# Patient Record
Sex: FEMALE | Race: WHITE
[De-identification: names, ages, dates, MRNs, and addresses within clinical notes are randomized per-mention and may not be internally consistent; named-entity substitution may affect disease eponyms.]

---

## 2020-01-13 ENCOUNTER — HOSPITAL ENCOUNTER (OUTPATIENT)
Dept: HOSPITAL 53 - M RAD | Age: 75
End: 2020-01-13
Attending: PHYSICIAN ASSISTANT
Payer: MEDICARE

## 2020-01-13 DIAGNOSIS — R05: Primary | ICD-10-CM

## 2020-01-13 NOTE — REP
CHEST X-RAY:  TWO VIEWS.

 

HISTORY:  Cough and shortness of breath.  Rule out pneumonia.  No comparison

study.

 

FINDINGS:  There is a large hiatal hernia behind the heart.  Heart is not felt to

be enlarged.  There are clips associated with the hiatal hernia.  There appear to

be some increased markings lateral to the hiatal hernia in the left base

suggestive of an infiltrate.  This is subtle.  The remaining lung fields are

clear.  There is no evidence of pleural effusion.  The aorta is tortuous.

Pulmonary vasculature is not felt to be increased.

 

IMPRESSION:

 

Subtle infiltrate suspected left base.  There is a large hiatal hernia behind the

heart with some associated surgical clips.

 

 

Electronically Signed by

Jeremy Christian MD 01/13/2020 02:51 P

## 2020-01-22 ENCOUNTER — HOSPITAL ENCOUNTER (OUTPATIENT)
Dept: HOSPITAL 53 - M ED | Age: 75
Setting detail: OBSERVATION
LOS: 1 days | Discharge: HOME | End: 2020-01-23
Attending: INTERNAL MEDICINE | Admitting: INTERNAL MEDICINE
Payer: MEDICARE

## 2020-01-22 VITALS — WEIGHT: 212.31 LBS | HEIGHT: 64 IN | BODY MASS INDEX: 36.25 KG/M2

## 2020-01-22 VITALS — SYSTOLIC BLOOD PRESSURE: 144 MMHG | DIASTOLIC BLOOD PRESSURE: 94 MMHG

## 2020-01-22 VITALS — SYSTOLIC BLOOD PRESSURE: 126 MMHG | DIASTOLIC BLOOD PRESSURE: 72 MMHG

## 2020-01-22 DIAGNOSIS — Z88.6: ICD-10-CM

## 2020-01-22 DIAGNOSIS — R09.02: ICD-10-CM

## 2020-01-22 DIAGNOSIS — K44.9: ICD-10-CM

## 2020-01-22 DIAGNOSIS — R07.9: Primary | ICD-10-CM

## 2020-01-22 DIAGNOSIS — R19.7: ICD-10-CM

## 2020-01-22 DIAGNOSIS — R60.0: ICD-10-CM

## 2020-01-22 LAB
ALBUMIN SERPL BCG-MCNC: 3.2 GM/DL (ref 3.2–5.2)
ALT SERPL W P-5'-P-CCNC: < 6 U/L (ref 12–78)
BASOPHILS # BLD AUTO: 0 10^3/UL (ref 0–0.2)
BASOPHILS NFR BLD AUTO: 0.3 % (ref 0–1)
BILIRUB CONJ SERPL-MCNC: 0.3 MG/DL (ref 0–0.2)
BILIRUB SERPL-MCNC: 0.9 MG/DL (ref 0.2–1)
BUN SERPL-MCNC: 17 MG/DL (ref 7–18)
CALCIUM SERPL-MCNC: 8.5 MG/DL (ref 8.8–10.2)
CHLORIDE SERPL-SCNC: 103 MEQ/L (ref 98–107)
CK MB CFR.DF SERPL CALC: 1.82
CK MB SERPL-MCNC: < 1 NG/ML (ref ?–3.6)
CK SERPL-CCNC: 55 U/L (ref 26–192)
CO2 SERPL-SCNC: 27 MEQ/L (ref 21–32)
CREAT SERPL-MCNC: 1.07 MG/DL (ref 0.55–1.3)
EOSINOPHIL # BLD AUTO: 0.1 10^3/UL (ref 0–0.5)
EOSINOPHIL NFR BLD AUTO: 0.8 % (ref 0–3)
GFR SERPL CREATININE-BSD FRML MDRD: 53.4 ML/MIN/{1.73_M2} (ref 39–?)
GLUCOSE SERPL-MCNC: 78 MG/DL (ref 70–100)
HCT VFR BLD AUTO: 42.7 % (ref 36–47)
HGB BLD-MCNC: 13.3 G/DL (ref 12–15.5)
INR PPP: 1.18
LIPASE SERPL-CCNC: 173 U/L (ref 73–393)
LYMPHOCYTES # BLD AUTO: 1.8 10^3/UL (ref 1.5–5)
LYMPHOCYTES NFR BLD AUTO: 15.4 % (ref 24–44)
MCH RBC QN AUTO: 30.8 PG (ref 27–33)
MCHC RBC AUTO-ENTMCNC: 31.1 G/DL (ref 32–36.5)
MCV RBC AUTO: 98.8 FL (ref 80–96)
MONOCYTES # BLD AUTO: 2 10^3/UL (ref 0–0.8)
MONOCYTES NFR BLD AUTO: 16.8 % (ref 0–5)
NEUTROPHILS # BLD AUTO: 7.7 10^3/UL (ref 1.5–8.5)
NEUTROPHILS NFR BLD AUTO: 65.6 % (ref 36–66)
NT-PRO BNP: 353 PG/ML (ref ?–125)
PLATELET # BLD AUTO: 407 10^3/UL (ref 150–450)
POTASSIUM SERPL-SCNC: 4.6 MEQ/L (ref 3.5–5.1)
PROT SERPL-MCNC: 7.2 GM/DL (ref 6.4–8.2)
PROTHROMBIN TIME: 14.7 SECONDS (ref 11.8–14)
RBC # BLD AUTO: 4.32 10^6/UL (ref 4–5.4)
SODIUM SERPL-SCNC: 137 MEQ/L (ref 136–145)
TROPONIN I SERPL-MCNC: < 0.02 NG/ML (ref ?–0.1)
TSH SERPL DL<=0.005 MIU/L-ACNC: 2.12 UIU/ML (ref 0.36–3.74)
WBC # BLD AUTO: 11.8 10^3/UL (ref 4–10)

## 2020-01-22 PROCEDURE — 80048 BASIC METABOLIC PNL TOTAL CA: CPT

## 2020-01-22 PROCEDURE — 85025 COMPLETE CBC W/AUTO DIFF WBC: CPT

## 2020-01-22 PROCEDURE — 96375 TX/PRO/DX INJ NEW DRUG ADDON: CPT

## 2020-01-22 PROCEDURE — 96374 THER/PROPH/DIAG INJ IV PUSH: CPT

## 2020-01-22 PROCEDURE — 36415 COLL VENOUS BLD VENIPUNCTURE: CPT

## 2020-01-22 PROCEDURE — 80076 HEPATIC FUNCTION PANEL: CPT

## 2020-01-22 PROCEDURE — 71260 CT THORAX DX C+: CPT

## 2020-01-22 PROCEDURE — 85610 PROTHROMBIN TIME: CPT

## 2020-01-22 PROCEDURE — 84484 ASSAY OF TROPONIN QUANT: CPT

## 2020-01-22 PROCEDURE — 84443 ASSAY THYROID STIM HORMONE: CPT

## 2020-01-22 PROCEDURE — 85027 COMPLETE CBC AUTOMATED: CPT

## 2020-01-22 PROCEDURE — 94760 N-INVAS EAR/PLS OXIMETRY 1: CPT

## 2020-01-22 PROCEDURE — 93041 RHYTHM ECG TRACING: CPT

## 2020-01-22 PROCEDURE — 99285 EMERGENCY DEPT VISIT HI MDM: CPT

## 2020-01-22 PROCEDURE — 82550 ASSAY OF CK (CPK): CPT

## 2020-01-22 PROCEDURE — 82553 CREATINE MB FRACTION: CPT

## 2020-01-22 PROCEDURE — 93005 ELECTROCARDIOGRAM TRACING: CPT

## 2020-01-22 PROCEDURE — 83690 ASSAY OF LIPASE: CPT

## 2020-01-22 PROCEDURE — 71045 X-RAY EXAM CHEST 1 VIEW: CPT

## 2020-01-22 PROCEDURE — 83880 ASSAY OF NATRIURETIC PEPTIDE: CPT

## 2020-01-22 PROCEDURE — 96372 THER/PROPH/DIAG INJ SC/IM: CPT

## 2020-01-22 RX ADMIN — SODIUM CHLORIDE SCH UNITS: 4.5 INJECTION, SOLUTION INTRAVENOUS at 21:26

## 2020-01-22 RX ADMIN — SODIUM CHLORIDE, PRESERVATIVE FREE SCH ML: 5 INJECTION INTRAVENOUS at 22:09

## 2020-01-22 NOTE — REP
Portable chest, 11:27 a.m., single AP view with the the patient sitting:

Comparison is 01/13/2020.

There is a large hiatal hernia, as previously.

There is focal increased density inferiorly in the left lung, similar to the

prior study, infiltrate versus compression atelectasis from the hiatal hernia.

Right lung is clear.

Cardiac size is normal.

The violeta, mediastinum, skeletal structures are unremarkable.

Impression:

No significant interval change.

Large hiatal hernia.

Increased density inferiorly in the left lung:  Infiltrate versus compression

atelectasis from the hiatal hernia.

 

 

Electronically Signed by

Hair Bunch MD 01/22/2020 11:43 A

## 2020-01-22 NOTE — HPEPDOC
General


Date of Admission


Jan 22, 2020 at 14:12


Date of Service:  Jan 22, 2020


Chief Complaint


The patient is a 75-year-old female admitted with a reason for visit of Chest 

Pain.


Source:  Patient, Family, EMS notes reviewed


Exam Limitations:  No limitations


Timing/Duration:  This morning


Severity:  Moderate


Associated Symptoms:  Chest Pain, Shortness of breath





History of Present Illness


Ms. Babcock is a 75 year old female who presented to the ED with CP and dyspnea. 

Pt reported that this morning she had gotten ready to go out at 10am; while 

sitting and waiting for her ride, she noted 10/10, gripping pain in the middle 

of her chest that radiated to the R side.  The pain lasted for 'several 

minutes', she noticed some shortness of breath and felt very nervous. Pt noted 

some nausea with the pain, but no vomiting.  It waned spontaneously, returned 

less intense several minutes later.  Pt denied previous episodes.  The entire 

episode lasted 30 mins to 1 hour.  Pt denied any pain currently.  


Pt has not seen a doctor regularly in years.  She recently completed Tx for 

pneumonia outpt.  She was sent to the hospital for CXR and Rx Prednisone and 

Azithromycin.  Dyspnea has improved since last week but she and her daughter 

stated that this morning she had gotten very short of breath. 


Pt also reported diarrhea that has been ongoing for 3-4 months.  The stool is 

brown and watery in consistency, sometimes 4-5 times per day, others not at all.





Home Medications


Scheduled PRN


Ibuprofen (Ibu-200) 200 Mg Tablet, 400 MG PO DAILY PRN for BACK PAIN, (Reported)





Allergies


Coded Allergies:  


     aspirin (Verified  Adverse Reaction, Unknown, ulcers, 1/22/20)





Past Medical History


Medical History


Hiatal hernia 


Remote history of Fx L leg (set and cast)


Surgical History


Hiatal hernia repair





Family History


Significant Family History:  Diabetes (Mother ), Lung disease (Father - 

Emphysema )





Social History


* Smoker:  Denies


Alcohol:  Denies


Drugs:  denies


Recent Travel/Sick Contacts:  Denies: Recent travel





A-FIB/CHADSVASC


A-FIB History


Current/History of A-Fib/PAF?:  No


Current PO Anticoag Therapy:  No





Review of Systems


Constitutional:  Denies: Chills, Fever, Night Sweats


Eyes:  Denies: Pain


ENT:  Denies: Head Aches


Skin:  Denies: Rash, Lesions, Breakdown


Pulmonary:  Reports: Dyspnea; 


   Denies: Cough


Cardiovascular:  Reports: Chest Pain; 


   Denies: Palpitations, Orthopnea, Paroxysmal Noc. Dyspnea, Lt Headedness


Gastrointestinal:  Reports: Nausea, Diarrhea; 


   Denies: Vomiting, Abdominal Pain


Genitourinary:  Reports: Incontinence; 


   Denies: Dysuria, Retention


Hematologic:  Denies: Bruising


Musculoskeletal:  Denies: Neck Pain, Back Pain, Joint Pain, Muscle Pain, Spasms


Neurological:  Denies: Confusion


Psych:  Reports: Mood Normal, Memory Issues





Physical Examination


General Exam:  Positive: Alert, Cooperative, No Acute Distress


Eye Exam:  Positive: PERRLA, Conjunctiva & lids normal, EOMI; 


   Negative: Sclera icteric


ENT Exam:  Positive: Atraumatic, Mucous membr. moist/pink, Pharynx Normal


Neck Exam:  Positive: Supple; 


   Negative: JVD, thyromegaly


Chest Exam:  Positive: Clear to auscultation, Normal air movement


Heart Exam:  Positive: Rate Normal, Regular Rhythm, Normal S1, Normal S2, 

Murmurs (2/6 SM S1S2 intact ); 


   Negative: Rubs


Telemetry:  Positive: PVCs (vs PACs)


Abdomen Exam:  Positive: BS Hypoactive, Soft; 


   Negative: Tenderness


Extremity Exam:  Positive: Edema (mild L>R), Normal pulses; 


   Negative: Clubbing, Cyanosis


Skin Exam:  Positive: Nl turgor and temperature; 


   Negative: Breakdown, Lesion


Neuro Exam:  Positive: Normal Speech, Cranial Nerves 3-12 NL


Psych Exam:  Positive: Mental status NL, Mood NL





Vital Signs





Vital Signs








  Date Time  Temp Pulse Resp B/P (MAP) Pulse Ox O2 Delivery O2 Flow Rate FiO2


 


1/22/20 14:15 97.6 76 18 121/60 (80) 95 Room Air  


 


1/22/20 13:07       2.0 











Laboratory Data


Labs 24H


Laboratory Tests 2


1/22/20 11:15: 


Immature Granulocyte % (Auto) 1.1, Neutrophils (%) (Auto) 65.6, Lymphocytes (%) 

(Auto) 15.4L, Monocytes (%) (Auto) 16.8H, Eosinophils (%) (Auto) 0.8, Basophils 

(%) (Auto) 0.3, Neutrophils # (Auto) 7.7, Lymphocytes # (Auto) 1.8, Monocytes # 

(Auto) 2.0H, Eosinophils # (Auto) 0.1, Basophils # (Auto) 0.0, Nucleated Red 

Blood Cells % (auto) 0.0, Prothrombin Time 14.7H, Prothromb Time International 

Ratio 1.18, Anion Gap 7L, Glomerular Filtration Rate 53.4, Calcium Level 8.5L, 

Total Bilirubin 0.9, Direct Bilirubin 0.3H, Aspartate Amino Transf (AST/SGOT) 

11, Alanine Aminotransferase (ALT/SGPT) < 6L, Alkaline Phosphatase 73, Total 

Creatine Kinase 55, Creatine Kinase MB < 1.0, Creatine Kinase MB Relative Index 

1.82, Troponin I < 0.02, NT-Pro-B-Type Natriuretic Peptide 353H, Total Protein 

7.2, Albumin 3.2, Albumin/Globulin Ratio 0.80L, Lipase 173, Thyroid Stimulating 

Hormone (TSH) 2.120


CBC/BMP


Laboratory Tests


1/22/20 11:15











 Assessment/Plan


Ms. Babcock is a 75 year old female who presented to the ED with CP and dyspnea. 

Pt reported that this morning she had gotten ready to go out at 10am; while sit

ting and waiting for her ride, she noted 10/10, gripping pain in the middle of 

her chest that radiated to the R side.  The pain lasted for 'several minutes', 

she noticed some shortness of breath and felt very nervous. Pt noted some nausea

with the pain, but no vomiting.  It waned spontaneously, returned less intense 

several minutes later.  Pt denied previous episodes.  The entire episode lasted 

30 mins to 1 hour.  Pt denied any pain currently.  


Pt has not seen a doctor regularly in years.  She recently completed Tx for 

pneumonia outpt.  She was sent to the hospital for CXR and Rx Prednisone and 

Azithromycin.  Dyspnea has improved since last week but she and her daughter 

stated that this morning she had gotten very short of breath. 


Pt also reported diarrhea that has been ongoing for 3-4 months.  The stool is 

brown and watery in consistency, sometimes 4-5 times per day, others not at all.

   


Ms Babcock's PMHx has been largely benign; until recently, she hasn't seen a 

doctor in 25 years and only takes Ibuprofen very rarely.  She last took one 

several weeks ago and only if she has an ache or pain.  





CP 


- likely GI etiology; rule out cardiac origin 


- Troponin normal; repeat stat for 3hr serial 


- admit to the floor on telemetry 


- review EKG; not available in ED at time of exam. 





CHF with Pleural Effusion 


- suspected in ED


- no evidence of CHF on chest CT


- mild pedal edema l>r


- monitor and rule out 





Hypoxemia 


- O2 via NC


- titrate and maintain sat > 93% 





Recent Hx of Pneumonia 


- mild leukocytosis 


- + dyspnea and hypoxemia noted in the ED 


- pt treated with macrolide to completion 


- CT scan - some b/l subpleural scarring 


- give prophy Ceftriaxone and monitor 


- incentive spirometry 





Gastritis/Hiatal hernia 


- Protonix IV today for some relief 


- start PO tomorrow if can tolerate 


- consider GI consult inpt vs. outpt follow-up based on pt response to therapy





Plan / VTE


VTE Prophylaxis Ordered?:  Yes (Heparin )











ABDULLAHI AYALA PA-C         Jan 22, 2020 16:10

## 2020-01-23 VITALS — SYSTOLIC BLOOD PRESSURE: 142 MMHG | DIASTOLIC BLOOD PRESSURE: 77 MMHG

## 2020-01-23 VITALS — DIASTOLIC BLOOD PRESSURE: 81 MMHG | SYSTOLIC BLOOD PRESSURE: 139 MMHG

## 2020-01-23 LAB
BUN SERPL-MCNC: 16 MG/DL (ref 7–18)
CALCIUM SERPL-MCNC: 8.7 MG/DL (ref 8.8–10.2)
CHLORIDE SERPL-SCNC: 104 MEQ/L (ref 98–107)
CO2 SERPL-SCNC: 29 MEQ/L (ref 21–32)
CREAT SERPL-MCNC: 1.09 MG/DL (ref 0.55–1.3)
GFR SERPL CREATININE-BSD FRML MDRD: 52.2 ML/MIN/{1.73_M2} (ref 39–?)
GLUCOSE SERPL-MCNC: 90 MG/DL (ref 70–100)
HCT VFR BLD AUTO: 41.5 % (ref 36–47)
HGB BLD-MCNC: 12.9 G/DL (ref 12–15.5)
MCH RBC QN AUTO: 30.9 PG (ref 27–33)
MCHC RBC AUTO-ENTMCNC: 31.1 G/DL (ref 32–36.5)
MCV RBC AUTO: 99.5 FL (ref 80–96)
PLATELET # BLD AUTO: 393 10^3/UL (ref 150–450)
POTASSIUM SERPL-SCNC: 4.4 MEQ/L (ref 3.5–5.1)
RBC # BLD AUTO: 4.17 10^6/UL (ref 4–5.4)
SODIUM SERPL-SCNC: 138 MEQ/L (ref 136–145)
WBC # BLD AUTO: 9.5 10^3/UL (ref 4–10)

## 2020-01-23 RX ADMIN — SODIUM CHLORIDE, PRESERVATIVE FREE SCH ML: 5 INJECTION INTRAVENOUS at 04:56

## 2020-01-23 RX ADMIN — SODIUM CHLORIDE SCH UNITS: 4.5 INJECTION, SOLUTION INTRAVENOUS at 09:05

## 2020-02-11 ENCOUNTER — HOSPITAL ENCOUNTER (OUTPATIENT)
Dept: HOSPITAL 53 - M LAB | Age: 75
End: 2020-02-11
Attending: NURSE PRACTITIONER
Payer: MEDICARE

## 2020-02-11 DIAGNOSIS — E78.00: ICD-10-CM

## 2020-02-11 DIAGNOSIS — R73.01: ICD-10-CM

## 2020-02-11 DIAGNOSIS — Z00.00: Primary | ICD-10-CM

## 2020-02-11 LAB
CHOLEST SERPL-MCNC: 143 MG/DL (ref ?–200)
CHOLEST/HDLC SERPL: 3.04 {RATIO} (ref ?–5)
EST. AVERAGE GLUCOSE BLD GHB EST-MCNC: 134 MG/DL (ref 60–110)
HDLC SERPL-MCNC: 47 MG/DL (ref 40–?)
LDLC SERPL CALC-MCNC: 73 MG/DL (ref ?–100)
NONHDLC SERPL-MCNC: 96 MG/DL
TRIGL SERPL-MCNC: 116 MG/DL (ref ?–150)

## 2020-08-19 ENCOUNTER — HOSPITAL ENCOUNTER (OUTPATIENT)
Dept: HOSPITAL 53 - M RAD | Age: 75
End: 2020-08-19
Attending: NURSE PRACTITIONER
Payer: MEDICARE

## 2020-08-19 DIAGNOSIS — M79.671: Primary | ICD-10-CM

## 2020-08-19 DIAGNOSIS — M77.31: ICD-10-CM

## 2020-08-19 DIAGNOSIS — M19.071: ICD-10-CM

## 2020-08-28 NOTE — REP
RIGHT FOOT SERIES: 4-VIEWS



HISTORY: Pain in the right foot. 



FINDINGS: 

Four views of the right foot show overall normal mineralization. No fracture or 
subluxation is seen. There is Achilles and plantar calcaneal spurring. Mild 
midfoot osteoarthritic spurring is seen. No erosive changes are noted.



IMPRESSION: 

No acute abnormality. Osteoarthritic and heal spurring noted. 

MTDD

## 2021-01-08 ENCOUNTER — HOSPITAL ENCOUNTER (INPATIENT)
Dept: HOSPITAL 53 - M ED | Age: 76
LOS: 3 days | Discharge: HOME | DRG: 177 | End: 2021-01-11
Attending: INTERNAL MEDICINE | Admitting: INTERNAL MEDICINE
Payer: MEDICARE

## 2021-01-08 VITALS — WEIGHT: 215.39 LBS | BODY MASS INDEX: 39.64 KG/M2 | HEIGHT: 62 IN

## 2021-01-08 DIAGNOSIS — Z66: ICD-10-CM

## 2021-01-08 DIAGNOSIS — J12.89: ICD-10-CM

## 2021-01-08 DIAGNOSIS — U07.1: Primary | ICD-10-CM

## 2021-01-08 DIAGNOSIS — Z88.6: ICD-10-CM

## 2021-01-08 DIAGNOSIS — Z79.899: ICD-10-CM

## 2021-01-08 DIAGNOSIS — N17.9: ICD-10-CM

## 2021-01-08 DIAGNOSIS — J96.01: ICD-10-CM

## 2021-01-08 DIAGNOSIS — I10: ICD-10-CM

## 2021-01-08 LAB
ALBUMIN SERPL BCG-MCNC: 3 GM/DL (ref 3.2–5.2)
ALT SERPL W P-5'-P-CCNC: 13 U/L (ref 12–78)
APTT BLD: 33.4 SECONDS (ref 24.2–38.5)
BASE EXCESS BLDV CALC-SCNC: -1.4 MMOL/L (ref -2–2)
BASOPHILS # BLD AUTO: 0 10^3/UL (ref 0–0.2)
BASOPHILS NFR BLD AUTO: 0.6 % (ref 0–1)
BILIRUB SERPL-MCNC: 0.5 MG/DL (ref 0.2–1)
BUN SERPL-MCNC: 18 MG/DL (ref 7–18)
CALCIUM SERPL-MCNC: 7.7 MG/DL (ref 8.8–10.2)
CHLORIDE SERPL-SCNC: 103 MEQ/L (ref 98–107)
CK MB CFR.DF SERPL CALC: 1.16
CK MB SERPL-MCNC: < 1 NG/ML (ref ?–3.6)
CK SERPL-CCNC: 86 U/L (ref 26–192)
CO2 BLDV CALC-SCNC: 26.1 MEQ/L (ref 24–28)
CO2 SERPL-SCNC: 27 MEQ/L (ref 21–32)
CREAT SERPL-MCNC: 1.56 MG/DL (ref 0.55–1.3)
CRP SERPL-MCNC: 2.51 MG/DL (ref 0–0.3)
D DIMER PPP DDU-MCNC: 1551.7 NG/ML (ref ?–500)
EOSINOPHIL # BLD AUTO: 0 10^3/UL (ref 0–0.5)
EOSINOPHIL NFR BLD AUTO: 0.4 % (ref 0–3)
FERRITIN SERPL-MCNC: 345 NG/ML (ref 8–252)
FIBRINOGEN PPP-MCNC: 565 MG/DL (ref 221–452)
GFR SERPL CREATININE-BSD FRML MDRD: 34.5 ML/MIN/{1.73_M2} (ref 39–?)
GLUCOSE SERPL-MCNC: 88 MG/DL (ref 70–100)
HCO3 BLDV-SCNC: 24.7 MEQ/L (ref 23–27)
HCO3 STD BLDV-SCNC: 23.3 MEQ/L
HCT VFR BLD AUTO: 41.3 % (ref 36–47)
HGB BLD-MCNC: 13.1 G/DL (ref 12–15.5)
INR PPP: 0.98
LDH SERPL L TO P-CCNC: 264 U/L (ref 84–246)
LYMPHOCYTES # BLD AUTO: 1.2 10^3/UL (ref 1.5–5)
LYMPHOCYTES NFR BLD AUTO: 24.4 % (ref 24–44)
MAGNESIUM SERPL-MCNC: 1.7 MG/DL (ref 1.8–2.4)
MCH RBC QN AUTO: 30.9 PG (ref 27–33)
MCHC RBC AUTO-ENTMCNC: 31.7 G/DL (ref 32–36.5)
MCV RBC AUTO: 97.4 FL (ref 80–96)
MONOCYTES # BLD AUTO: 0.7 10^3/UL (ref 0–0.8)
MONOCYTES NFR BLD AUTO: 14.5 % (ref 0–5)
NEUTROPHILS # BLD AUTO: 2.9 10^3/UL (ref 1.5–8.5)
NEUTROPHILS NFR BLD AUTO: 58.9 % (ref 36–66)
PCO2 BLDV: 46.5 MMHG (ref 38–50)
PH BLDV: 7.34 UNITS (ref 7.33–7.43)
PLATELET # BLD AUTO: 229 10^3/UL (ref 150–450)
PO2 BLDV: 86.5 MMHG (ref 30–50)
POTASSIUM SERPL-SCNC: 3.8 MEQ/L (ref 3.5–5.1)
PROT SERPL-MCNC: 6.7 GM/DL (ref 6.4–8.2)
PROTHROMBIN TIME: 13.2 SECONDS (ref 12.5–14.3)
RBC # BLD AUTO: 4.24 10^6/UL (ref 4–5.4)
SAO2 % BLDV: 96.7 % (ref 60–80)
SODIUM SERPL-SCNC: 136 MEQ/L (ref 136–145)
TROPONIN I SERPL-MCNC: < 0.02 NG/ML (ref ?–0.1)
WBC # BLD AUTO: 5 10^3/UL (ref 4–10)

## 2021-01-08 PROCEDURE — 3E0333Z INTRODUCTION OF ANTI-INFLAMMATORY INTO PERIPHERAL VEIN, PERCUTANEOUS APPROACH: ICD-10-PCS | Performed by: INTERNAL MEDICINE

## 2021-01-08 PROCEDURE — XW033E5 INTRODUCTION OF REMDESIVIR ANTI-INFECTIVE INTO PERIPHERAL VEIN, PERCUTANEOUS APPROACH, NEW TECHNOLOGY GROUP 5: ICD-10-PCS | Performed by: INTERNAL MEDICINE

## 2021-01-08 RX ADMIN — SODIUM CHLORIDE SCH MLS/HR: 9 INJECTION, SOLUTION INTRAVENOUS at 18:15

## 2021-01-08 RX ADMIN — DEXAMETHASONE SODIUM PHOSPHATE SCH MG: 4 INJECTION, SOLUTION INTRAMUSCULAR; INTRAVENOUS at 18:00

## 2021-01-08 NOTE — ECGEPIP
[facility] - ED

                                       

                                       Test Date:    2021

Pat Name:     PETAR SIMPSON             Department:   

Patient ID:   [id number]                 Room:         -

Gender:       Female                   Technician:   CHRISTINE

:          1945               Requested By: Kristen Luke 

Order Number: [id number]     Reading MD:   Chad Veloz

                                 Measurements

Intervals                              Axis          

Rate:         81                       P:            8

OH:           203                      QRS:          45

QRSD:         93                       T:            -3

QT:           402                                    

QTc:          469                                    

                           Interpretive Statements

SINUS RHYTHM WITH FREQUENT VENTRICULAR PREMATURE COMPLEXES

LOW QRS VOLTAGE IN PRECORDIAL LEADS

POOR R WAVE PROGRESSION

SIMILAR TO 20

Electronically Signed on 2021 21:45:06 EST by Chad Veloz

## 2021-01-08 NOTE — REP
INDICATION:

?PE



COMPARISON:

None.



TECHNIQUE:

Axial contrast enhanced images from the thoracic inlet to the upper abdomen using

pulmonary embolus technique with multiplanar re-formations.  75 ml Isovue 370

intravenous contrast material administered without complication.



This CT examination was performed using the following dose reduction techniques:

Automated exposure control, adjustment of mA and/or kv according to the patient's

size, and use of iterative reconstruction technique.





FINDINGS:

Marked respiratory motion significantly limits evaluation.  However, no obvious main

to 2nd order pulmonary artery emboli are identified.  Cardiomegaly with pulmonary

vascular congestion and diffuse chronic interstitial changes with areas of subpleural

fibrosis are again noted.  Subtle moderate areas of early bilateral airspace disease

are suggested but poorly evaluated due to motion.  No effusion.  No pneumothorax.

Large hiatal hernia identified.  Nonspecific lymph nodes in the mediastinum possibly

reactive without significant adenopathy.



IMPRESSION:

1.  Significantly limited examination due to marked respiratory motion artifact.  No

obvious pulmonary embolus is identified.

2.  Chronic bilateral parenchymal disease with suspected moderate areas of early

peripheral opacities suggested.  May be related to multifocal pneumonia or early

COVID-19 pulmonary disease.





<Electronically signed by Kristopher Cook > 01/08/21 [id number]

## 2021-01-08 NOTE — REP
INDICATION:

Coronavirus workup



COMPARISON:

01/22/2020



TECHNIQUE:

Portable AP view of the chest



FINDINGS:

Cardiomegaly and large hiatal hernia along with diffuse chronic interstitial changes

are similar to prior examination.  Subtle superimposed airspace disease cannot be

excluded.  No pneumothorax.  No definite effusion.  Skeletal structures intact.



IMPRESSION:

Chronic stable changes.  Cannot exclude subtle superimposed airspace disease.





<Electronically signed by Kristopher Cook > 01/08/21 [id number]

## 2021-01-08 NOTE — HPEPDOC
General


Date of Admission


1/8/21


Date of Service:  Jan 8, 2021


Chief Complaint


The patient is a 75-year-old female admitted with a reason for visit of Diff 

Breathing.


Source:  Patient


Exam Limitations:  No limitations


Timing/Duration:  Day(s)


Severity:  Moderate


Associated Symptoms:  Shortness of breath





History of Present Illness


Patient is 75 years old female with past history of hypertension presented to 

the hospital with increased shortness of breath and cough. According to patient 

her daughter pulses positive for Covid 19 a few days ago. Patient stated that 

she was started cough 2 days ago. In ER patient was found to have tachypnea, 

acute hypoxemic respiratory failure, positive Covid test . CTA was done and 

showed No obvious pulmonary embolus is identified. Chronic bilateral parenchymal

disease with suspected moderate areas of early peripheral opacities suggested.  

May be related to multifocal pneumonia or early COVID-19 pulmonary disease.





Home Medications


Scheduled


Metoprolol Succinate (Metoprolol Succinate) 25 Mg Tab.er.24h, 25 MG PO DAILY, 

(Reported)


Pantoprazole Sodium (Pantoprazole Sodium) 40 Mg Tablet.dr, 40 MG PO DAILY, 

(Reported)


Triamterene/Hydrochlorothiazid (Triamterene-Hctz 37.5-25 mg Cp) 1 Each Capsule, 

1 CAP PO DAILY, (Reported)





Allergies


Coded Allergies:  


     aspirin (Verified  Adverse Reaction, Unknown, ulcers, 1/22/20)





Past Medical History


Medical History


Hypertension


Surgical History


Hiatal hernia repair





Family History


Significant Family History:  Diabetes (Mother ), Lung disease (Father - 

Emphysema )





Social History


* Smoker:  Denies


Alcohol:  Denies


Drugs:  denies





A-FIB/CHADSVASC


A-FIB History


Current/History of A-Fib/PAF?:  No


Current PO Anticoag Therapy:  No





Review of Systems


Constitutional:  Reports: Weakness; 


   Denies: Chills, Fever


Eyes:  Denies: Pain


ENT:  Denies: Head Aches


Skin:  Denies: Rash, Lesions


Pulmonary:  Reports: Dyspnea, Cough


Cardiovascular:  Denies: Chest Pain, Palpitations


Gastrointestinal:  Denies: Nausea, Vomiting


Genitourinary:  Denies: Dysuria, Frequency


Hematologic:  Denies: Bruising


Endocrine:  Denies: Polydipsia


Musculoskeletal:  Denies: Neck Pain


Neurological:  Denies: Weakness


Psych:  Reports: Mood Normal





Physical Examination


General Exam:  Positive: Alert, Cooperative


Eye Exam:  Positive: PERRLA


ENT Exam:  Positive: Atraumatic


Neck Exam:  Positive: Supple; 


   Negative: JVD


Chest Exam:  Positive: Rhonchi


Heart Exam:  Positive: Rate Normal


Telemetry:  Positive: No significant arrhythmia


Abdomen Exam:  Positive: Normal bowel sounds


Extremity Exam:  Negative: Clubbing


Skin Exam:  Positive: Nl turgor and temperature


Neuro Exam:  Positive: Strength at 5/5 X4 ext


Psych Exam:  Positive: Mental status NL





Vital Signs





Vital Signs








  Date Time  Temp Pulse Resp B/P (MAP) Pulse Ox O2 Delivery O2 Flow Rate FiO2


 


1/8/21 14:30  76   96 Nasal Cannula 2.0 


 


1/8/21 14:25   22     


 


1/8/21 13:51        


 


1/8/21 12:37 98.2       











Laboratory Data


Labs 24H


Laboratory Tests 2


1/8/21 12:59: 


Prothrombin Time 13.2, Prothromb Time International Ratio 0.98, Activated 

Partial Thromboplast Time 33.4, Fibrinogen 565H, D-Dimer, Quantitative 1551.70H


1/8/21 13:00: 


Immature Granulocyte % (Auto) 1.2, Neutrophils (%) (Auto) 58.9, Lymphocytes (%) 

(Auto) 24.4, Monocytes (%) (Auto) 14.5H, Eosinophils (%) (Auto) 0.4, Basophils 

(%) (Auto) 0.6, Neutrophils # (Auto) 2.9, Lymphocytes # (Auto) 1.2L, Monocytes #

(Auto) 0.7, Eosinophils # (Auto) 0.0, Basophils # (Auto) 0.0, Nucleated Red 

Blood Cells % (auto) 0.0, Blood Gas Bicarbonate Standard 23.3, Venous Blood pH 

7.343, Venous Blood Partial Pressure CO2 46.5, Venous Blood Partial Pressure O2 

86.5H, Venous Blood Total Carbon Dioxide 26.1, Venous Blood HCO3 24.7, Venous 

Blood Oxygen Saturation 96.7H, Venous Blood Base Excess -1.4, Anion Gap 6L, 

Glomerular Filtration Rate 34.5L, Lactic Acid Level 1.0, Calcium Level 7.7L, 

Magnesium Level 1.7L, Ferritin 345H, Total Bilirubin 0.5, Aspartate Amino Transf

(AST/SGOT) 37, Alanine Aminotransferase (ALT/SGPT) 13, Alkaline Phosphatase 71, 

Lactate Dehydrogenase 264H, Total Creatine Kinase 86, Creatine Kinase MB < 1.0, 

Creatine Kinase MB Relative Index 1.16, Troponin I < 0.02, C-Reactive Protein, 

Quantitative 2.51H, Total Protein 6.7, Albumin 3.0L, Albumin/Globulin Ratio 

0.8L, Procalcitonin 0.05


CBC/BMP


Laboratory Tests


1/8/21 13:00








Microbiology





Microbiology


1/8/21 Respiratory Virus Panel (PCR) ([facility]) - Final, Complete


         SARS-CoV-2 (COVID 19)


1/8/21 Blood Culture, Received


         Pending


1/8/21 Blood Culture, Received


         Pending





 Assessment/Plan


Patient is 75 years old female with past history of hypertension presented to 

the hospital with increased shortness of breath and cough. According to patient 

her daughter pulses positive for Covid 19 a few days ago. Patient stated that 

she was started cough 2 days ago. In ER patient was found to have tachypnea, 

acute hypoxemic respiratory failure, positive Covid test . CTA was done and 

showed No obvious pulmonary embolus is identified. Chronic bilateral parenchymal

disease with suspected moderate areas of early peripheral opacities suggested.  

May be related to multifocal pneumonia or early COVID-19 pulmonary disease.





Problems





(1) Pneumonia


Status:  Acute


Problem Text:  


Community-acquired pneumonia secondary to COVID 19


Labs according to Covid protocol


Remdesevir  IV


Dexamethasone IV


Levofloxacin IV





(2) COVID-19


Status:  Acute


Problem Text:  See above





(3) Respiratory failure with hypoxia


Status:  Chronic


Problem Text:  During walking test oxygen saturation dropped to 84%


Inhalers





(4) HOWARD (acute kidney injury)


Status:  Acute


Problem Text:  Secondary to dehydration


IV fluid


Continue to monitor





(5) Hypertension


Status:  Chronic


Problem Text:  Blood pressures under control


Continue home cardioprotective medication








Plan / VTE


VTE Prophylaxis Ordered?:  Yes











PAULINE YEAGER DO             Jan 8, 2021 18:18

## 2021-01-09 VITALS — OXYGEN SATURATION: 94 %

## 2021-01-09 VITALS — DIASTOLIC BLOOD PRESSURE: 80 MMHG | SYSTOLIC BLOOD PRESSURE: 118 MMHG | OXYGEN SATURATION: 91 %

## 2021-01-09 VITALS — DIASTOLIC BLOOD PRESSURE: 77 MMHG | SYSTOLIC BLOOD PRESSURE: 135 MMHG

## 2021-01-09 VITALS — SYSTOLIC BLOOD PRESSURE: 114 MMHG | DIASTOLIC BLOOD PRESSURE: 66 MMHG

## 2021-01-09 VITALS — OXYGEN SATURATION: 90 %

## 2021-01-09 VITALS — DIASTOLIC BLOOD PRESSURE: 68 MMHG | SYSTOLIC BLOOD PRESSURE: 145 MMHG | OXYGEN SATURATION: 90 %

## 2021-01-09 LAB
ALBUMIN SERPL BCG-MCNC: 2.7 GM/DL (ref 3.2–5.2)
ALT SERPL W P-5'-P-CCNC: 12 U/L (ref 12–78)
APTT BLD: 36.4 SECONDS (ref 24.2–38.5)
BASOPHILS # BLD AUTO: 0 10^3/UL (ref 0–0.2)
BASOPHILS NFR BLD AUTO: 0.5 % (ref 0–1)
BILIRUB CONJ SERPL-MCNC: 0.1 MG/DL (ref 0–0.2)
BILIRUB SERPL-MCNC: 0.3 MG/DL (ref 0.2–1)
BUN SERPL-MCNC: 17 MG/DL (ref 7–18)
CALCIUM SERPL-MCNC: 7.8 MG/DL (ref 8.8–10.2)
CHLORIDE SERPL-SCNC: 107 MEQ/L (ref 98–107)
CO2 SERPL-SCNC: 26 MEQ/L (ref 21–32)
CREAT SERPL-MCNC: 1.37 MG/DL (ref 0.55–1.3)
CRP SERPL-MCNC: 3.33 MG/DL (ref 0–0.3)
EOSINOPHIL # BLD AUTO: 0 10^3/UL (ref 0–0.5)
EOSINOPHIL NFR BLD AUTO: 0 % (ref 0–3)
FERRITIN SERPL-MCNC: 365 NG/ML (ref 8–252)
FIBRINOGEN PPP-MCNC: 546 MG/DL (ref 221–452)
GFR SERPL CREATININE-BSD FRML MDRD: 40.1 ML/MIN/{1.73_M2} (ref 39–?)
GLUCOSE SERPL-MCNC: 157 MG/DL (ref 70–100)
HCT VFR BLD AUTO: 40.8 % (ref 36–47)
HGB BLD-MCNC: 12.9 G/DL (ref 12–15.5)
INR PPP: 1.03
LYMPHOCYTES # BLD AUTO: 0.6 10^3/UL (ref 1.5–5)
LYMPHOCYTES NFR BLD AUTO: 16.9 % (ref 24–44)
MAGNESIUM SERPL-MCNC: 1.6 MG/DL (ref 1.8–2.4)
MCH RBC QN AUTO: 31.5 PG (ref 27–33)
MCHC RBC AUTO-ENTMCNC: 31.6 G/DL (ref 32–36.5)
MCV RBC AUTO: 99.5 FL (ref 80–96)
MONOCYTES # BLD AUTO: 0.3 10^3/UL (ref 0–0.8)
MONOCYTES NFR BLD AUTO: 7.9 % (ref 0–5)
NEUTROPHILS # BLD AUTO: 2.7 10^3/UL (ref 1.5–8.5)
NEUTROPHILS NFR BLD AUTO: 73.1 % (ref 36–66)
NT-PRO BNP: 689 PG/ML (ref ?–125)
PLATELET # BLD AUTO: 233 10^3/UL (ref 150–450)
POTASSIUM SERPL-SCNC: 4.3 MEQ/L (ref 3.5–5.1)
PROT SERPL-MCNC: 6.4 GM/DL (ref 6.4–8.2)
PROTHROMBIN TIME: 13.7 SECONDS (ref 12.5–14.3)
RBC # BLD AUTO: 4.1 10^6/UL (ref 4–5.4)
SODIUM SERPL-SCNC: 139 MEQ/L (ref 136–145)
TROPONIN I SERPL-MCNC: < 0.02 NG/ML (ref ?–0.1)
TROPONIN I SERPL-MCNC: < 0.02 NG/ML (ref ?–0.1)
WBC # BLD AUTO: 3.7 10^3/UL (ref 4–10)

## 2021-01-09 RX ADMIN — METOPROLOL SUCCINATE SCH MG: 25 TABLET, EXTENDED RELEASE ORAL at 09:09

## 2021-01-09 RX ADMIN — TRIAMTERENE AND HYDROCHLOROTHIAZIDE SCH EA: 25; 37.5 CAPSULE ORAL at 09:08

## 2021-01-09 RX ADMIN — SODIUM CHLORIDE SCH MLS/HR: 9 INJECTION, SOLUTION INTRAVENOUS at 20:32

## 2021-01-09 RX ADMIN — SODIUM CHLORIDE SCH MLS/HR: 9 INJECTION, SOLUTION INTRAVENOUS at 09:10

## 2021-01-09 RX ADMIN — SODIUM CHLORIDE SCH ML: 9 INJECTION, SOLUTION INTRAMUSCULAR; INTRAVENOUS; SUBCUTANEOUS at 21:55

## 2021-01-09 RX ADMIN — ENOXAPARIN SODIUM SCH MG: 40 INJECTION SUBCUTANEOUS at 09:10

## 2021-01-09 RX ADMIN — PANTOPRAZOLE SODIUM SCH MG: 40 TABLET, DELAYED RELEASE ORAL at 09:09

## 2021-01-09 RX ADMIN — DEXAMETHASONE SODIUM PHOSPHATE SCH MG: 4 INJECTION, SOLUTION INTRAMUSCULAR; INTRAVENOUS at 09:09

## 2021-01-09 NOTE — IPNPDOC
Text Note


Date of Service


The patient was seen on 1/9/21.





NOTE


Subjective: No any acute events overnight. Patient continues to have high oxygen

requirements via nasal  cannula 4L





Objective:


GENERAL APPEARANCE: NAD


HEENT: no scleral icterus, no JVD, EOMI


CARDIOVASCULAR: S1S2


LUNGS: Diminished lung sounds bilaterally, mild rhonchi at the bases


ABDOMEN: soft & not tender w palpitation


MUSCULOSKELETAL: no cyanosis, no swelling


INTEGUMENT: no generalized pallor


NEUROLOGICAL: cranial nerve function from 2-12 intact intact, follows commands, 

speech not dysarthric








Assessment/Plan


Patient is 75 years old female with past history of hypertension presented to 

the hospital with increased shortness of breath and cough. According to patient 

her daughter pulses positive for Covid 19 a few days ago. Patient stated that 

she was started cough 2 days ago. In ER patient was found to have tachypnea, 

acute hypoxemic respiratory failure, positive Covid test . CTA was done and 

showed No obvious pulmonary embolus is identified. Chronic bilateral parenchymal

disease with suspected moderate areas of early peripheral opacities suggested.  

May be related to multifocal pneumonia or early COVID-19 pulmonary disease.





Problems





(1) Pneumonia 


Community-acquired pneumonia secondary to COVID 19


Labs according to Covid protocol


Inflammatory marker d-dimer mildly increased to 1685


Procalcitonin negative


Remdesevir  IV


Dexamethasone IV


Dc Levofloxacin IV





(2) COVID-19


  See above





(3) Respiratory failure with hypoxia


 During walking test oxygen saturation dropped to 84%


Inhalers on admission


Today BNP elevated to 680 most likely secondary to volume overload. I DC fluid. 

Lasix IV 20 mg 





(4) HOWARD (acute kidney injury)


Improved


Secondary to dehydration


Continue to monitor





(5) Hypertension


Blood pressures under control


Continue home cardioprotective medication





VS,Fishbone, I+O


VS, Fishbone, I+O


Laboratory Tests


1/9/21 10:05











Vital Signs








  Date Time  Temp Pulse Resp B/P (MAP) Pulse Ox O2 Delivery O2 Flow Rate FiO2


 


1/9/21 16:00     94 Nasal Cannula 4.0 


 


1/9/21 12:00 96.8 64 20 118/80 (93)    














I&O- Last 24 Hours up to 6 AM 


 


 1/9/21





 06:00


 


Intake Total 440 ml


 


Balance 440 ml

















PAULINE YEAGER DO             Jan 9, 2021 19:43

## 2021-01-10 VITALS — OXYGEN SATURATION: 89 % | SYSTOLIC BLOOD PRESSURE: 118 MMHG | DIASTOLIC BLOOD PRESSURE: 64 MMHG

## 2021-01-10 VITALS — SYSTOLIC BLOOD PRESSURE: 137 MMHG | DIASTOLIC BLOOD PRESSURE: 68 MMHG

## 2021-01-10 VITALS — OXYGEN SATURATION: 91 %

## 2021-01-10 VITALS — OXYGEN SATURATION: 88 %

## 2021-01-10 VITALS — OXYGEN SATURATION: 90 %

## 2021-01-10 VITALS — DIASTOLIC BLOOD PRESSURE: 80 MMHG | SYSTOLIC BLOOD PRESSURE: 116 MMHG

## 2021-01-10 VITALS — SYSTOLIC BLOOD PRESSURE: 122 MMHG | DIASTOLIC BLOOD PRESSURE: 56 MMHG

## 2021-01-10 LAB
ALBUMIN SERPL BCG-MCNC: 2.8 GM/DL (ref 3.2–5.2)
ALT SERPL W P-5'-P-CCNC: 13 U/L (ref 12–78)
APTT BLD: 34.9 SECONDS (ref 24.2–38.5)
BASOPHILS # BLD AUTO: 0 10^3/UL (ref 0–0.2)
BASOPHILS NFR BLD AUTO: 0.5 % (ref 0–1)
BILIRUB CONJ SERPL-MCNC: 0.1 MG/DL (ref 0–0.2)
BILIRUB SERPL-MCNC: 0.3 MG/DL (ref 0.2–1)
BUN SERPL-MCNC: 24 MG/DL (ref 7–18)
CALCIUM SERPL-MCNC: 8.8 MG/DL (ref 8.8–10.2)
CHLORIDE SERPL-SCNC: 104 MEQ/L (ref 98–107)
CO2 SERPL-SCNC: 29 MEQ/L (ref 21–32)
CREAT SERPL-MCNC: 1.49 MG/DL (ref 0.55–1.3)
EOSINOPHIL # BLD AUTO: 0 10^3/UL (ref 0–0.5)
EOSINOPHIL NFR BLD AUTO: 0 % (ref 0–3)
FERRITIN SERPL-MCNC: 468 NG/ML (ref 8–252)
FIBRINOGEN PPP-MCNC: 567 MG/DL (ref 221–452)
GFR SERPL CREATININE-BSD FRML MDRD: 36.4 ML/MIN/{1.73_M2} (ref 39–?)
GLUCOSE SERPL-MCNC: 105 MG/DL (ref 70–100)
HCT VFR BLD AUTO: 43 % (ref 36–47)
HGB BLD-MCNC: 13.5 G/DL (ref 12–15.5)
INR PPP: 1.13
LYMPHOCYTES # BLD AUTO: 0.8 10^3/UL (ref 1.5–5)
LYMPHOCYTES NFR BLD AUTO: 14.4 % (ref 24–44)
MAGNESIUM SERPL-MCNC: 2 MG/DL (ref 1.8–2.4)
MCH RBC QN AUTO: 31.3 PG (ref 27–33)
MCHC RBC AUTO-ENTMCNC: 31.4 G/DL (ref 32–36.5)
MCV RBC AUTO: 99.5 FL (ref 80–96)
MONOCYTES # BLD AUTO: 0.8 10^3/UL (ref 0–0.8)
MONOCYTES NFR BLD AUTO: 13.6 % (ref 0–5)
NEUTROPHILS # BLD AUTO: 4.1 10^3/UL (ref 1.5–8.5)
NEUTROPHILS NFR BLD AUTO: 69.6 % (ref 36–66)
NT-PRO BNP: 825 PG/ML (ref ?–125)
PLATELET # BLD AUTO: 284 10^3/UL (ref 150–450)
POTASSIUM SERPL-SCNC: 4.4 MEQ/L (ref 3.5–5.1)
PROT SERPL-MCNC: 7 GM/DL (ref 6.4–8.2)
PROTHROMBIN TIME: 14.8 SECONDS (ref 12.5–14.3)
RBC # BLD AUTO: 4.32 10^6/UL (ref 4–5.4)
SODIUM SERPL-SCNC: 139 MEQ/L (ref 136–145)
TROPONIN I SERPL-MCNC: < 0.02 NG/ML (ref ?–0.1)
WBC # BLD AUTO: 5.8 10^3/UL (ref 4–10)

## 2021-01-10 RX ADMIN — SODIUM CHLORIDE SCH MLS/HR: 9 INJECTION, SOLUTION INTRAVENOUS at 21:37

## 2021-01-10 RX ADMIN — SODIUM CHLORIDE SCH ML: 9 INJECTION, SOLUTION INTRAMUSCULAR; INTRAVENOUS; SUBCUTANEOUS at 21:37

## 2021-01-10 RX ADMIN — ENOXAPARIN SODIUM SCH MG: 40 INJECTION SUBCUTANEOUS at 10:00

## 2021-01-10 RX ADMIN — FUROSEMIDE SCH MG: 10 INJECTION, SOLUTION INTRAMUSCULAR; INTRAVENOUS at 10:04

## 2021-01-10 RX ADMIN — PANTOPRAZOLE SODIUM SCH MG: 40 TABLET, DELAYED RELEASE ORAL at 09:59

## 2021-01-10 RX ADMIN — DEXAMETHASONE SODIUM PHOSPHATE SCH MG: 4 INJECTION, SOLUTION INTRAMUSCULAR; INTRAVENOUS at 10:00

## 2021-01-10 RX ADMIN — METOPROLOL SUCCINATE SCH MG: 25 TABLET, EXTENDED RELEASE ORAL at 10:01

## 2021-01-10 RX ADMIN — TRIAMTERENE AND HYDROCHLOROTHIAZIDE SCH EA: 25; 37.5 CAPSULE ORAL at 09:59

## 2021-01-10 RX ADMIN — Medication SCH MG: at 10:01

## 2021-01-10 NOTE — IPNPDOC
Text Note


Date of Service


The patient was seen on 1/10/21.





NOTE


Subjective: No any acute events overnight. Patient stated that she feels little 

bit better today





Objective:


GENERAL APPEARANCE: NAD


HEENT: no scleral icterus, no JVD, EOMI


CARDIOVASCULAR: S1S2


LUNGS: Diminished lung sounds bilaterally, mild rhonchi at the bases


ABDOMEN: soft & not tender w palpitation


MUSCULOSKELETAL: no cyanosis, no swelling


INTEGUMENT: no generalized pallor


NEUROLOGICAL: cranial nerve function from 2-12 intact intact, follows commands, 

speech not dysarthric








Assessment/Plan


Patient is 75 years old female with past history of hypertension presented to 

the hospital with increased shortness of breath and cough. According to patient 

her daughter pulses positive for Covid 19 a few days ago. Patient stated that 

she was started cough 2 days ago. In ER patient was found to have tachypnea, 

acute hypoxemic respiratory failure, positive Covid test . CTA was done and 

showed No obvious pulmonary embolus is identified. Chronic bilateral parenchymal

disease with suspected moderate areas of early peripheral opacities suggested.  

May be related to multifocal pneumonia or early COVID-19 pulmonary disease.





Problems





(1) Pneumonia 


Community-acquired pneumonia secondary to COVID 19


Labs according to Covid protocol


Inflammatory marker d-dimer mildly increased to 1685


Procalcitonin negative


Remdesevir  IV


Dexamethasone IV


Dc Levofloxacin IV





(2) COVID-19


  See above





(3) Respiratory failure with hypoxia


Secondary to pneumonia


During walking test oxygen saturation dropped to 84% on admission


Inhalers 








(4) HOWARD (acute kidney injury)


Worsening today


Lasix on hold


Triamterene/Hydrochlorothiazide on hold





(5) Hypertension


Blood pressures under control


Blood pressures under control


Amlodipine 10 mg added





VS,Fishbone, I+O


VS, Fishbone, I+O


Laboratory Tests


1/10/21 05:48











Vital Signs








  Date Time  Temp Pulse Resp B/P (MAP) Pulse Ox O2 Delivery O2 Flow Rate FiO2


 


1/10/21 16:00       4.0 


 


1/10/21 12:00     91 Nasal Cannula  


 


1/10/21 10:01  65  118/64    


 


1/10/21 08:00 96.3  20     














I&O- Last 24 Hours up to 6 AM 


 


 1/10/21





 06:00


 


Intake Total 1920 ml


 


Output Total 2550 ml


 


Balance -630 ml

















PAULINE YEAEGR DO            Missael 10, 2021 17:22

## 2021-01-11 VITALS — SYSTOLIC BLOOD PRESSURE: 104 MMHG | DIASTOLIC BLOOD PRESSURE: 66 MMHG | OXYGEN SATURATION: 91 %

## 2021-01-11 VITALS — OXYGEN SATURATION: 93 %

## 2021-01-11 VITALS — SYSTOLIC BLOOD PRESSURE: 112 MMHG | DIASTOLIC BLOOD PRESSURE: 70 MMHG

## 2021-01-11 VITALS — OXYGEN SATURATION: 92 %

## 2021-01-11 VITALS — SYSTOLIC BLOOD PRESSURE: 133 MMHG | DIASTOLIC BLOOD PRESSURE: 77 MMHG

## 2021-01-11 LAB
ALBUMIN SERPL BCG-MCNC: 3.1 GM/DL (ref 3.2–5.2)
ALT SERPL W P-5'-P-CCNC: 15 U/L (ref 12–78)
APTT BLD: 31.4 SECONDS (ref 24.2–38.5)
BILIRUB CONJ SERPL-MCNC: 0.2 MG/DL (ref 0–0.2)
BILIRUB SERPL-MCNC: 0.4 MG/DL (ref 0.2–1)
BUN SERPL-MCNC: 36 MG/DL (ref 7–18)
CALCIUM SERPL-MCNC: 8.8 MG/DL (ref 8.8–10.2)
CHLORIDE SERPL-SCNC: 101 MEQ/L (ref 98–107)
CO2 SERPL-SCNC: 31 MEQ/L (ref 21–32)
CREAT SERPL-MCNC: 1.67 MG/DL (ref 0.55–1.3)
FERRITIN SERPL-MCNC: 493 NG/ML (ref 8–252)
FIBRINOGEN PPP-MCNC: 486 MG/DL (ref 221–452)
GFR SERPL CREATININE-BSD FRML MDRD: 31.9 ML/MIN/{1.73_M2} (ref 39–?)
GLUCOSE SERPL-MCNC: 96 MG/DL (ref 70–100)
HCT VFR BLD AUTO: 43.7 % (ref 36–47)
HGB BLD-MCNC: 13.9 G/DL (ref 12–15.5)
INR PPP: 1.09
LYMPHOCYTES NFR BLD MANUAL: 15 % (ref 16–44)
MAGNESIUM SERPL-MCNC: 2 MG/DL (ref 1.8–2.4)
MCH RBC QN AUTO: 31.5 PG (ref 27–33)
MCHC RBC AUTO-ENTMCNC: 31.8 G/DL (ref 32–36.5)
MCV RBC AUTO: 99.1 FL (ref 80–96)
MONOCYTES NFR BLD MANUAL: 6 % (ref 0–5)
NEUTROPHILS NFR BLD MANUAL: 69 % (ref 28–66)
NT-PRO BNP: 402 PG/ML (ref ?–125)
PLATELET # BLD AUTO: 346 10^3/UL (ref 150–450)
PLATELET BLD QL SMEAR: NORMAL
POTASSIUM SERPL-SCNC: 4.3 MEQ/L (ref 3.5–5.1)
PROT SERPL-MCNC: 6.9 GM/DL (ref 6.4–8.2)
PROTHROMBIN TIME: 14.3 SECONDS (ref 12.5–14.3)
RBC # BLD AUTO: 4.41 10^6/UL (ref 4–5.4)
RBC MORPH BLD: NORMAL
SODIUM SERPL-SCNC: 139 MEQ/L (ref 136–145)
VARIANT LYMPHS NFR BLD MANUAL: 10 % (ref 0–5)
WBC # BLD AUTO: 6.4 10^3/UL (ref 4–10)

## 2021-01-11 RX ADMIN — ENOXAPARIN SODIUM SCH MG: 40 INJECTION SUBCUTANEOUS at 09:53

## 2021-01-11 RX ADMIN — METOPROLOL SUCCINATE SCH MG: 25 TABLET, EXTENDED RELEASE ORAL at 09:53

## 2021-01-11 RX ADMIN — Medication SCH MG: at 09:51

## 2021-01-11 RX ADMIN — FUROSEMIDE SCH MG: 10 INJECTION, SOLUTION INTRAMUSCULAR; INTRAVENOUS at 09:50

## 2021-01-11 RX ADMIN — DEXAMETHASONE SODIUM PHOSPHATE SCH MG: 4 INJECTION, SOLUTION INTRAMUSCULAR; INTRAVENOUS at 09:51

## 2021-01-11 RX ADMIN — PANTOPRAZOLE SODIUM SCH MG: 40 TABLET, DELAYED RELEASE ORAL at 09:51

## 2021-01-11 NOTE — DS.PDOC
Discharge Summary


General


Date of Admission


Jan 8, 2021 at 17:51


Date of Discharge


1/11/21





Discharge Summary


Chief complaints :Shortness of breath





History of present illness and Hospital course


Patient is 75 years old female with past history of hypertension presented to 

the hospital with increased shortness of breath and cough. According to patient 

her daughter pulses positive for Covid 19 a few days ago. Patient stated that 

she was started cough 2 days ago. In ER patient was found to have tachypnea, 

acute hypoxemic respiratory failure, positive Covid test . CTA was done and 

showed No obvious pulmonary embolus is identified. Chronic bilateral parenchymal

disease with suspected moderate areas of early peripheral opacities suggested.  

May be related to multifocal pneumonia or early COVID-19 pulmonary disease. The 

patient continued to be on 4 L of nasal cannula and eventually has been down to 

3 L, saturating around 94. The patient was started on dexamethasone and 

Levaquin. She also got 4 days of The patient had a mild AK I which was likely 

secondary to her contrast-induced nephropathy which has been almost stable. The 

patient has been doing well, participated in physical therapy has been improving

her oxygenation and is clinically stable for discharge. Home O2 evaluation will 

be done and the oxygen will be set up. She has been advised to follow-up with 

PCP in 1 week and will be given dexamethasone 2 mg daily for the next 4 days 

along with a Ventolin inhaler.








PHYSICAL EXAMINATION:


General:  The patient is awake, alert, oriented x3, sitting up in the bed in no 

apparent distress.


Head and Neck Exam:  Extraocular muscles intact.  Pupils equally round and 

reactive to light.  Mucous membranes are moist.


Neck is supple.  There is no jugular venous distention (JVD).


Cardiovascular:  S1 and S2, regular rate.  Trace edema of the bilateral lower 

extremities.


Respiratory: Clear on auscultation bilaterally


Abdomen:  Soft.  Positive bowel sounds.  Nontender.  No organomegaly.


Genitourinary: Deferred


Musculoskeletal:  no cyanosis was noted.


Central Nervous System (CNS):  No focal deficit.  Power is 5/5 in all 

extremities.





Mediictations. As per discharge reconciliation medication list





Activity as tolerated





Diet. 2 g sodium diet





Follow-up appointments. PCP in 1 week.





Condition on discharge. Patient is medically optimized for discharge





Discharge disposition: Home





Total time spent on this discharge including coordination of care, review of 

chart documentation and actual patient contact is around 35 minutes





Vital Signs/I&Os





Vital Signs








  Date Time  Temp Pulse Resp B/P (MAP) Pulse Ox O2 Delivery O2 Flow Rate FiO2


 


1/11/21 12:00     91 Nasal Cannula 4.0 


 


1/11/21 12:00 96.4 70 20 104/66 (79)    














I&O- Last 24 Hours up to 6 AM 


 


 1/11/21





 06:00


 


Intake Total 840 ml


 


Output Total 2470 ml


 


Balance -1630 ml











Laboratory Data


Labs 24H


Laboratory Tests 2


1/10/21 18:28: 


D-Dimer, Quantitative 1782.86H, C-Reactive Protein, Quantitative 1.77H


1/11/21 07:34: 


Neutrophils (%) (Auto) , Nucleated Red Blood Cells % (auto) 0.0, Neutrophils 

69H, Lymphocytes (Manual) 15L, Monocytes (Manual) 6H, Atypical Lymphocytes 10H, 

Red Blood Cell Morphology NORMAL, Platelet Estimate NORMAL, Prothrombin Time 

14.3H, Prothromb Time International Ratio 1.09, Activated Partial Thromboplast 

Time 31.4, Fibrinogen 486H, Anion Gap 7L, Glomerular Filtration Rate 31.9L, 

Calcium Level 8.8, Magnesium Level 2.0, Ferritin 493H, Total Bilirubin 0.4, 

Direct Bilirubin 0.2, Aspartate Amino Transf (AST/SGOT) 47H, Alanine 

Aminotransferase (ALT/SGPT) 15, Alkaline Phosphatase 63, NT-Pro-B-Type 

Natriuretic Peptide 402H, Total Protein 6.9, Albumin 3.1L, Albumin/Globulin 

Ratio 0.8L, Procalcitonin 0.05


CBC/BMP


Laboratory Tests


1/11/21 07:34











Microbiology





Microbiology


1/8/21 Respiratory Virus Panel (PCR) ([facility]) - Final, Complete


         SARS-CoV-2 (COVID 19)


1/8/21 Blood Culture - Preliminary, Resulted


         No Growth after 48 hours. All Specime...


1/8/21 Blood Culture - Preliminary, Resulted


         No Growth after 48 hours. All Specime...





Discharge Medications


Scheduled


Dexamethasone (Dexamethasone) 2 Mg Tablet, 2 MG PO DAILY


Metoprolol Succinate (Metoprolol Succinate) 25 Mg Tab.er.24h, 25 MG PO DAILY, 

(Reported)


Pantoprazole Sodium (Pantoprazole Sodium) 40 Mg Tablet.dr, 40 MG PO DAILY, 

(Reported)


Triamterene/Hydrochlorothiazid (Triamterene-Hctz 37.5-25 mg Cp) 1 Each Capsule, 

1 CAP PO DAILY, (Reported)





Scheduled PRN


Albuterol Sulfate (Ventolin Hfa) 18 Gm Hfa.aer.ad, 2 PUFF INH Q4-6HP PRN for 

wheezing





Allergies


Coded Allergies:  


     aspirin (Verified  Adverse Reaction, Unknown, ulcers, 1/22/20)











LASHON THOMAS MD             Jan 11, 2021 14:51

## 2021-02-16 ENCOUNTER — HOSPITAL ENCOUNTER (INPATIENT)
Dept: HOSPITAL 53 - M ED | Age: 76
LOS: 3 days | Discharge: HOME HEALTH | DRG: 189 | End: 2021-02-19
Attending: INTERNAL MEDICINE | Admitting: INTERNAL MEDICINE
Payer: MEDICARE

## 2021-02-16 VITALS — OXYGEN SATURATION: 66 %

## 2021-02-16 VITALS — HEIGHT: 63 IN | WEIGHT: 209.22 LBS | BODY MASS INDEX: 37.07 KG/M2

## 2021-02-16 VITALS — OXYGEN SATURATION: 94 %

## 2021-02-16 VITALS — OXYGEN SATURATION: 86 %

## 2021-02-16 VITALS — DIASTOLIC BLOOD PRESSURE: 84 MMHG | SYSTOLIC BLOOD PRESSURE: 128 MMHG

## 2021-02-16 VITALS — OXYGEN SATURATION: 92 %

## 2021-02-16 DIAGNOSIS — J69.0: ICD-10-CM

## 2021-02-16 DIAGNOSIS — E66.9: ICD-10-CM

## 2021-02-16 DIAGNOSIS — J84.10: ICD-10-CM

## 2021-02-16 DIAGNOSIS — Z88.6: ICD-10-CM

## 2021-02-16 DIAGNOSIS — Z79.899: ICD-10-CM

## 2021-02-16 DIAGNOSIS — E87.3: ICD-10-CM

## 2021-02-16 DIAGNOSIS — Z87.891: ICD-10-CM

## 2021-02-16 DIAGNOSIS — Z66: ICD-10-CM

## 2021-02-16 DIAGNOSIS — I50.32: ICD-10-CM

## 2021-02-16 DIAGNOSIS — K44.9: ICD-10-CM

## 2021-02-16 DIAGNOSIS — Z86.16: ICD-10-CM

## 2021-02-16 DIAGNOSIS — J96.21: Primary | ICD-10-CM

## 2021-02-16 DIAGNOSIS — Z99.81: ICD-10-CM

## 2021-02-16 DIAGNOSIS — D47.3: ICD-10-CM

## 2021-02-16 DIAGNOSIS — E83.42: ICD-10-CM

## 2021-02-16 DIAGNOSIS — I11.0: ICD-10-CM

## 2021-02-16 LAB
ALBUMIN SERPL BCG-MCNC: 3 GM/DL (ref 3.2–5.2)
ALT SERPL W P-5'-P-CCNC: 7 U/L (ref 12–78)
APTT BLD: 40.6 SECONDS (ref 24.2–38.5)
BASOPHILS # BLD AUTO: 0.1 10^3/UL (ref 0–0.2)
BASOPHILS NFR BLD AUTO: 0.8 % (ref 0–1)
BILIRUB SERPL-MCNC: 0.5 MG/DL (ref 0.2–1)
BUN SERPL-MCNC: 16 MG/DL (ref 7–18)
CALCIUM SERPL-MCNC: 9.9 MG/DL (ref 8.8–10.2)
CHLORIDE SERPL-SCNC: 96 MEQ/L (ref 98–107)
CK MB CFR.DF SERPL CALC: 2.44
CK MB CFR.DF SERPL CALC: 3.75
CK MB SERPL-MCNC: 1 NG/ML (ref ?–3.6)
CK MB SERPL-MCNC: 2.1 NG/ML (ref ?–3.6)
CK SERPL-CCNC: 41 U/L (ref 26–192)
CK SERPL-CCNC: 56 U/L (ref 26–192)
CO2 SERPL-SCNC: 36 MEQ/L (ref 21–32)
CREAT SERPL-MCNC: 1.26 MG/DL (ref 0.55–1.3)
CRP SERPL-MCNC: 2.19 MG/DL (ref 0–0.3)
D DIMER PPP DDU-MCNC: 2064.27 NG/ML (ref ?–500)
DIGOXIN SERPL-MCNC: 0.1 NG/ML (ref 0.5–2)
EOSINOPHIL # BLD AUTO: 0.2 10^3/UL (ref 0–0.5)
EOSINOPHIL NFR BLD AUTO: 1.8 % (ref 0–3)
EST. AVERAGE GLUCOSE BLD GHB EST-MCNC: 123 MG/DL (ref 60–110)
FERRITIN SERPL-MCNC: 350 NG/ML (ref 8–252)
FIBRINOGEN PPP-MCNC: 641 MG/DL (ref 221–452)
GFR SERPL CREATININE-BSD FRML MDRD: 44.1 ML/MIN/{1.73_M2} (ref 39–?)
GLUCOSE SERPL-MCNC: 98 MG/DL (ref 70–100)
HCT VFR BLD AUTO: 37.8 % (ref 36–47)
HGB BLD-MCNC: 11.9 G/DL (ref 12–15.5)
INR PPP: 1.03
LDH SERPL L TO P-CCNC: 234 U/L (ref 84–246)
LYMPHOCYTES # BLD AUTO: 1.4 10^3/UL (ref 1.5–5)
LYMPHOCYTES NFR BLD AUTO: 14.6 % (ref 24–44)
MAGNESIUM SERPL-MCNC: 1.5 MG/DL (ref 1.8–2.4)
MCH RBC QN AUTO: 30.8 PG (ref 27–33)
MCHC RBC AUTO-ENTMCNC: 31.5 G/DL (ref 32–36.5)
MCV RBC AUTO: 97.9 FL (ref 80–96)
MONOCYTES # BLD AUTO: 1 10^3/UL (ref 0–0.8)
MONOCYTES NFR BLD AUTO: 10.5 % (ref 2–8)
NEUTROPHILS # BLD AUTO: 6.7 10^3/UL (ref 1.5–8.5)
NEUTROPHILS NFR BLD AUTO: 70.2 % (ref 36–66)
PLATELET # BLD AUTO: 496 10^3/UL (ref 150–450)
POTASSIUM SERPL-SCNC: 4.5 MEQ/L (ref 3.5–5.1)
PROT SERPL-MCNC: 8.1 GM/DL (ref 6.4–8.2)
PROTHROMBIN TIME: 13.7 SECONDS (ref 12.5–14.3)
RBC # BLD AUTO: 3.86 10^6/UL (ref 4–5.4)
SODIUM SERPL-SCNC: 135 MEQ/L (ref 136–145)
T4 FREE SERPL-MCNC: 1.16 NG/DL (ref 0.76–1.46)
TROPONIN I SERPL-MCNC: < 0.02 NG/ML (ref ?–0.1)
TROPONIN I SERPL-MCNC: < 0.02 NG/ML (ref ?–0.1)
TSH SERPL DL<=0.005 MIU/L-ACNC: 3.18 UIU/ML (ref 0.36–3.74)
WBC # BLD AUTO: 9.5 10^3/UL (ref 4–10)

## 2021-02-16 RX ADMIN — MAGNESIUM SULFATE IN DEXTROSE SCH MLS/HR: 10 INJECTION, SOLUTION INTRAVENOUS at 21:02

## 2021-02-16 RX ADMIN — MAGNESIUM SULFATE IN DEXTROSE SCH MLS/HR: 10 INJECTION, SOLUTION INTRAVENOUS at 21:14

## 2021-02-16 RX ADMIN — SODIUM CHLORIDE SCH UNITS: 4.5 INJECTION, SOLUTION INTRAVENOUS at 21:00

## 2021-02-16 NOTE — CCD
Continuity of Care Document (CCD)

                             Created on: 2021



[employer]

External Reference #: [id number]

: 1945

Sex: Female



Demographics





                          Address                   [address] [place]  [address]

 

                          Home Phone                [phone]

 

                          Preferred Language        Unknown

 

                          Marital Status            Unknown

 

                          Hinduism Affiliation     Unknown

 

                          Race                      White

 

                          Ethnic Group              Not  or 





Author





                          Author                    Makeda PINO [facility]

 

                          Organization              Unknown

 

                          Address                   [address] [address]

[place]  [address]



 

                          Phone                     [phone]







Care Team Providers





                    Care Team Member Name Role                Phone

 

                    [facility] - [facility]opaedic Surgery [facility]  

              [phone]







Problems





                                        Description

 

                                        No Information Available







Social History





                Type            Date            Description     Comments

 

                Birth Sex                       Unknown          

 

                Tobacco Use     Start: Unknown  Never Used Smokeless Tobacco  

 

                ETOH Use                        Denies alcohol use  

 

                Tobacco Use     Start: Unknown End: Unknown Patient is a former 

smoker  

 

                Recreational Drug Use                 Never Used Drugs  

 

                Smoking Status  Reviewed: 20 Patient is a former smoker  

 

                Exercise Type/Frequency                 Exercises rarely  

 

                Tattoo/Piercing                 None             

 

                Sun Exposure                    Moderate amount of sun exposure 

 

 

                Seat Belt/Car Seat                 Always uses seat belt  

 

                Bike Helmet                     Never            

 

                Smoke Alarms                    Yes              

 

                Smoke Alarms                    Carbon Monoxide Detector: Yes  







Allergies, Adverse Reactions, Alerts





                                        Description

 

                                        No Known Drug Allergies







Medications





           Active Medications SIG        Qnty       Indications Ordering Provide

r Date

 

                          Ventolin HFA                     108(90Base) mcg/Act A

erosol                   2

puffs every 4-6 hours as needed for wheezing                                 Unk

nown         2021

 

                          Pantoprazole Sodium                     40mg Tablets D

R                   take 

one tablet by mouth every day for heartburn/ acid reflux 90tabs                 

                 Khadra Pino FNP                              

 

                                        Triamterene/Hydrochlorothiazide         

            37.5-25mg Capsules          

                    take one capsule by mouth every morning for high blood press

ure 90caps               

                          Khadra Pino FNP      

 

                                        Metoprolol Succinate ER                 

    25mg Tablets ER 24HR                

             1 by mouth every day 90tabs                    Khadra Pino FNP

 

 

                                        History Medications

 

                          Dexamethasone                     2mg Tablets         

          one tablet by 

mouth once a day x 5 days                                 Unknown         2021 - 2021







Immunizations





             CPT Code     Status       Date         Vaccine      Lot #

 

                [id number]           Given           2020      Influenza Virus 

Vaccine, Quadrivalent,age 3 and 

up,multidose vial                       [id number]

 

             [id number]        Given        2020   Prevnar 13   [id number]







Vital Signs





                Date            Vital           Result          Comment

 

                2020  1:34pm BP Systolic     110 mmHg         

 

                    BP Diastolic        84 mmHg              

 

                    Heart Rate          93 /min              

 

                    Body Temperature    96.9 F             

 

                    Respiratory Rate    17 /min              

 

                    Height              62.0 inches         5'2"

 

                    Weight              204.25 lb            

 

                    O2 % BldC Oximetry  93 %                 

 

                    Peak Expiratory Flow Rate 305                 Estimated Peak

 Flow Rate

 

                    Ideal Body Weight   110 lb               

 

                    BMI (Body Mass Index) 37.4 kg/m2           

 

                2020  4:04pm BP Systolic     129 mmHg         

 

                    BP Diastolic        88 mmHg              

 

                    Heart Rate          90 /min              

 

                    Body Temperature    96.8 F             

 

                    Respiratory Rate    17 /min              

 

                    Height              62.0 inches         5'2"

 

                    Weight              204.50 lb            

 

                    O2 % BldC Oximetry  98 %                 

 

                    Peak Expiratory Flow Rate 305                 Estimated Peak

 Flow Rate

 

                    Ideal Body Weight   110 lb               

 

                    BMI (Body Mass Index) 37.4 kg/m2           







Results





                                        Description

 

                                        No Information Available







Procedures





                Date            Code            Description     Status

 

                2007      52129418        Colonoscopy     Completed







Medical Devices





                                        Description

 

                                        No Information Available







Encounters





           Type       Date       Location   Provider   Dx         Diagnosis

 

           Office Visit 2021 12:00p Main Office Khadra Pino, FNP R06.0

2     Shortness

of breath

 

           Office Visit 2020  1:30p Main Office Pleskach Khadra, FNP I10  

      Essential 

(primary) hypertension

 

                          K21.9                     Gastro-esophageal reflux dis

ease without esophagitis

 

                          M79.671                   Pain in right foot

 

                          M25.561                   Pain in right knee

 

           Office Visit 2020  4:00p Main Office Leidy Pinoy, FNP M79.6

71    Pain in 

right foot







Assessments





                Date            Code            Description     Provider

 

                2021      R06.02          Shortness of breath Pleskach Mo

lly, FNP

 

                2020      I10             Essential (primary) hypertension

 Pleyayo Khadra, FNP

 

                2020      K21.9           Gastro-esophageal reflux disease

 without esophagitis Terrell 

Khadra, FNP

 

                2020      M79.671         Pain in right foot Pleskach, Mol

ly, FNP

 

                2020      M25.561         Pain in right knee Pleskach, Mol

ly, FNP

 

                2020      M79.671         Pain in right foot Pleskach, Mol

ly, FNP







Plan of Treatment

Future Appointment(s):* 2021  1:00 pm - Khadra Pino FNP at Main 
  Office





Functional Status





                Functional Condition Comment         Date            Status

 

                Glasses                                         Active

 

                Independent with all ADL's                                 Activ

e

 

                Independent with all IADL's                                 Acti

ve







Mental Status





                Mental Condition Comment         Date            Status

 

                None                                            Active







Referrals





                Refer to Dr     Reason for Referral Status          Appt Date

 

                [facility] right foot pain  Closed         

 

 

                                        [address] [place] [address]

 

                                        [phone]

## 2021-02-16 NOTE — REP
INDICATION:

elevated ddimer.



COMPARISON:

Comparison CT studies are from 8 January 2021 and 22 January 2020..



TECHNIQUE:

Contrast dose:  75 ML of Isovue 370 are administered intravenously.

CT technique:  Helical scanning is acquired and overlapping 1.5 mm and contiguous 3 mm

axial images are reformatted.  In addition, maximum intensity projection and

multiplanar re-formation images are generated in sagittal and coronal imaging

projections.



FINDINGS:

There is good opacification in the pulmonary arterial tree.  There is no evidence of

vessel cut off or filling defect to suggest pulmonary embolus.  Homogeneous opacity is

seen in the thoracic aorta.  There is no evidence of aneurysm or dissection.  There is

dilation of the main pulmonary artery and and the main right and left pulmonary

arteries.  The main pulmonary artery measures 3.7 cm in greatest transverse dimension.

The ascending aorta is somewhat ectatic as well measuring 3.9 cm.  No filling defect

is seen.  There is no evidence of aortic dissection.  There is however a focal

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

aorta.  This measures 18 mm in craniocaudal span by 11 mm in anteroposterior span from

the expected location of the posterior aortic wall.  This is unchanged from the 22 January 2020 prior CT study.  No other evidence of thoracic aneurysm is seen.  There

is a very large hiatal hernia containing the gastric fundus as well as the body and

tail of the pancreas.  This is unchanged as well.  No hilar or mediastinal mass or

adenopathy is observed.

Lung window settings demonstrate progressive interstitial opacification in the right

upper lobe more prominent than on the 8 January 2021 study.  There is some progressive

predominantly peripheral interstitial opacity in the right lower lobe as well.  There

are peripheral patchy interstitial infiltrates in the left upper lobe which are

slightly more prominent than on the prior study.  No pleural effusion is seen.

In the upper abdomen, there is some fatty infiltration of the liver.  Normal adrenal

glands.  There is an intrarenal calcification in the upper pole of the left kidney.



IMPRESSION:

No CT evidence of pulmonary embolus. Huge hiatal hernia containing the gastric fundus

and the body and tail of the pancreas.  Progressive interstitial infiltrates

particularly in the right upper lobe consistent with pneumonia.  Cardiomegaly.

Centrally dilated pulmonary arteries consistent with pulmonary arterial hypertension.

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

unchanged from comparison CT study 22 January 2020.





<Electronically signed by Seb Christian > 02/16/21 [id number]

## 2021-02-16 NOTE — CR
CONSULTATION

DATE: 02/16/2021



REASON FOR CONSULTATION:  Hypoxia, abnormal chest CT. 



REQUESTING PROVIDER: Khubaib N. Gondal, M.D. 



HISTORY OF PRESENT ILLNESS:  Ms. Babcock is a very pleasant 76-year-old female

who presents to the hospital with increased shortness of breath. She was

hospitalized in early January with COVID. She states she did fairly well and

was sent home on oxygen. It was not until that past Saturday, she noticed a new

onset of shortness of breath. Due to that symptomatology, CT angiogram was

performed in the emergency room showing no evidence of pulmonary embolism.  CT 
scan did demonstrate however

a dilated esophagus, very large hiatal hernia, and basilar fibrosis more

prominent on the right with worsening right infiltrate and inflammation. The

patient was more hypoxic than she was at home now on 5 liters and I was

consulted. Dr. Gondal correctly identified the patient's procalcitonin is low

and white count remained within normal limits, although above her usual white

count of 6. He has not prescribed antibiotics as he is rightfully concerned

that this may not be infectious. 



The patient herself says she has an intermittent cough. She does not feel as

sick as she did when she had COVID. She does not feel that she has a

respiratory infection. She has had no fevers or chills. She denies any night

sweats. Her cough is intermittent, nonproductive. She states she had one

episode where she had a little bit of bloody mucous, but this was right after

she had a nosebleed. She has had no chest discomfort, no chest pain, and no

pleurisy. She has no orthopnea, no PND, no lower extremity edema, and no calf

pain. She states she saw a cardiologist recently and was told that her heart

was "good." She has no prior history of lupus or inflammatory diseases. When I

asked about arthritis, she states she carries a diagnosis of osteoarthritis.

She has had migratory myalgias and arthralgias. She states just over a month

ago just prior to COVID, she was having inflamed joints; first her right ankle

and then that improved and then, her left ankle. She states it was almost like

her whole foot swelled at the time. She has had no elbow effusions. No change

in vision. No symptoms of uveitis. No rashes. She has no family history of

connective tissue disease. She also denies any prior history of lung disease.

She does not recall any episodes of aspiration. No vomiting. She denies any

change in her usual bowel habits. 



PAST MEDICAL HISTORY:

1.  Hypertension. 

2.  Pneumonia for which she was hospitalized in January of last year; however,

    it was felt according to that H&P, on my review, that it was more consistent

    with congestive heart failure, and she had a pleural effusion at that point

    in time. It does not appear that the pleural effusion was tapped. 

3.  Obesity. 

4.  Large hiatal hernia status post attempted repair. 



FAMILY HISTORY: She states her father had lung disease and her mother had

diabetes. She states her father was a smoker. She denies any history of

pulmonary fibrosis in her family or connective tissue disorders. 



SOCIAL HISTORY: The patient is a lifetime nonsmoker. She denies any illicit

drug use. She has approximately two to three glasses of wine a month. She lives

with her son and daughter-in-law as she is . She lives in [place] and

has had no recent travel. 



CURRENT MEDICATIONS:

1.  Proventil. 

2.  Flonase. 

3.  Toprol XL 25 mg p.o. daily.

4.  Dyazide. 

5.  Triamterene/hydrochlorothiazide one tab p.o. daily.

6.  Heparin 5000 units subcutaneous q. 12 hours. 



ALLERGIES: She states she is allergic to ASPIRIN.



REVIEW OF SYSTEMS: Constitutional: No fever, chills, night sweats. No weight

loss. HEENT: No change in vision. One episode of epistaxis. No recurrent

sinusitis. No dental abscess. Patient does have occasional difficulty

swallowing items such as meat. No difficulty swallowing liquids. No change in

her voice. She has had no neck pain. Cardiac: No anginal symptoms. No

orthopnea, PND, or lower extremity edema. No symptoms of claudication.

Pulmonary: As per HPI. No history of tuberculosis or tuberculosis contacts. GI:

As mentioned in HPI, no blood in stool and no change in bowel habits. No

nausea, vomiting, or diarrhea. : No burning or pain with urination. Does have

some urinary incontinence. Neuro: No history of stroke, seizure, or tremor. No

difficulty with ambulation. Integumentary: No recent rashes or pruritus. Psych:

No depression, anxiety or suicidal. Sleep: No history of diagnosed obstructive

sleep apnea. The patient has been told that she snores. Denies witnessed

apneas, morning headaches, or excessive daytime somnolence. Endocrine: Denies

history of diabetes or hypothyroidism. Denies hot or cold intolerance. 



PHYSICAL EXAMINATION:

VITAL SIGNS: Temperature is 97.6, pulse is 83, respiratory rate 24, blood

pressure 124/90 with a MAP of 101. Oxygen saturation is 94% on 5 liters. 

GENERAL: Awake, alert, and oriented. Affect and mood are appropriate. Nutrition

and hygiene are good.

HEENT:  Sclerae clear and anicteric. Pupils equal and reactive to light. Mucous

membranes are moist without lesions. Tongue is midline. Oropharynx is crowded;

Mallampati 4. 

NECK: Supple. No tracheal deviation. Large circumference. 

LYMPH: No cervical, supraclavicular, or axillary adenopathy. 

CARDIAC: Regular S1, S2 without audible murmur, rub, or gallop. No elevated

JVP. PMI is difficult to palpate. 

PULMONARY: Diffuse inspiratory rales bilaterally. There is increased thoracic

kyphosis. Poor chest expansion. No prolongation of expiratory phase. No

expiratory wheeze.

ABDOMEN: Obese, soft, nontender, and nondistended. No discernable

hepatosplenomegaly or masses. 

EXTREMITIES: No cyanosis, clubbing, or edema. 

SKIN: No rash, jaundice, or bruising. Skin is otherwise pale. 

MUSCULOSKELETAL: NO evidence of joint effusion, lesion, or recent trauma. 



LABORATORY DATA: Evaluation shows a white count of 9.5 up from her last white

count of 6.4, hemoglobin 11.9, platelet count of 496,000, neutrophils of 70.

Sodium 135, potassium 4.5, chloride 96, bicarb 36, BUN 16, creatinine 1.26.

Lactate is 1.2. Magnesium is 1.5. Ferritin is 350 with a CRP of 2.19 and

albumin of 3.0.



IMAGING DATA: Chest CT, as mentioned above, shows a dilated esophagus, very

large hiatal hernia, some cardiomegaly with basilar fibrosis, and right-sided

infiltrate.



IMPRESSION/PLAN:

1.  Hypoxia with wide differential. I agree with Dr. Gondal that this does not

    seem as if she has an active infection despite new infiltrate. This can be

    inflammatory in nature or be a residual from COVID.  This could also be

    secondary to an acute exacerbation of rheumatologic disease. I have ordered

    a rheumatologic screen. She does have some symptoms of rheumatoid arthritis

    and has some fibrotic change at the bases, which may be representative of

    rheumatoid disease. It is also interesting she had a pleural effusion last

    year and this was not tapped. She had significant pleuritic symptoms at that

    time. This may or may not be related. In the meantime, I have added

    steroids to her regimen. I would doubt she would be able to tolerate a lung

    biopsy given the restriction from her body habitus. This could also be

    residual inflammatory disease from COVID. Would continue to monitor for

    signs and symptoms of recurrent infection. I suspect she does have recurrent

    aspiration, based on the appearance of her dilated proximal esophagus and

    very large hiatal hernia. At this point in time, she has no fevers or other

    signs or symptoms of infection, but we will continue to monitor and have a

    low threshold for initiating antibiotics. 

2.  Metabolic alkalosis based on blood work. Will obtain arterial blood gas to

    see if this is compensatory or a primary metabolic alkalosis. 



Thank you for this interesting consult. Overall appears to be inflammatory

rather than infectious, but we will continue to monitor. 

KELLYD

## 2021-02-16 NOTE — REP
INDICATION:

Coronavirus workup.



COMPARISON:

Comparison chest x-ray 8 January 2021.



TECHNIQUE:

Portable upright AP chest radiograph.



FINDINGS:

There is a very large hiatal hernia overlying the left heart unchanged.  Cardiomegaly

is observed.  This is also unchanged.  There is fairly dense interstitial infiltrate

in the right upper lobe new 22 January 2020 and 8 January 2021 prior studies.  There

is also interstitial infiltrate in the right base.  There are increased interstitial

markings in the left base although these may be chronic.  Pleural angles are sharp.

The aorta is tortuous..



IMPRESSION:

Right upper lobe and right base infiltrate consistent with pneumonia.  Cardiomegaly.

Large hiatal hernia.  Interstitial fibrosis pattern..





<Electronically signed by Seb Christian > 02/16/21 [id number]

## 2021-02-16 NOTE — CCD
Continuity of Care Document (CCD)

                             Created on: 2021



[employer]

External Reference #: [id number]

: 1945

Sex: Female



Demographics





                          Address                   [address] [place]  [address]

 

                          Home Phone                [phone]

 

                          Preferred Language        Unknown

 

                          Marital Status            Unknown

 

                          Restorationist Affiliation     Unknown

 

                          Race                      White

 

                          Ethnic Group              Not  or 





Author





                          Author                    Makeda PINO [facility]

 

                          Organization              Unknown

 

                          Address                   [address]  [address]

[place]  [address]



 

                          Phone                     [phone]







Care Team Providers





                    Care Team Member Name Role                Phone

 

                    [facility] - Orthopaedic Surgery AUTM  

              [phone]







Problems





                                        Description

 

                                        No Information Available







Social History





                Type            Date            Description     Comments

 

                Birth Sex                       Unknown          

 

                Tobacco Use     Start: Unknown  Never Used Smokeless Tobacco  

 

                ETOH Use                        Denies alcohol use  

 

                Tobacco Use     Start: Unknown End: Unknown Patient is a former 

smoker  

 

                Recreational Drug Use                 Never Used Drugs  

 

                Smoking Status  Reviewed: 20 Patient is a former smoker  

 

                Exercise Type/Frequency                 Exercises rarely  

 

                Tattoo/Piercing                 None             

 

                Sun Exposure                    Moderate amount of sun exposure 

 

 

                Seat Belt/Car Seat                 Always uses seat belt  

 

                Bike Helmet                     Never            

 

                Smoke Alarms                    Yes              

 

                Smoke Alarms                    Carbon Monoxide Detector: Yes  







Allergies, Adverse Reactions, Alerts





                                        Description

 

                                        No Known Drug Allergies







Medications





           Active Medications SIG        Qnty       Indications Ordering Provide

r Date

 

                                        Albuterol Sulfate                     (2

.5mg/3ML) 0.083% Nebulizer              

                                        1 unit dose ampule in nebulizer and inha

led up to 4 times a day for coughing

and wheezing    180ml                           Khadra Pino FNP 2021

 

                          Ventolin HFA                     108(90Base) mcg/Act A

erosol                   2

puffs every 4-6 hours as needed for wheezing                                 Unk

nown         2021

 

                          Pantoprazole Sodium                     40mg Tablets D

R                   take 

one tablet by mouth every day for heartburn/ acid reflux 90tabs                 

                 Khadra Pino FNP                              

 

                                        Triamterene/Hydrochlorothiazide         

            37.5-25mg Capsules          

                    take one capsule by mouth every morning for high blood press

ure 90caps               

                          Khadra Pino FNP      

 

                                        Metoprolol Succinate ER                 

    25mg Tablets ER 24HR                

             1 by mouth every day 90tabs                    Khadra Pino FNP

 

 

                                        History Medications

 

                          Dexamethasone                     2mg Tablets         

          one tablet by 

mouth once a day x 5 days                                 Unknown         2021 - 2021







Immunizations





             CPT Code     Status       Date         Vaccine      Lot #

 

                [id number]           Given           2020      Influenza Virus 

Vaccine, Quadrivalent,age 3 and 

up,multidose vial                       [id number]

 

             [id number]        Given        2020   Prevnar 13   [id number]







Vital Signs





                Date            Vital           Result          Comment

 

                2020  1:34pm BP Systolic     110 mmHg         

 

                    BP Diastolic        84 mmHg              

 

                    Heart Rate          93 /min              

 

                    Body Temperature    96.9 F             

 

                    Respiratory Rate    17 /min              

 

                    Height              62.0 inches         5'2"

 

                    Weight              204.25 lb            

 

                    O2 % BldC Oximetry  93 %                 

 

                    Peak Expiratory Flow Rate 305                 Estimated Peak

 Flow Rate

 

                    Ideal Body Weight   110 lb               

 

                    BMI (Body Mass Index) 37.4 kg/m2           

 

                2020  4:04pm BP Systolic     129 mmHg         

 

                    BP Diastolic        88 mmHg              

 

                    Heart Rate          90 /min              

 

                    Body Temperature    96.8 F             

 

                    Respiratory Rate    17 /min              

 

                    Height              62.0 inches         5'2"

 

                    Weight              204.50 lb            

 

                    O2 % BldC Oximetry  98 %                 

 

                    Peak Expiratory Flow Rate 305                 Estimated Peak

 Flow Rate

 

                    Ideal Body Weight   110 lb               

 

                    BMI (Body Mass Index) 37.4 kg/m2           







Results





                                        Description

 

                                        No Information Available







Procedures





                Date            Code            Description     Status

 

                2007      [id number]        Colonoscopy     Completed







Medical Devices





                                        Description

 

                                        No Information Available







Encounters





           Type       Date       Location   Provider   Dx         Diagnosis

 

           Office Visit 2021  3:45p Main Office Khadra Pino FNP J12.8

2     Pneumonia

due to coronavirus disease 2019

 

           Office Visit 2021 12:00p Main Office Khadra Pino FNP R06.0

2     Shortness

of breath

 

           Office Visit 2020  1:30p Main Office Khadra Pino FNP I10  

      Essential 

(primary) hypertension

 

                          K21.9                     Gastro-esophageal reflux dis

ease without esophagitis

 

                          M79.671                   Pain in right foot

 

                          M25.561                   Pain in right knee

 

           Office Visit 2020  4:00p Main Office Khadra Pino FNP M79.6

71    Pain in 

right foot







Assessments





                Date            Code            Description     Provider

 

                2021      J12.82          Pneumonia due to coronavirus dis

ease 2019 Khadra Pino FNP

 

                2021      R06.02          Shortness of breath Francis Pino, FNP

 

                2020      I10             Essential (primary) hypertension

 Khadra Pino, LISAP

 

                2020      K21.9           Gastro-esophageal reflux disease

 without esophagitis Khadra Pino, FNP

 

                2020      M79.671         Pain in right foot Pleskach, Mol

ly, FNP

 

                2020      M25.561         Pain in right knee Pleskach, Mol

ly, FNP

 

                2020      M79.671         Pain in right foot Pleskach, Mol

ly, FNP







Plan of Treatment

Future Appointment(s):* 2021  1:00 pm - Khadra Pino FNP at Main 
  Office

2021 - Khadra Pino FNP* J12.82 Pneumonia due to coronavirus disease 
  * Comments:* continue O2, may titrate if oxygen levels are above 92%.  
  Patient advised to return to ER for any increase in difficulty breathing or 
  SOB.









Functional Status





                Functional Condition Comment         Date            Status

 

                Glasses                                         Active

 

                Independent with all ADL's                                 Activ

e

 

                Independent with all IADL's                                 Acti

ve







Mental Status





                Mental Condition Comment         Date            Status

 

                None                                            Active







Referrals





                Refer to Dr     Reason for Referral Status          Appt Date

 

                [facility] right foot pain  Closed         

 

 

                                        [address] [place] [address]

 

                                        [phone]

## 2021-02-16 NOTE — HPEPDOC
[facility] Medical History & Physical


Date of Admission


Feb 16, 2021


Date of Service:  Feb 16, 2021


Attending Physician:  GONDAL,KHUBAIB N. MD





History and Physical


CHIEF COMPLAINT: Shortness of breath





HISTORY OF PRESENT ILLNESS: 76-year-old female with past medical history of 

hypertension and recent diagnosis of presents from home with worsening dyspnea. 

Patient was sent home on home oxygen after being hospitalized for Covid last 

month. She was using 3 L of oxygen via nasal cannula, reports improvement in her

breast for symptoms over the past few weeks after leaving the hospital. Her 

symptoms started to worsen over the past 48 hours with worsening dyspnea. She 

denies any associated symptoms, reports mild cough over the past few weeks which

has remained unchanged over the last 48 hours. She denies any sputum production,

fever, chest pain, nausea, vomiting or diarrhea. She has a large hiatal hernia 

which is chronic, denies symptoms associated with GERD at this time. She denies 

any history of aspiration or dysphagia.





10 point review of system is negative except for above





PAST MEDICAL HISTORY:


1. Hypertension.


2. Hiatal hernia.





PAST SURGICAL HISTORY:


1. Nissen fundoplication.





SOCIAL HISTORY:


Previous smoker.


Social alcohol use.


Denies drug use





FAMILY HISTORY:


Father had emphysema.


Mother had diabetes mellitus





ALLERGIES: Please see below.





HOME MEDICATIONS: Please see below. 





PHYSICAL EXAMINATION:


VITAL SIGNS: Please see below.


GENERAL: No distress


HEENT: Normocephalic, atraumatic, moist mucous membranes


NECK: Supple


CARDIOVASCULAR EXAMINATION: S1, S2, no murmurs


RESPIRATORY EXAMINATION: Bilateral diffuse rhonchi, right greater than left. No 

wheezing appreciated


ABDOMINAL EXAMINATION: Soft, nontender, nondistended, positive bowel sounds


EXTREMITIES: Range of motion intact


SKIN: No rash


NEUROLOGICAL EXAMINATION: Alert and oriented 3, no focal deficits 


PSYCHIATRIC EXAMINATION: Calm and cooperative





LABORATORY DATA: See below.





IMAGING: CTA negative for PE, consistent with bilateral opacities, right greater

than left





MICROBIOLOGY: Please see below. 





ASSESSMENT: 76-year-old female with past medical history of hypertension and 

recent Covid is being admitted for hypoxemic respiratory failure.





PLAN:


1. Hypoxemic respiratory failure.


 Has been requiring supplemental oxygen since Covid diagnosis 1 month ago, now 

requiring increased oxygen via nasal cannula. Currently on 4 L.


 Etiology unclear, no clinical signs of pneumonia at this time.


 CTA negative for PE, bilateral opacities, right greater than left.


 Questionable chronic findings associated with Covid pneumonia.


 Pro-calcitonin negative. Respiratory viral panel negative. Blood cultures 

pending. 


 Received 1 dose of antibiotics in the emergency department, will hold off on 

further antibiotics at this time.


 Pulmonary consulted





2. Hypertension.


 Continue metoprolol, triamterene and hydrochlorothiazide





3. Hypomagnesemia.


 Supplemented IV





DVT prophylaxis: Heparin subcutaneous


GI prophylaxis: Not needed





Vital Signs





Vital Signs








  Date Time  Temp Pulse Resp B/P (MAP) Pulse Ox O2 Delivery O2 Flow Rate FiO2


 


2/16/21 15:54  84   98   


 


2/16/21 12:25      Nasal Cannula 5.0 


 


2/16/21 12:25        


 


2/16/21 10:56 97.6  24     











Laboratory Data


Labs 24H


Laboratory Tests 2


2/16/21 11:45: 


Prothrombin Time 13.7, Prothromb Time International Ratio 1.03, Activated 

Partial Thromboplast Time 40.6H, Fibrinogen 641H, D-Dimer, Quantitative 

2064.27H, Anion Gap 3L, Glomerular Filtration Rate 44.1, Estimated Mean Plasma 

Glucose 123H, Hemoglobin A1c 5.9, Lactic Acid Level 1.2, Calcium Level 9.9, Ma

gnesium Level 1.5L, Ferritin 350H, Total Bilirubin 0.5, Aspartate Amino Transf 

(AST/SGOT) 20, Alanine Aminotransferase (ALT/SGPT) 7L, Alkaline Phosphatase 79, 

Lactate Dehydrogenase 234, Total Creatine Kinase 41, Creatine Kinase MB 1.0, 

Creatine Kinase MB Relative Index 2.44, Troponin I < 0.02, C-Reactive Protein, 

Quantitative 2.19H, Total Protein 8.1, Albumin 3.0L, Albumin/Globulin Ratio 

0.6L, Thyroid Stimulating Hormone (TSH) 3.180, Free Thyroxine 1.16, Digoxin 

Level 0.1L


2/16/21 11:46: 


Immature Granulocyte % (Auto) 2.1, Neutrophils (%) (Auto) 70.2H, Lymphocytes (%)

(Auto) 14.6L, Monocytes (%) (Auto) 10.5H, Eosinophils (%) (Auto) 1.8, Basophils 

(%) (Auto) 0.8, Neutrophils # (Auto) 6.7, Lymphocytes # (Auto) 1.4L, Monocytes #

(Auto) 1.0H, Eosinophils # (Auto) 0.2, Basophils # (Auto) 0.1, Nucleated Red 

Blood Cells % (auto) 0.0, Procalcitonin <0.05


CBC/BMP


Laboratory Tests


2/16/21 11:45








2/16/21 11:46








Microbiology





Microbiology


2/16/21 Respiratory Virus Panel (PCR) ([facility]) - Final, Complete


          


2/16/21 Blood Culture, Received


          Pending


2/16/21 Blood Culture, Received


          Pending





Home Medications


Scheduled


Cetirizine HCl (Cetirizine HCl) 10 Mg Tablet, 10 MG PO QHS


Fluticasone Propionate (Flonase Allergy Relief) 9.9 Ml Dante.susp, 2 SPRAY NARES

DAILY


Metoprolol Succinate (Metoprolol Succinate) 25 Mg Tab.er.24h, 25 MG PO DAILY


Triamterene/Hydrochlorothiazid (Triamterene-Hctz 37.5-25 mg Cp) 1 Each Capsule, 

1 CAP PO DAILY





Scheduled PRN


Albuterol Sulf (Albuterol Sulfate) 2.5 Mg/3 Ml Vial.neb, 1 VIAL NEB Q4HP PRN for

wheezing


Albuterol Sulfate (Ventolin Hfa) 18 Gm Hfa.aer.ad, 2 PUFF INH Q4-6HP PRN for 

wheezing





Allergies


Coded Allergies:  


     aspirin (Verified  Adverse Reaction, Unknown, ulcers, 1/22/20)





A-FIB/CHADSVASC


A-FIB History


Current/History of A-Fib/PAF?:  No











GONDAL,KHUBAIB N. MD           Feb 16, 2021 17:10

## 2021-02-16 NOTE — CCD
Continuity of Care Document (CCD)

                             Created on: 2021



YokoMakeda

External Reference #: [id number]

: 1945

Sex: Female



Demographics





                          Address                   [address] [place]  [address]

 

                          Home Phone                [phone]

 

                          Preferred Language        Unknown

 

                          Marital Status            Unknown

 

                          Restoration Affiliation     Unknown

 

                          Race                      White

 

                          Ethnic Group              Not  or 





Author





                          Author                    Makeda PINO [facility]

 

                          Organization              Unknown

 

                          Address                   [address]

[place]  [address]



 

                          Phone                     [phone]







Care Team Providers





                    Care Team Member Name Role                Phone

 

                    [facility] - Orthopaedic Surgery [facility]  

              [phone]







Problems





                                        Description

 

                                        No Information Available







Social History





                Type            Date            Description     Comments

 

                Birth Sex                       Unknown          

 

                Tobacco Use     Start: Unknown  Never Used Smokeless Tobacco  

 

                ETOH Use                        Denies alcohol use  

 

                Tobacco Use     Start: Unknown End: Unknown Patient is a former 

smoker  

 

                Recreational Drug Use                 Never Used Drugs  

 

                Smoking Status  Reviewed: 20 Patient is a former smoker  

 

                Exercise Type/Frequency                 Exercises rarely  

 

                Tattoo/Piercing                 None             

 

                Sun Exposure                    Moderate amount of sun exposure 

 

 

                Seat Belt/Car Seat                 Always uses seat belt  

 

                Bike Helmet                     Never            

 

                Smoke Alarms                    Yes              

 

                Smoke Alarms                    Carbon Monoxide Detector: Yes  







Allergies, Adverse Reactions, Alerts





                                        Description

 

                                        No Known Drug Allergies







Medications





           Active Medications SIG        Qnty       Indications Ordering Provide

r Date

 

                                        Fluticasone Propionate                  

   50mcg/Act Suspension                 

             two sprays each side of nose daily 48gm         R09.81       Khadra Villalpando ch, FNP 2021

 

                          Cetirizine HCL                     10mg Tablets       

            1 by mouth QHS

                90tabs          R09.81          Khadra Pino FNP 2021

 

                                        Albuterol Sulfate                     (2

.5mg/3ML) 0.083% Nebulizer              

                                        1 unit dose ampule in nebulizer and inha

led up to 4 times a day for coughing

and wheezing    180ml                           Khadra Pino FNP 2021

 

                          Ventolin HFA                     108(90Base) mcg/Act A

erosol                   2

puffs every 4-6 hours as needed for wheezing                                 Unk

nown         2021

 

                          Pantoprazole Sodium                     40mg Tablets D

R                   take 

one tablet by mouth every day for heartburn/ acid reflux 90tabs                 

                 Khadra Pino FNP                              

 

                                        Triamterene/Hydrochlorothiazide         

            37.5-25mg Capsules          

                    take one capsule by mouth every morning for high blood press

ure 90caps               

                          Khadra Pino FNP      

 

                                        Metoprolol Succinate ER                 

    25mg Tablets ER 24HR                

             1 by mouth every day 90tabs                    Khadra Pino FNP

 

 

                                        History Medications

 

                          Dexamethasone                     2mg Tablets         

          one tablet by 

mouth once a day x 5 days                                 Unknown         2021 - 2021







Immunizations





             CPT Code     Status       Date         Vaccine      Lot #

 

                [id number]           Given           2020      Influenza Virus 

Vaccine, Quadrivalent,age 3 and 

up,multidose vial                       [id number]

 

             [id number]        Given        2020   Prevnar 13   [id number]







Vital Signs





                Date            Vital           Result          Comment

 

                2020  1:34pm BP Systolic     110 mmHg         

 

                    BP Diastolic        84 mmHg              

 

                    Heart Rate          93 /min              

 

                    Body Temperature    96.9 F             

 

                    Respiratory Rate    17 /min              

 

                    Height              62.0 inches         5'2"

 

                    Weight              204.25 lb            

 

                    O2 % BldC Oximetry  93 %                 

 

                    Peak Expiratory Flow Rate 305                 Estimated Peak

 Flow Rate

 

                    Ideal Body Weight   110 lb               

 

                    BMI (Body Mass Index) 37.4 kg/m2           

 

                2020  4:04pm BP Systolic     129 mmHg         

 

                    BP Diastolic        88 mmHg              

 

                    Heart Rate          90 /min              

 

                    Body Temperature    96.8 F             

 

                    Respiratory Rate    17 /min              

 

                    Height              62.0 inches         5'2"

 

                    Weight              204.50 lb            

 

                    O2 % BldC Oximetry  98 %                 

 

                    Peak Expiratory Flow Rate 305                 Estimated Peak

 Flow Rate

 

                    Ideal Body Weight   110 lb               

 

                    BMI (Body Mass Index) 37.4 kg/m2           







Results





                                        Description

 

                                        No Information Available







Procedures





                Date            Code            Description     Status

 

                2007      43064129        Colonoscopy     Completed







Medical Devices





                                        Description

 

                                        No Information Available







Encounters





           Type       Date       Location   Provider   Dx         Diagnosis

 

           Office Visit 2021  3:45p Main Office Khadra Pino FNP J12.8

2     Pneumonia

due to coronavirus disease 2019

 

           Office Visit 2021 12:00p Main Office Khadra Pino FNP R06.0

2     Shortness

of breath

 

           Office Visit 2020  1:30p Main Office Khadra Pino FNP I10  

      Essential 

(primary) hypertension

 

                          K21.9                     Gastro-esophageal reflux dis

ease without esophagitis

 

                          M79.671                   Pain in right foot

 

                          M25.561                   Pain in right knee

 

           Office Visit 2020  4:00p Main Office Khadra Pino FNP M79.6

71    Pain in 

right foot







Assessments





                Date            Code            Description     Provider

 

                2021      R09.81          Nasal congestion Khadra Pino FNP

 

                2021      J12.82          Pneumonia due to coronavirus dis

ease 2019 Khadra Pino FNP

 

                2021      R06.02          Shortness of breath Francis Pino, LISAP

 

                2020      I10             Essential (primary) hypertension

 Khadra Pino FNP

 

                2020      K21.9           Gastro-esophageal reflux disease

 without esophagitis Khadra Pino FNP

 

                2020      M79.671         Pain in right foot Pleskach, Mol

ly, FNP

 

                2020      M25.561         Pain in right knee Pleskach, Mol

ly, FNP

 

                2020      M79.671         Pain in right foot Pleskach, Mol

ly, FNP







Plan of Treatment

Future Appointment(s):* 2021  1:00 pm - Khadra Pino FNP at Main 
  Office

2021 - Khadra Pino FNP* R09.81 Nasal congestion* New Medication:* 
  Fluticasone Propionate 50 mcg/Act - two sprays each side of nose daily

* Cetirizine HCL 10 mg - 1 by mouth QHS









Functional Status





                Functional Condition Comment         Date            Status

 

                Glasses                                         Active

 

                Independent with all ADL's                                 Activ

e

 

                Independent with all IADL's                                 Acti

ve







Mental Status





                Mental Condition Comment         Date            Status

 

                None                                            Active







Referrals





                Refer to      Reason for Referral Status          Appt Date

 

                [facility] right foot pain  Closed         

 

 

                                        [address] [place] [address]

 

                                        [phone]

## 2021-02-16 NOTE — CCD
Continuity of Care Document (CCD)

                             Created on: 2021



ZbigniewgenaroMakeda

External Reference #: [id number]

: 1945

Sex: Female



Demographics





                          Address                   [address] [place]  [address]

 

                          Home Phone                [phone]

 

                          Preferred Language        Unknown

 

                          Marital Status            Unknown

 

                          Alevism Affiliation     Unknown

 

                          Race                      White

 

                          Ethnic Group              Not  or 





Author





                          Author                    Makeda PINO [facility]

 

                          Organization              Unknown

 

                          Address                   [address]

[place]  [address]



 

                          Phone                     [phone]







Care Team Providers





                    Care Team Member Name Role                Phone

 

                    [facility] - [facility]  

              [phone]







Problems





                    Active Problems     Provider            Date

 

                    Essential hypertension Khadra Pino FNP Onset: 

 

                    Gastroesophageal reflux disease Khadra Pino FNP Onset: 

2021







Social History





                Type            Date            Description     Comments

 

                Birth Sex                       Unknown          

 

                Tobacco Use     Start: Unknown  Never Used Smokeless Tobacco  

 

                ETOH Use                        Denies alcohol use  

 

                Tobacco Use     Start: Unknown End: Unknown Patient is a former 

smoker  

 

                Recreational Drug Use                 Never Used Drugs  

 

                Smoking Status  Reviewed: 20 Patient is a former smoker  

 

                Exercise Type/Frequency                 Exercises rarely  

 

                Tattoo/Piercing                 None             

 

                Sun Exposure                    Moderate amount of sun exposure 

 

 

                Seat Belt/Car Seat                 Always uses seat belt  

 

                Bike Helmet                     Never            

 

                Smoke Alarms                    Yes              

 

                Smoke Alarms                    Carbon Monoxide Detector: Yes  







Allergies, Adverse Reactions, Alerts





                                        Description

 

                                        No Known Drug Allergies







Medications





           Active Medications SIG        Qnty       Indications Ordering Provide

r Date

 

                                        Fluticasone Propionate                  

   50mcg/Act Suspension                 

             two sprays each side of nose daily 48gm         R09.81       Khadra Villalpando ch, FNP 2021

 

                          Cetirizine HCL                     10mg Tablets       

            1 by mouth QHS

                90tabs          R09.81          Khadra Pino FNP 2021

 

                                        Albuterol Sulfate                     (2

.5mg/3ML) 0.083% Nebulizer              

                                        1 unit dose ampule in nebulizer and inha

led up to 4 times a day for coughing

and wheezing    180ml                           Khadra Pino FNP 2021

 

                          Ventolin HFA                     108(90Base) mcg/Act A

erosol                   2

puffs every 4-6 hours as needed for wheezing                                 Unk

nown         2021

 

                          Pantoprazole Sodium                     40mg Tablets D

R                   take 

one tablet by mouth every day for heartburn/ acid reflux 90tabs                 

                 Khadra Pino FNP                              

 

                                        Triamterene/Hydrochlorothiazide         

            37.5-25mg Capsules          

                    take one capsule by mouth every morning for high blood press

ure 90caps               

                          Khadra Pino FNP      

 

                                        Metoprolol Succinate ER                 

    25mg Tablets ER 24HR                

             1 by mouth every day 90tabs                    Khadra Pino FNP

 

 

                                        History Medications

 

                          Dexamethasone                     2mg Tablets         

          one tablet by 

mouth once a day x 5 days                                 Unknown         2021 - 2021







Immunizations





             CPT Code     Status       Date         Vaccine      Lot #

 

                [id number]           Given           2020      Influenza Virus 

Vaccine, Quadrivalent,age 3 and 

up,multidose vial                       [id number]

 

             [id number]        Given        2020   Prevnar 13   [id number]







Vital Signs





                Date            Vital           Result          Comment

 

                2020  1:34pm BP Systolic     110 mmHg         

 

                    BP Diastolic        84 mmHg              

 

                    Heart Rate          93 /min              

 

                    Body Temperature    96.9 F             

 

                    Respiratory Rate    17 /min              

 

                    Height              62.0 inches         5'2"

 

                    Weight              204.25 lb            

 

                    O2 % BldC Oximetry  93 %                 

 

                    Peak Expiratory Flow Rate 305                 Estimated Peak

 Flow Rate

 

                    Ideal Body Weight   110 lb               

 

                    BMI (Body Mass Index) 37.4 kg/m2           

 

                2020  4:04pm BP Systolic     129 mmHg         

 

                    BP Diastolic        88 mmHg              

 

                    Heart Rate          90 /min              

 

                    Body Temperature    96.8 F             

 

                    Respiratory Rate    17 /min              

 

                    Height              62.0 inches         5'2"

 

                    Weight              204.50 lb            

 

                    O2 % BldC Oximetry  98 %                 

 

                    Peak Expiratory Flow Rate 305                 Estimated Peak

 Flow Rate

 

                    Ideal Body Weight   110 lb               

 

                    BMI (Body Mass Index) 37.4 kg/m2           







Results





                                        Description

 

                                        No Information Available







Procedures





                Date            Code            Description     Status

 

                2007      45926005        Colonoscopy     Completed







Medical Devices





                                        Description

 

                                        No Information Available







Encounters





           Type       Date       Location   Provider   Dx         Diagnosis

 

           Office Visit 2021 11:45a Main Office Khadra Pino FNP R09.8

1     Nasal 

congestion

 

           Office Visit 2021  3:45p Main Office Khadra Pino FNP J12.8

2     Pneumonia

due to coronavirus disease 2019

 

           Office Visit 2021 12:00p Main Office Khadra Pino FNP R06.0

2     Shortness

of breath

 

           Office Visit 2020  1:30p Main Office Khadra Pino FNP I10  

      Essential 

(primary) hypertension

 

                          K21.9                     Gastro-esophageal reflux dis

ease without esophagitis

 

                          M79.671                   Pain in right foot

 

                          M25.561                   Pain in right knee

 

           Office Visit 2020  4:00p Main Office Khadra Pino FNP M79.6

71    Pain in 

right foot







Assessments





                Date            Code            Description     Provider

 

                2021      R09.81          Nasal congestion Khadra Pino FNP

 

                2021      J12.82          Pneumonia due to coronavirus dis

ease 2019 Khadra Pino FNP

 

                2021      R06.02          Shortness of breath Francis Pino, FNP

 

                2020      I10             Essential (primary) hypertension

 Khadra Pino FNP

 

                2020      K21.9           Gastro-esophageal reflux disease

 without esophagitis Khadra Pino, BERT

 

                2020      M79.671         Pain in right foot Pleskach, Mol

ly, FNP

 

                2020      M25.561         Pain in right knee Pleskach, Mol

ly, FNP

 

                2020      M79.671         Pain in right foot Pleskach, Mol

ly, FNP







Plan of Treatment

Future Appointment(s):* 2021  1:00 pm - Khadra Pino FNP at Main 
  Office

2021 - Khadra Pino FNP* R09.81 Nasal congestion* New Medication:* 
  Fluticasone Propionate 50 mcg/Act - two sprays each side of nose daily

* Cetirizine HCL 10 mg - 1 by mouth QHS



* Comments:* advised to use steroid nasal spray and antihistamine.









Functional Status





                Functional Condition Comment         Date            Status

 

                Glasses                                         Active

 

                Independent with all ADL's                                 Activ

e

 

                Independent with all IADL's                                 Acti

ve







Mental Status





                Mental Condition Comment         Date            Status

 

                None                                            Active







Referrals





                Refer to      Reason for Referral Status          Appt Date

 

                [facility] right foot pain  Closed         

 

 

                                        [address] [place] [address]

 

                                        [phone]

## 2021-02-16 NOTE — CCD
Summarization Of Episode

                             Created on: 2021



PETAR SIMPSON

External Reference #: [id number]

: 1945

Sex: Female



Demographics





                          Address                   [address] [place]  [address]

 

                          Home Phone                [phone]

 

                          Preferred Language        English

 

                          Marital Status            

 

                          Sikh Affiliation     NONE

 

                          Race                      White

 

                          Ethnic Group              Not  or 





Author





                          Author                    [facility]

 

                          Organization              [facility]

 

                          Address                   Unknown

 

                          Phone                     Unavailable







Support





                Name            Relationship    Address         Phone

 

                RE              Next Of Kin     Unknown         Unavailable

 

                UE              Next Of Kin     Unknown         Unavailable

 

                    KYLEE DELACRUZ   Next Of Kin         [address] [place], [place]  [id number] [phone]







Care Team Providers





                    Care Team Member Name Role                Phone

 

                    Yudy Grimaldo MD Unavailable         Unavailable

 

                    Yudy Grimaldo MD Unavailable         Unavailable

 

                    Yudy Grimaldo MD Unavailable         Unavailable

 

                    Yudy Grimaldo MD Unavailable         Unavailable

 

                    Yudy Grimaldo MD Unavailable         Unavailable

 

                    Yudy Grimaldo MD Unavailable         Unavailable

 

                    Yudy Grimaldo MD Unavailable         Unavailable

 

                    Yudy Grimaldo MD Unavailable         Unavailable

 

                    Yudy Grimaldo MD Unavailable         Unavailable

 

                    Yudy Grimaldo MD Unavailable         Unavailable

 

                    Yudy Grimaldo MD Unavailable         Unavailable

 

                    Yudy Grimaldo MD Unavailable         Unavailable

 

                    Yudy Grimaldo MD Unavailable         Unavailable

 

                    Yudy Grimaldo MD Unavailable         Unavailable

 

                    Yudy Grimaldo MD Unavailable         Unavailable

 

                    Yudy Grimaldo MD Unavailable         Unavailable

 

                    Yudy Grimaldo MD Unavailable         Unavailable

 

                    Yudy Grimaldo MD Unavailable         Unavailable

 

                    Yudy Grimaldo MD Unavailable         Unavailable

 

                    Yudy Grimaldo MD Unavailable         Unavailable

 

                    Yudy Grimaldo MD Unavailable         Unavailable

 

                    Yudy Grimaldo MD Unavailable         Unavailable

 

                    Yudy Grimaldo MD Unavailable         Unavailable

 

                    Yudy Grimaldo MD Unavailable         Unavailable

 

                    Yudy Grimaldo MD Unavailable         Unavailable

 

                    Yudy Grimaldo MD Unavailable         Unavailable

 

                    Yudy Grimaldo MD Unavailable         Unavailable

 

                    Yudy Grimaldo MD Unavailable         Unavailable

 

                    Yudy Grimaldo MD Unavailable         Unavailable

 

                    Yudy Grimaldo MD Unavailable         Unavailable

 

                    Yudy Grimaldo MD Unavailable         Unavailable

 

                    Yudy Grimaldo MD Unavailable         Unavailable

 

                    Yudy Grimaldo MD Unavailable         Unavailable

 

                    Yudy Grimaldo MD Unavailable         Unavailable

 

                    Yudy Griamldo MD Unavailable         Unavailable

 

                    Yudy Grimaldo MD Unavailable         Unavailable

 

                    Yudy Grimaldo MD Unavailable         Unavailable

 

                    Nixon Grimaldotech MD Unavailable         Unavailable

 

                    Yudy Grimaldo MD Unavailable         Unavailable

 

                    Nixon Grimaldotech MD Unavailable         Unavailable

 

                    Yudy Grimaldo MD Unavailable         Unavailable

 

                    Nixon Grimald[employer] MD Unavailable         Unavailable

 

                    Nixon Grimald[employer] MD Unavailable         Unavailable

 

                    Meliton Grimaldojtech MD Unavailable         Unavailable

 

                    Nixon Grimaldotech MD Unavailable         Unavailable

 

                    Meliton Grimaldojtech MD Unavailable         Unavailable

 

                    Meliton Grimaldojtech MD Unavailable         Unavailable

 

                    Meliton Grimaldojtech MD Unavailable         Unavailable

 

                    Meliton Grimaldojtech MD Unavailable         Unavailable

 

                    Meliton Grimaldojtech MD Unavailable         Unavailable

 

                    Nixon Grimald[employer] MD Unavailable         Unavailable

 

                    Nixon Grimald[employer] MD Unavailable         Unavailable

 

                    Meliton Grimaldojtech MD Unavailable         Unavailable

 

                    Meliton Grimaldojtech MD Unavailable         Unavailable

 

                    Nixon Grimald[employer] MD Unavailable         Unavailable

 

                    Meliton Grimaldojtech MD Unavailable         Unavailable

 

                    Nixon Grimald[employer] MD Unavailable         Unavailable

 

                    Nixon Grimald[employer] MD Unavailable         Unavailable

 

                    Pleskach,  Khadra FNP Unavailable         Unavailable

 

                    Pleskach,  Khadra FNP Unavailable         Unavailable

 

                    Pleskach,  Khadra FNP Unavailable         Unavailable

 

                    Pleskach,  Khadra FNP Unavailable         Unavailable

 

                    Pleskach,  Khadra FNP Unavailable         Unavailable

 

                    Pleskach,  Khadra FNP Unavailable         Unavailable

 

                    Pleskach,  Khadra FNP Unavailable         Unavailable

 

                    Pleskach,  Khadra FNP Unavailable         Unavailable

 

                    Pleskach,  Khadra FNP Unavailable         Unavailable

 

                    Pleskach,  Khadra FNP Unavailable         Unavailable

 

                    Pleskach,  Khadra FNP Unavailable         Unavailable

 

                    Pleskach,  Khadra FNP Unavailable         Unavailable

 

                    Pleskach,  Khadra FNP Unavailable         Unavailable

 

                    Pleskach,  Khadra FNP Unavailable         Unavailable

 

                    Pleskach,  Khadra FNP Unavailable         Unavailable

 

                    Pleskach,  Khadra FNP Unavailable         Unavailable

 

                    Pleskach,  Khadra FNP Unavailable         Unavailable

 

                    Pleskach,  Khadra FNP Unavailable         Unavailable

 

                    Pleskach,  Khadra FNP Unavailable         Unavailable

 

                    Pleskach,  Khadra FNP Unavailable         Unavailable

 

                    Pleskach,  Khadra FNP Unavailable         Unavailable

 

                    Pleskach,  Khadra FNP Unavailable         Unavailable

 

                    Pleskach,  Khadra FNP Unavailable         Unavailable

 

                    Pleskach,  Khadra FNP Unavailable         Unavailable

 

                    Pleskach,  Khadra FNP Unavailable         Unavailable

 

                    Pleskach,  Khadra FNP Unavailable         Unavailable

 

                    Pleskach,  Khadra FNP Unavailable         Unavailable

 

                    Pleskach,  Khadra FNP Unavailable         Unavailable

 

                    Pleskach,  Khadra FNP Unavailable         Unavailable

 

                    Pleskach,  Khadra FNP Unavailable         Unavailable

 

                    VANEGAS,  GENTRY MD Unavailable         Unavailable

 

                    VANEGAS,  GENTRY MD Unavailable         Unavailable

 

                    VANEGAS,  GENTRY MD Unavailable         Unavailable

 

                    VANEGAS,  GENTRY MD Unavailable         Unavailable

 

                    VANEGAS,  GENTRY MD Unavailable         Unavailable

 

                    VANEGAS,  GENTRY MD Unavailable         Unavailable

 

                    VANEGAS,  GENTRY MD Unavailable         Unavailable

 

                    VANEGAS,  GENTRY MD Unavailable         Unavailable

 

                    VANEGAS,  GENTRY MD Unavailable         Unavailable

 

                    VANEGAS,  GENTRY MD Unavailable         Unavailable

 

                    VANEGAS,  GENTRY MD Unavailable         Unavailable

 

                    VANEGAS,  GENTRY MD Unavailable         Unavailable

 

                    VANEGAS,  GENTRY MD Unavailable         Unavailable

 

                    VANEGAS,  GENTRY MD Unavailable         Unavailable

 

                    VANEGAS,  GENTRY MD Unavailable         Unavailable

 

                    VANEGAS,  GENTRY MD Unavailable         Unavailable

 

                    VANEGAS,  GENTRY MD Unavailable         Unavailable

 

                    VANEGAS,  GENTRY MD Unavailable         Unavailable

 

                    VANEGAS,  GENTRY MD Unavailable         Unavailable

 

                    VANEGAS,  GENTRY MD Unavailable         Unavailable

 

                    VANEGAS,  GENTRY MD Unavailable         Unavailable

 

                    VANEGAS,  GENTRY MD Unavailable         Unavailable

 

                    VANEGAS,  GENTRY MD Unavailable         Unavailable

 

                    VANEGAS,  GENTRY MD Unavailable         Unavailable

 

                    VANEGAS,  GENTRY MD Unavailable         Unavailable

 

                    VANEGAS,  GENTRY MD Unavailable         Unavailable

 

                    VANEGAS,  GENTRY MD Unavailable         Unavailable

 

                    VANEGAS,  GENTRY MD Unavailable         Unavailable

 

                    VANEGAS,  GENTRY MD Unavailable         Unavailable

 

                    VANEGAS,  GENTRY MD Unavailable         Unavailable



                                  



Re-disclosure Warning

          The records that you are about to access may contain information from 
federally-assisted alcohol or drug abuse programs. If such information is 
present, then the following federally mandated warning applies: This information
has been disclosed to you from records protected by federal confidentiality 
rules (42 CFR part 2). The federal rules prohibit you from making any further 
disclosure of this information unless further disclosure is expressly permitted 
by the written consent of the person to whom it pertains or as otherwise 
permitted by 42 CFR part 2. A general authorization for the release of medical 
or other information is NOT sufficient for this purpose. The Federal rules 
restrict any use of the information to criminally investigate or prosecute any 
alcohol or drug abuse patient.The records that you are about to access may 
contain highly sensitive health information, the redisclosure of which is 
protected by Article 27-F of the [facility] Public Health law. If you 
continue you may have access to information: Regarding HIV / AIDS; Provided by 
facilities licensed or operated by the [facility] Office of Mental Health; 
or Provided by the [place] Office for People With Developmental 
Disabilities. If such information is present, then the following [place] 
mandated warning applies: This information has been disclosed to you from 
confidential records which are protected by state law. State law prohibits you 
from making any further disclosure of this information without the specific 
written consent of the person to whom it pertains, or as otherwise permitted by 
law. Any unauthorized further disclosure in violation of state law may result in
a fine or retirement sentence or both. A general authorization for the release of 
medical or other information is NOT sufficient authorization for further disc
losure.                                                                         
    



Encounters

          



           Encounter  Providers  Location   Date       Indications Data Source(s

)

 

             Outpatient   Attender: Khadra Tejada [facility]  2021 1

0:45:00 AM EST  

                                        [facility] ([facility]

 

             Outpatient   Attender: Khadra Tejada [facility]  2021 0

2:45:00 PM EST  

                                        [facility] ([facility]

 

             Outpatient   Attender: Khadra Tejada [facility]  2021 1

1:00:00 AM EST  

                                        [facility] ([facility]

 

                OFFICE OUTPATIENT NEW 30 MINUTES Attender: GENTRY VANEGAS MD Ph

ysical Therapy 

10/20/2020 02:45:00 PM EDT                           [facility] ([facility]

[facility]

 

             Outpatient   Attender: Khadra Tejada [facility]  2020 0

1:30:00 PM EDT  

                                        [facility] ([facility]

 

             Outpatient   Attender: Khadra Tejada [facility]  2020 0

4:00:00 PM EDT  

                                        [facility] ([facility]

 

                Outpatient      Referrer: Yudy PATTERSON.STEFANO 2020 12:00:00 AM 

EDT - 2020 11:05:10 AM EDT                           [facility]

 

                Outpatient      Attender: Yudy HORNESTEFANO  12:00:00 AM 

EDT - 07/15/2020 09:58:49 AM EDT                           [facility]

 

             Outpatient   Attender: Khadra Tejada [facility]  2020 1

0:30:00 AM EDT  

                                        [facility] ([facility]

 

                Outpatient      Referrer: Yudy LIMASTEFANO-SJP.STEFANO 2020 12:00:00 AM 

EDT - 2020 10:23:56 AM EDT                           [facility]

 

                Outpatient      Attender: Yudy BURGERSJMIRTA.STEFANO  12:00:00 AM 

EDT - 2020 09:33:07 AM EDT                           [facility]

 

             Outpatient   Attender: Khadra Tejada [facility]  2020 1

2:30:00 PM EST  

                                        [facility] ([facility]

 

           Outpatient                       2020 01:36:00 PM EST          

  [facility]



                                                                                
                                                                                
                                              



Immunizations

          



             Vaccine      Date         Status       Description  Data Source(s)

 

             Pneumococcal conjugate PCV 13 2020 01:02:00 PM EST completed 

                [facility] 

([facility]

 

             New in .  IIV4 2020 01:02:00 PM EST completed            

     [facility] ([facility]



                                                                                
                 



Medications

          



          Medication Brand Name Start Date Product Form Dose      Route     Admi

nistrative 

Instructions Pharmacy Instructions Status     Indications Reaction   Description

 Data 

Source(s)

 

                    cetirizine hydrochloride 10 MG Oral Tablet Cetirizine HCL   

   2021 12:00:00 AM

EST                  ORAL                 active                      [facility] ([facility]

 

          50 mcg/actuation           2021 12:00:00 AM EST spray,suspension

 48                  USE 2 SPRAYS

IN EACH NOSTRIL ONCE DAILY USE 2 SPRAYS IN EACH NOSTRIL ONCE DAILY SOLD: 

2021                                                      [facility]

 

          10 mg               2021 12:00:00 AM EST tablet    90           

       TAKE ONE TABLET BY MOUTH AT 

BEDTIME    TAKE ONE TABLET BY MOUTH AT BEDTIME SOLD: 2021                 

                 [facility]

 

          Fluticasone Propionate Fluticasone Propionate 2021 12:00:00 AM E

ST                                

                      active                                      [facility]

 

                    Albuterol 0.83 MG/ML Inhalant Solution Albuterol Sulfate   0

2021 12:00:00 AM 

EST                                       active                      [facility] ([facility]

 

             2.5 mg /3 mL (0.083 %)              2021 12:00:00 AM EST solu

tion for nebulization 

225                                                 INHALE THE CONTENTS OF 1 VIA

L VIA NEBULIZER UP TO 4 TIMES A DAY FOR 

COUGHING AND WHEEZING                   INHALE THE CONTENTS OF 1 VIAL VIA NEBULI

ZER UP TO 4 TIMES 

A DAY FOR COUGHING AND WHEEZING SOLD: 2021                                

        [facility]

 

           Dexamethasone 2 MG Oral Tablet Dexamethasone 2021 12:00:00 AM E

ST                       ORAL

                              completed                               [facility]

 

        2 mg            2021 12:00:00 AM EST tablet  5               TAKE 

ONE TABLET BY MOUTH EVERY DAY 

TAKE ONE TABLET BY MOUTH EVERY DAY SOLD: 2021                             

           [facility]

 

          90 mcg/actuation           2021 12:00:00 AM EST HFA aerosol inha

ler 18                  INHALE 2 

PUFFS BY MOUTH EVERY 4-6 HOURS AS NEEDED FOR WHEEZING INHALE 2 PUFFS BY MOUTH 

EVERY 4-6 HOURS AS NEEDED FOR WHEEZING SOLD: 2021                         

               [facility]

 

                          200 ACTUAT Albuterol 0.09 MG/ACTUAT Metered Dose Inhal

er [Ventolin] Ventolin HFA

      2021 12:00:00 AM EST             RESPIRATORY             active     

              [facility]

 

          37.5-25 mg           2021 12:00:00 AM EST capsule   90          

        TAKE ONE CAPSULE BY MOUTH 

EVERY MORNING FOR FOR HIGH BLOOD PRESSURE TAKE ONE CAPSULE BY MOUTH EVERY 

MORNING FOR FOR HIGH BLOOD PRESSURE SOLD: 2021                            

            [facility]

 

          25 mg               2021 12:00:00 AM EST tablet extended release

 24 hr 90                  TAKE ONE 

TABLET BY MOUTH EVERY DAY TAKE ONE TABLET BY MOUTH EVERY DAY SOLD: 2021   

              

                                                    [facility]

 

          37.5-25 mg           2020 12:00:00 AM EDT capsule   90          

        TAKE ONE CAPSULE BY MOUTH 

EVERY MORNING FOR HIGH BLOOD PRESSURE   TAKE ONE CAPSULE BY MOUTH EVERY MORNING 

FOR HIGH BLOOD PRESSURE SOLD: 2020                                        

[facility]

 

          40 mg               2020 12:00:00 AM EDT tablet,delayed release 

(DR/EC) 90                  TAKE ONE 

TABLET BY MOUTH EVERY DAY TAKE ONE TABLET BY MOUTH EVERY DAY SOLD: 2020   

              

                                                    [facility]

 

          25 mg               2020 12:00:00 AM EDT tablet extended release

 24 hr 90                  TAKE ONE 

TABLET BY MOUTH DAILY TAKE ONE TABLET BY MOUTH DAILY SOLD: 2020           

                       

[facility]

 

          25 mg               2020 12:00:00 AM EDT tablet extended release

 24 hr 90                  TAKE 1 

TABLET BY MOUTH ONCE DAILY TAKE 1 TABLET BY MOUTH ONCE DAILY SOLD: 2020   

              

                                                    [facility]

 

          37.5-25 mg           2020 12:00:00 AM EDT capsule   90          

        TAKE 1 CAPSULE BY MOUTH 

ONCE DAILY IN THE MORNING FOR HIGH BLOOD PRESSURE TAKE 1 CAPSULE BY MOUTH ONCE 

DAILY IN THE MORNING FOR HIGH BLOOD PRESSURE SOLD: 2020                   

                     [facility]

 

          37.5-25 mg           2020 12:00:00 AM EDT capsule   30          

        TAKE ONE CAPSULE BY MOUTH 

EVERY MORNING TAKE ONE CAPSULE BY MOUTH EVERY MORNING SOLD: 2020          

                        

[facility]

 

          25 mg               2020 12:00:00 AM EDT tablet extended release

 24 hr 30                  TAKE ONE 

TABLET BY MOUTH EVERY DAY TAKE ONE TABLET BY MOUTH EVERY DAY SOLD: 2020   

              

                                                    [facility]

 

          37.5-25 mg           2020 12:00:00 AM EDT capsule   30          

        TAKE ONE CAPSULE BY MOUTH 

EVERY MORNING TAKE ONE CAPSULE BY MOUTH EVERY MORNING SOLD: 2020          

                        

[facility]

 

          25 mg               2020 12:00:00 AM EDT tablet extended release

 24 hr 30                  TAKE ONE 

TABLET BY MOUTH EVERY DAY TAKE ONE TABLET BY MOUTH EVERY DAY SOLD: 2020   

              

                                                    [facility]

 

          37.5-25 mg           2020 12:00:00 AM EDT capsule   30          

        TAKE ONE CAPSULE BY MOUTH 

EVERY MORNING TAKE ONE CAPSULE BY MOUTH EVERY MORNING SOLD: 2020          

                        

[facility]

 

          25 mg               2020 12:00:00 AM EDT tablet extended release

 24 hr 30                  TAKE ONE 

TABLET BY MOUTH EVERY DAY TAKE ONE TABLET BY MOUTH EVERY DAY SOLD: 2020   

              

                                                    [facility]

 

                    pantoprazole 40 MG Delayed Release Oral Tablet PANTOPRAZOLE 

SODIUM 2020 

12:00:00 AM EST tablet,delayed release (DR/EC) 90                              T

KIRILL ONE TABLET BY MOUTH 

EVERY DAY FOR HEARTBURN/ ACID REFLUX    TAKE ONE TABLET BY MOUTH EVERY DAY FOR 

HEARTBURN/ ACID REFLUX SOLD: 2020                                        [facility]

 

          40 mg               2020 12:00:00 AM EST tablet,delayed release 

(DR/EC) 90                  TAKE ONE 

TABLET BY MOUTH EVERY DAY FOR HEARTBURN/ ACID REFLUX TAKE ONE TABLET BY MOUTH 

EVERY DAY FOR HEARTBURN/ ACID REFLUX SOLD: 2020                           

             [facility]

 

          40 mg               2020 12:00:00 AM EST tablet,delayed release 

(DR/EC) 30                  TAKE ONE 

TABLET BY MOUTH EVERY DAY AT 0600 TAKE ONE TABLET BY MOUTH EVERY DAY AT 0600 

SOLD: 2020                                                 [facility]

 

             2.5 mg /3 mL (0.083 %)              2020 12:00:00 AM EST solu

tion for nebulization 75

                                        1 VIAL VIA NEBULIZER THREE TIMES A DAY 1

 VIAL VIA NEBULIZER THREE TIMES A DAY

             SOLD: 2020                                        [facility]

s

 

          20 mg               2020 12:00:00 AM EST tablet    5            

       TAKE 1 TABLET BY MOUTH ONCE A DAY 

FOR 5 DAYS TAKE 1 TABLET BY MOUTH ONCE A DAY FOR 5 DAYS SOLD: 2020        

                          

[facility]

 

          250 mg              2020 12:00:00 AM EST tablet    6            

       TAKE TWO TABLETS BY MOUTH AT ONCE

 ON THE FIRST DAY THEN TAKE ONE DAILY THEREAFTER TAKE TWO TABLETS BY MOUTH AT 

ONCE ON THE FIRST DAY THEN TAKE ONE DAILY THEREAFTER SOLD: 2020           

                             

[facility]

 

        NEBULIZER         2020 12:00:00 AM EST misc    1               USE

 AS DIRECTED USE AS DIRECTED 

SOLD: 2020                                                 [facility]



                                                                                
                                                                                
                                                                                
                                                                                
                                      



Insurance Providers

          



             Payer name   Policy type / Coverage type Policy ID    Covered party

 ID Covered 

party's relationship to shi Policy Shi             Plan Information

 

          MEDICARE            [id number]           SP                  [id number]

X63

 

          MEDICARE            [id number]           Kate                 [id number]

X63

 

          MEDICARE  C         [id number]           S                   [id number]

X63

 

          MEDICARE            [id number]           SP                  [id number]



                                                                                
                                      



Problems, Conditions, and Diagnoses

          



           Code       Display Name Description Problem Type Effective Dates Data

 Source(s)

 

                063465560       Gastroesophageal reflux disease Gastroesophageal

 reflux disease 

Problem                   2021 12:00:00 AM EST [facility] ([facility]

 

             [id number]     Essential hypertension Essential hypertension Problem 

     2021 

12:00:00 AM EST                         [facility] ([facility]

 

             R06.09       Other forms of dyspnea Other forms of dyspnea Diagnosi

s    2020 

07:21:05 AM EDT                         [facility]

 

           R07.89     Other chest pain Other chest pain Diagnosis  2020 07

:21:05 AM EDT 

[facility]



                                                                                
                                                



Surgeries/Procedures

          



             Procedure    Description  Date         Indications  Data Source(s)

 

             RADEX FOOT COMPLETE MINIMUM 3 VIEWS              10/20/2020 12:00:0

0 AM EDT              [facility] ([facility])



                                                                                
        



Results

          



                    ID                  Date                Data Source

 

                    [id number]             2021 12:59:00 PM EST [facility]









          Name      Value     Range     Interpretation Code Description Data Bing

rce(s) Supporting 

Document(s)

 

                                        Respiratory pathogens identified [Type] 

in Nasopharynx by Probe and target 

amplification method SARS-CoV-2 (COVID 19)                                  [facility]      

 

                                        This lab was ordered by [facility] a

nd reported by [facility].

 









                    ID                  Date                Data Source

 

                    [id number]            2020 08:17:00 AM EST [facility] ([facility]









          Name      Value     Range     Interpretation Code Description Data Bing

rce(s) Supporting 

Document(s)

 

           Estimated Average Glucose 134 mg/dL                            

 [facility]                                    

 

          Hemoglobin A1c 6.3 %                                   [facility] ([facility]  

 

                                        REFERENCE RANGES:



                                        4.5-5.6%  NORMAL

                                        5.7-6.4%  SUGGESTS IMPAIRED GLUCOSE META

BOLISM

>= 6.5%  ABNORMAL

 









                    ID                  Date                Data Source

 

                    [id number]            2020 08:17:00 AM EST [facility] ([facility]









          Name      Value     Range     Interpretation Code Description Data Bing

rce(s) Supporting 

Document(s)

 

           Calcitriol [Mass/volume] in Serum or Plasma 37.9 pg/mL 19.9-79.3     

                   [facility] 

([facility]          

 

                                        Performed at:  [address]  [id number]

61

: Berny Paulino MD, Phone:  [phone]

 









                    ID                  Date                Data Source

 

                    [id number]            2020 08:17:00 AM EST [facility] ([facility]









          Name      Value     Range     Interpretation Code Description Data Bing

rce(s) Supporting 

Document(s)

 

          Triglycerides Level 116 mg/dL                               [facility] ([facility]  

 

          Cholesterol Level 143 mg/dL                               [facility] ([facility]  

 

          LDL Cholesterol 73 mg/dL                                [facility] ([facility]  

 

          HDL Cholesterol 47 mg/dL                                [facility]  

 

          Cholesterol Risk Ratio 3.042                                   [facility] 

([facility]  

 

          Non-HDL-C 96 mg/dL                                [facility]  







                                        Procedure

 

                                          



                                                                                
                                                



Social History

          



           Code       Duration   Value      Status     Description Data Source(s

)

 

             Smoking      2020 12:00:00 AM EDT Patient is a former smoker 

completed    Patient 

is a former smoker                      [facility] ([facility]



                                                                                
                  



Vital Signs

          



                    ID                  Date                Data Source

 

                    UNK                                      









           Name       Value      Range      Interpretation Code Description Data

 Source(s)

 

           Body mass index (BMI) [Ratio] 37.2 kg/m2                       37.2 k

g/m2 [facility] ([facility]

 

           Body weight 200.38 [lb_av]                       200.38 [lb_av] [facility]

T ([facility] 

)

 

           Body height 61.5 [in_i]                       61.5 [in_i] [facility] ([facility]

 

                                        5'1.50" 

 

           Body temperature 96.4 [degF]                       96.4 [degF] [facility]

 ([facility] 

)

 

           Body mass index (BMI) [Ratio] 37.4 kg/m2                       37.4 k

g/m2 [facility] ([facility]

 

           Ideal body weight 110 [lb_av]                       110 [lb_av] [facility]

T ([facility]

 

           Oxygen saturation in Arterial blood by Pulse oximetry 93 %           

                  93 %       [facility] 

([facility]

 

           Body weight 204.25 [lb_av]                       204.25 [lb_av] [facility]

T ([facility]

 

           Body height 62.0 [in_i]                       62.0 [in_i] [facility] ([facility]

 

                                        5'2" 

 

           Respiratory rate 17 /min                          17 /min    [facility] (

[facility]

 

           Body temperature 96.9 [degF]                       96.9 [degF] [facility]

 ([facility]

 

           Heart rate 93 /min                          93 /min    [facility] ([facility]

 

           Diastolic blood pressure 84 mm[Hg]                        84 mm[Hg]  

[facility] ([facility]

 

           Systolic blood pressure 110 mm[Hg]                       110 mm[Hg] M

[facility] ([facility]

 

           Body mass index (BMI) [Ratio] 37.4 kg/m2                       37.4 k

g/m2 [facility] ([facility]

 

           Ideal body weight 110 [lb_av]                       110 [lb_av] [facility]

T ([facility]

 

           Oxygen saturation in Arterial blood by Pulse oximetry 98 %           

                  98 %       [facility] 

([facility]

 

           Body weight 204.50 [lb_av]                       204.50 [lb_av] [facility]

T ([facility]

 

           Body height 62.0 [in_i]                       62.0 [in_i] [facility] ([facility]

 

                                        5'2" 

 

           Respiratory rate 17 /min                          17 /min    [facility] (

[facility]

 

           Body temperature 96.8 [degF]                       96.8 [degF] [facility]

 ([facility]

 

           Heart rate 90 /min                          90 /min    [facility]

 

           Diastolic blood pressure 88 mm[Hg]                        88 mm[Hg]  

[facility] ([facility]

 

           Systolic blood pressure 129 mm[Hg]                       129 mm[Hg] M

[facility] ([facility]

 

           Body mass index (BMI) [Ratio] 38.3 kg/m2                       38.3 k

g/m2 [facility] ([facility]

 

           Oxygen saturation in Arterial blood by Pulse oximetry 93 %           

                  93 %       [facility] 

([facility]

 

           Body weight 209.38 [lb_av]                       209.38 [lb_av] [facility]

T ([facility]

 

           Body height 62.0 [in_i]                       62.0 [in_i] [facility] ([facility]

 

                                        5'2" 

 

           Respiratory rate 20 /min                          20 /min    [facility] (

[facility]

 

           Body temperature 98.1 [degF]                       98.1 [degF] [facility]

 ([facility]

 

           Heart rate 80 /min                          80 /min    [facility] ([facility]

 

           Diastolic blood pressure 93 mm[Hg]                        93 mm[Hg]  

[facility] ([facility]

 

           Systolic blood pressure 130 mm[Hg]                       130 mm[Hg] M

[facility] ([facility]

 

           Diastolic blood pressure 92 mm[Hg]                        92 mm[Hg]  

[facility] ([facility]

 

           Systolic blood pressure 145 mm[Hg]                       145 mm[Hg] M

[facility] ([facility]

 

           Body mass index (BMI) [Ratio] 38.5 kg/m2                       38.5 k

g/m2 [facility] ([facility]

 

           Oxygen saturation in Arterial blood by Pulse oximetry 92 %           

                  92 %       [facility] 

([facility]

 

           Body weight 210.38 [lb_av]                       210.38 [lb_av] [facility]

T ([facility]

 

           Body height 62.0 [in_i]                       62.0 [in_i] [facility] ([facility]

 

                                        5'2" 

 

           Respiratory rate 18 /min                          18 /min    [facility] (

[facility]

 

           Body temperature 99.4 [degF]                       99.4 [degF] [facility]

 ([facility]

 

           Heart rate 88 /min                          88 /min    [facility]

 

           Diastolic blood pressure 72 mm[Hg]                        72 mm[Hg]  

[facility] ([facility]

 

           Systolic blood pressure 96 mm[Hg]                        96 mm[Hg]  M

[facility]

## 2021-02-16 NOTE — CCD
Continuity of Care Document (CCD)

                             Created on: 2021



[employer]

External Reference #: [id number]

: 1945

Sex: Female



Demographics





                          Address                   [address] [place]  [address]

 

                          Home Phone                [phone]

 

                          Preferred Language        Unknown

 

                          Marital Status            Unknown

 

                          Druze Affiliation     Unknown

 

                          Race                      White

 

                          Ethnic Group              Not  or 





Author





                          Author                    Makeda PINO [facility]

 

                          Organization              Unknown

 

                          Address                   [address]

[place]  [address]



 

                          Phone                     [phone]







Care Team Providers





                    Care Team Member Name Role                Phone

 

                    [facility] - Orthopaedic Surgery AUT[facility]  

              [phone]







Problems





                                        Description

 

                                        No Information Available







Social History





                Type            Date            Description     Comments

 

                Birth Sex                       Unknown          

 

                Tobacco Use     Start: Unknown  Never Used Smokeless Tobacco  

 

                ETOH Use                        Denies alcohol use  

 

                Tobacco Use     Start: Unknown End: Unknown Patient is a former 

smoker  

 

                Recreational Drug Use                 Never Used Drugs  

 

                Smoking Status  Reviewed: 20 Patient is a former smoker  

 

                Exercise Type/Frequency                 Exercises rarely  

 

                Tattoo/Piercing                 None             

 

                Sun Exposure                    Moderate amount of sun exposure 

 

 

                Seat Belt/Car Seat                 Always uses seat belt  

 

                Bike Helmet                     Never            

 

                Smoke Alarms                    Yes              

 

                Smoke Alarms                    Carbon Monoxide Detector: Yes  







Allergies, Adverse Reactions, Alerts





                                        Description

 

                                        No Known Drug Allergies







Medications





           Active Medications SIG        Qnty       Indications Ordering Provide

r Date

 

                          Pantoprazole Sodium                     40mg Tablets D

R                   take 

one tablet by mouth every day for heartburn/ acid reflux 90tabs                 

                 Khadra Pino FNP                              

 

                                        Triamterene/Hydrochlorothiazide         

            37.5-25mg Capsules          

                    take one capsule by mouth every morning for high blood press

ure 90caps               

                          Khadra Pino FNP      

 

                                        Metoprolol Succinate ER                 

    25mg Tablets ER 24HR                

             1 by mouth every day 90tabs                    Khadra Pino FNP

 







Immunizations





             CPT Code     Status       Date         Vaccine      Lot #

 

                [id number]           Given           2020      Influenza Virus 

Vaccine, Quadrivalent,age 3 and 

up,multidose vial                       [id number]

 

             [id number]        Given        2020   Prevnar 13   [id number]







Vital Signs





                Date            Vital           Result          Comment

 

                2020  1:34pm BP Systolic     110 mmHg         

 

                    BP Diastolic        84 mmHg              

 

                    Heart Rate          93 /min              

 

                    Body Temperature    96.9 F             

 

                    Respiratory Rate    17 /min              

 

                    Height              62.0 inches         5'2"

 

                    Weight              204.25 lb            

 

                    O2 % BldC Oximetry  93 %                 

 

                    Peak Expiratory Flow Rate 305                 Estimated Peak

 Flow Rate

 

                    Ideal Body Weight   110 lb               

 

                    BMI (Body Mass Index) 37.4 kg/m2           

 

                2020  4:04pm BP Systolic     129 mmHg         

 

                    BP Diastolic        88 mmHg              

 

                    Heart Rate          90 /min              

 

                    Body Temperature    96.8 F             

 

                    Respiratory Rate    17 /min              

 

                    Height              62.0 inches         5'2"

 

                    Weight              204.50 lb            

 

                    O2 % BldC Oximetry  98 %                 

 

                    Peak Expiratory Flow Rate 305                 Estimated Peak

 Flow Rate

 

                    Ideal Body Weight   110 lb               

 

                    BMI (Body Mass Index) 37.4 kg/m2           







Results





                                        Description

 

                                        No Information Available







Procedures





                Date            Code            Description     Status

 

                2007      [id number]        Colonoscopy     Completed







[employer]





                                        Description

 

                                        No Information Available







Encounters





           Type       Date       Location   Provider   Dx         Diagnosis

 

           Office Visit 2021 12:00p Main Office Khadra Pino FNP R06.0

2     Shortness

of breath

 

           Office Visit 2020  1:30p Main Office Khadra Pino FNP I10  

      Essential 

(primary) hypertension

 

                          K21.9                     Gastro-esophageal reflux dis

ease without esophagitis

 

                          M79.671                   Pain in right foot

 

                          M25.561                   Pain in right knee

 

           Office Visit 2020  4:00p Main Office Khadra Pino FNP M79.6

71    Pain in 

right foot







Assessments





                Date            Code            Description     Provider

 

                2021      R06.02          Shortness of breath Francis Pino, FNP

 

                2020      I10             Essential (primary) hypertension

 Khadra Pino, FNP

 

                2020      K21.9           Gastro-esophageal reflux disease

 without esophagitis Khadra Pino, FNP

 

                2020      M79.671         Pain in right foot Pleskach, Mol

ly, FNP

 

                2020      M25.561         Pain in right knee Pleskach, Mol

ly, FNP

 

                2020      M79.671         Pain in right foot Pleskach, Mol

ly, FNP







Plan of Treatment

Future Appointment(s):* 2021  1:00 pm - Khadra Pino FNP at Main 
  Office

2021 - Khadra Pino FNP* R06.02 Shortness of breath* Comments:* 
  patient has significant SOB with positive COVID exposure also in the presence 
  of untreated HTN.  Patient instructed to go to the ER immediately, I 
  recommended calling EMS however she and her son refuse, her son states he will
  take her.  I expressed the urgency of going now and not waiting.  Patient 
  verbalizes understanding.









Functional Status





                Functional Condition Comment         Date            Status

 

                Glasses                                         Active

 

                Independent with all ADL's                                 Activ

e

 

                Independent with all IADL's                                 Acti

ve







Mental Status





                Mental Condition Comment         Date            Status

 

                None                                            Active







Referrals





                Refer to      Reason for Referral Status          Appt Date

 

                [facility] right foot pain  Closed         

 

 

                                        [address] [place] [address]

 

                                        [phone]

## 2021-02-16 NOTE — CCD
Continuity of Care Document (CCD)

                             Created on: 2021



[employer]

External Reference #: [id number]

: 1945

Sex: Female



Demographics





                          Address                   [address] [place]  [address]

 

                          Home Phone                [phone]

 

                          Preferred Language        Unknown

 

                          Marital Status            Unknown

 

                          Nondenominational Affiliation     Unknown

 

                          Race                      White

 

                          Ethnic Group              Not  or 





Author





                          Author                    Makeda PINO [facility]

 

                          Organization              Unknown

 

                          Address                   [address]  [address]

[place]  [address]



 

                          Phone                     [phone]







Care Team Providers





                    Care Team Member Name Role                Phone

 

                    [facility] - Orthopaedic Surgery [facility]  

              [phone]







Problems





                                        Description

 

                                        No Information Available







Social History





                Type            Date            Description     Comments

 

                Birth Sex                       Unknown          

 

                Tobacco Use     Start: Unknown  Never Used Smokeless Tobacco  

 

                ETOH Use                        Denies alcohol use  

 

                Tobacco Use     Start: Unknown End: Unknown Patient is a former 

smoker  

 

                Recreational Drug Use                 Never Used Drugs  

 

                Smoking Status  Reviewed: 20 Patient is a former smoker  

 

                Exercise Type/Frequency                 Exercises rarely  

 

                Tattoo/Piercing                 None             

 

                Sun Exposure                    Moderate amount of sun exposure 

 

 

                Seat Belt/Car Seat                 Always uses seat belt  

 

                Bike Helmet                     Never            

 

                Smoke Alarms                    Yes              

 

                Smoke Alarms                    Carbon Monoxide Detector: Yes  







Allergies, Adverse Reactions, Alerts





                                        Description

 

                                        No Known Drug Allergies







Medications





           Active Medications SIG        Qnty       Indications Ordering Provide

r Date

 

                          Pantoprazole Sodium                     40mg Tablets D

R                   take 

one tablet by mouth every day for heartburn/ acid reflux 90tabs                 

                 Khadra Pino FNP                              

 

                                        Triamterene/Hydrochlorothiazide         

            37.5-25mg Capsules          

                    take one capsule by mouth every morning for high blood press

ure 90caps               

                          Khadra Pino FNP      

 

                                        Metoprolol Succinate ER                 

    25mg Tablets ER 24HR                

             1 by mouth every day 90tabs                    Khadra Pino FNP

 







Immunizations





             CPT Code     Status       Date         Vaccine      Lot #

 

                [id number]           Given           2020      Influenza Virus 

Vaccine, Quadrivalent,age 3 and 

up,multidose vial                       [id number]

 

             [id number]        Given        2020   Prevnar 13   [id number]







Vital Signs





                Date            Vital           Result          Comment

 

                2020  1:34pm BP Systolic     110 mmHg         

 

                    BP Diastolic        84 mmHg              

 

                    Heart Rate          93 /min              

 

                    Body Temperature    96.9 F             

 

                    Respiratory Rate    17 /min              

 

                    Height              62.0 inches         5'2"

 

                    Weight              204.25 lb            

 

                    O2 % BldC Oximetry  93 %                 

 

                    Peak Expiratory Flow Rate 305                 Estimated Peak

 Flow Rate

 

                    Ideal Body Weight   110 lb               

 

                    BMI (Body Mass Index) 37.4 kg/m2           

 

                2020  4:04pm BP Systolic     129 mmHg         

 

                    BP Diastolic        88 mmHg              

 

                    Heart Rate          90 /min              

 

                    Body Temperature    96.8 F             

 

                    Respiratory Rate    17 /min              

 

                    Height              62.0 inches         5'2"

 

                    Weight              204.50 lb            

 

                    O2 % BldC Oximetry  98 %                 

 

                    Peak Expiratory Flow Rate 305                 Estimated Peak

 Flow Rate

 

                    Ideal Body Weight   110 lb               

 

                    BMI (Body Mass Index) 37.4 kg/m2           







Results





                                        Description

 

                                        No Information Available







Procedures





                Date            Code            Description     Status

 

                2007      [id number]        Colonoscopy     Completed







Medical Devices





                                        Description

 

                                        No Information Available







Encounters





           Type       Date       Location   Provider   Dx         Diagnosis

 

           Office Visit 2020  1:30p Main Office PleLeidy zeey, FNP I10  

      Essential 

(primary) hypertension

 

                          K21.9                     Gastro-esophageal reflux dis

ease without esophagitis

 

                          M79.671                   Pain in right foot

 

                          M25.561                   Pain in right knee

 

           Office Visit 2020  4:00p Main Office Pleskach, Khadra, FNP M79.6

72    Pain in 

right foot







Assessments





                Date            Code            Description     Provider

 

                2020      I10             Essential (primary) hypertension

 Pleskach, Khadra, FNP

 

                2020      K21.9           Gastro-esophageal reflux disease

 without esophagitis Pleskach, 

Khadra, FNP

 

                2020      M79.671         Pain in right foot Pleskach, Mol

ly, FNP

 

                2020      M25.561         Pain in right knee Pleskach, Mol

ly, FNP

 

                2020      M79.671         Pain in right foot Pleskach, Mol

ly, FNP







Plan of Treatment

Future Appointment(s):* 2021  1:00 pm - Khadra Pino FNP at Main 
  Office





Functional Status





                Functional Condition Comment         Date            Status

 

                Glasses                                         Active

 

                Independent with all ADL's                                 Activ

e

 

                Independent with all IADL's                                 Acti

ve







Mental Status





                Mental Condition Comment         Date            Status

 

                None                                            Active







Referrals





                Refer to      Reason for Referral Status          Appt Date

 

                [facility] right foot pain  Closed         

 

 

                                        [address] [id number] [phone]

## 2021-02-16 NOTE — CCD
Summarization Of Episode

                             Created on: 2021



PETAR SIMPSON

External Reference #: [id number]

: 1945

Sex: Female



Demographics





                          Address                   [address] [place]  [address]

 

                          Home Phone                [phone]

 

                          Preferred Language        English

 

                          Marital Status            

 

                          Jehovah's witness Affiliation     NONE

 

                          Race                      White

 

                          Ethnic Group              Not  or 





Author





                          Author                    [facility]

 

                          Organization              [facility]

 

                          Address                   Unknown

 

                          Phone                     Unavailable







Support





                Name            Relationship    Address         Phone

 

                RE              Next Of Kin     Unknown         Unavailable

 

                UE              Next Of Kin     Unknown         Unavailable

 

                    KYLEE DELACRUZ   Next Of Kin         [address] [place], [place]  [id number] [phone]







Care Team Providers





                    Care Team Member Name Role                Phone

 

                    Yudy Grimaldo MD Unavailable         Unavailable

 

                    Yudy Grimaldo MD Unavailable         Unavailable

 

                    Yudy Grimaldo MD Unavailable         Unavailable

 

                    Yudy Grimaldo MD Unavailable         Unavailable

 

                    Yudy Grimaldo MD Unavailable         Unavailable

 

                    Yudy Grimaldo MD Unavailable         Unavailable

 

                    Yudy Grimaldo MD Unavailable         Unavailable

 

                    Yudy Grimaldo MD Unavailable         Unavailable

 

                    Yudy Grimaldo MD Unavailable         Unavailable

 

                    Yudy Grimaldo MD Unavailable         Unavailable

 

                    Yudy Grimaldo MD Unavailable         Unavailable

 

                    Yudy Grimaldo MD Unavailable         Unavailable

 

                    Yudy Grimaldo MD Unavailable         Unavailable

 

                    Yudy Grimaldo MD Unavailable         Unavailable

 

                    Yudy Grimaldo MD Unavailable         Unavailable

 

                    Yudy Grimaldo MD Unavailable         Unavailable

 

                    Yudy Grimaldo MD Unavailable         Unavailable

 

                    Yudy Grimaldo MD Unavailable         Unavailable

 

                    Yudy Grimaldo MD Unavailable         Unavailable

 

                    Yudy Grimaldo MD Unavailable         Unavailable

 

                    Yudy Grimaldo MD Unavailable         Unavailable

 

                    Yudy Grimaldo MD Unavailable         Unavailable

 

                    Yudy Grimaldo MD Unavailable         Unavailable

 

                    Yudy Grimaldo MD Unavailable         Unavailable

 

                    Yudy Grimaldo MD Unavailable         Unavailable

 

                    Yudy Grimaldo MD Unavailable         Unavailable

 

                    Yudy Grimaldo MD Unavailable         Unavailable

 

                    Yudy Grimaldo MD Unavailable         Unavailable

 

                    Yudy Grimaldo MD Unavailable         Unavailable

 

                    Yudy Grimaldo MD Unavailable         Unavailable

 

                    Yudy Grimaldo MD Unavailable         Unavailable

 

                    Yudy Grimaldo MD Unavailable         Unavailable

 

                    Yudy Grimaldo MD Unavailable         Unavailable

 

                    Yudy Grimaldo MD Unavailable         Unavailable

 

                    Yudy Grimaldo MD Unavailable         Unavailable

 

                    Yudy Grimaldo MD Unavailable         Unavailable

 

                    Yudy Grimaldo MD Unavailable         Unavailable

 

                    Nixon Grimaldotech MD Unavailable         Unavailable

 

                    Yudy Grimaldo MD Unavailable         Unavailable

 

                    Nixon Grimald[employer] MD Unavailable         Unavailable

 

                    Yudy Grimaldo MD Unavailable         Unavailable

 

                    Nixon Grimald[employer] MD Unavailable         Unavailable

 

                    Nixon Grimald[employer] MD Unavailable         Unavailable

 

                    Meliton Grimaldojtech MD Unavailable         Unavailable

 

                    Nixon Grimald[employer] MD Unavailable         Unavailable

 

                    Meliton Grimaldojtech MD Unavailable         Unavailable

 

                    Meliton Grimaldojtech MD Unavailable         Unavailable

 

                    Meliton Grimaldojtech MD Unavailable         Unavailable

 

                    Meliton Grimaldojtech MD Unavailable         Unavailable

 

                    Meliton Grimaldojtech MD Unavailable         Unavailable

 

                    Nixon Grimald[employer] MD Unavailable         Unavailable

 

                    Nixon Grimald[employer] MD Unavailable         Unavailable

 

                    Meliton Grimaldojtech MD Unavailable         Unavailable

 

                    Meliton Grimaldojtech MD Unavailable         Unavailable

 

                    Nixon Gr[employer] MD Unavailable         Unavailable

 

                    Meliton Grimaldojtech MD Unavailable         Unavailable

 

                    Nixon Gr[employer] MD Unavailable         Unavailable

 

                    Nixon Grimald[employer] MD Unavailable         Unavailable

 

                    Pleskach,  Khadra FNP Unavailable         Unavailable

 

                    Pleskach,  Khadra FNP Unavailable         Unavailable

 

                    Pleskach,  Khadra FNP Unavailable         Unavailable

 

                    Pleskach,  Khadra FNP Unavailable         Unavailable

 

                    Pleskach,  Khadra FNP Unavailable         Unavailable

 

                    Pleskach,  Khadra FNP Unavailable         Unavailable

 

                    Pleskach,  Khadra FNP Unavailable         Unavailable

 

                    Pleskach,  Khadra FNP Unavailable         Unavailable

 

                    Pleskach,  Khadra FNP Unavailable         Unavailable

 

                    Pleskach,  Khadra FNP Unavailable         Unavailable

 

                    Pleskach,  Khadra FNP Unavailable         Unavailable

 

                    Pleskach,  Khadra FNP Unavailable         Unavailable

 

                    Pleskach,  Khadra FNP Unavailable         Unavailable

 

                    Pleskach,  Khadra FNP Unavailable         Unavailable

 

                    Pleskach,  Khadra FNP Unavailable         Unavailable

 

                    Pleskach,  Khadra FNP Unavailable         Unavailable

 

                    Pleskach,  Khadra FNP Unavailable         Unavailable

 

                    Pleskach,  Khadra FNP Unavailable         Unavailable

 

                    Pleskach,  Khadra FNP Unavailable         Unavailable

 

                    Pleskach,  Khadra FNP Unavailable         Unavailable

 

                    Pleskach,  Khadra FNP Unavailable         Unavailable

 

                    Pleskach,  Khadra FNP Unavailable         Unavailable

 

                    Pleskach,  Khadra FNP Unavailable         Unavailable

 

                    Pleskach,  Khadra FNP Unavailable         Unavailable

 

                    Pleskach,  Khadra FNP Unavailable         Unavailable

 

                    Pleskach,  Khadra FNP Unavailable         Unavailable

 

                    Pleskach,  Khadra FNP Unavailable         Unavailable

 

                    Pleskach,  Khadra FNP Unavailable         Unavailable

 

                    Pleskach,  Khadra FNP Unavailable         Unavailable

 

                    Pleskach,  Khadra FNP Unavailable         Unavailable

 

                    VANEGAS,  GENTRY MD Unavailable         Unavailable

 

                    VANEGAS,  GENTRY MD Unavailable         Unavailable

 

                    VANEGAS,  GENTRY MD Unavailable         Unavailable

 

                    VANEGAS,  GENTRY MD Unavailable         Unavailable

 

                    VANEGAS,  GENTRY MD Unavailable         Unavailable

 

                    VANEGAS,  GENTRY MD Unavailable         Unavailable

 

                    VANEGAS,  GENTRY MD Unavailable         Unavailable

 

                    VANEGAS,  GENTRY MD Unavailable         Unavailable

 

                    VANEGAS,  GENTRY MD Unavailable         Unavailable

 

                    VANEGAS,  GENTRY MD Unavailable         Unavailable

 

                    VANEGAS,  GENTRY MD Unavailable         Unavailable

 

                    VANEGAS,  GENTRY MD Unavailable         Unavailable

 

                    VANEGAS,  GENTRY MD Unavailable         Unavailable

 

                    VANEGAS,  GENTRY MD Unavailable         Unavailable

 

                    VANEGAS,  GENTRY MD Unavailable         Unavailable

 

                    VANEGAS,  GENTRY MD Unavailable         Unavailable

 

                    VANEGAS,  GENTRY MD Unavailable         Unavailable

 

                    VANEGAS,  GENTRY MD Unavailable         Unavailable

 

                    VANEGAS,  GENTRY MD Unavailable         Unavailable

 

                    VANEGAS,  GENTRY MD Unavailable         Unavailable

 

                    VANEGAS,  GENTRY MD Unavailable         Unavailable

 

                    VANEGAS,  GENTRY MD Unavailable         Unavailable

 

                    VANEGAS,  GENTRY MD Unavailable         Unavailable

 

                    VANEGAS,  GENTRY MD Unavailable         Unavailable

 

                    VANEGAS,  GENTRY MD Unavailable         Unavailable

 

                    VANEGAS,  GENTRY MD Unavailable         Unavailable

 

                    VANEGAS,  GENTRY MD Unavailable         Unavailable

 

                    VANEGAS,  GENTRY MD Unavailable         Unavailable

 

                    VANEGAS,  GENTRY MD Unavailable         Unavailable

 

                    VANEGAS,  GENTRY MD Unavailable         Unavailable



                                  



Re-disclosure Warning

          The records that you are about to access may contain information from 
federally-assisted alcohol or drug abuse programs. If such information is 
present, then the following federally mandated warning applies: This information
has been disclosed to you from records protected by federal confidentiality 
rules (42 CFR part 2). The federal rules prohibit you from making any further 
disclosure of this information unless further disclosure is expressly permitted 
by the written consent of the person to whom it pertains or as otherwise 
permitted by 42 CFR part 2. A general authorization for the release of medical 
or other information is NOT sufficient for this purpose. The Federal rules 
restrict any use of the information to criminally investigate or prosecute any 
alcohol or drug abuse patient.The records that you are about to access may 
contain highly sensitive health information, the redisclosure of which is 
protected by Article 27-F of the [facility] Public Health law. If you 
continue you may have access to information: Regarding HIV / AIDS; Provided by 
facilities licensed or operated by the [facility] Office of Mental Health; 
or Provided by the [place] Office for People With Developmental 
Disabilities. If such information is present, then the following [place] 
mandated warning applies: This information has been disclosed to you from 
confidential records which are protected by state law. State law prohibits you 
from making any further disclosure of this information without the specific 
written consent of the person to whom it pertains, or as otherwise permitted by 
law. Any unauthorized further disclosure in violation of state law may result in
a fine or MCC sentence or both. A general authorization for the release of 
medical or other information is NOT sufficient authorization for further disc
losure.                                                                         
    



Encounters

          



           Encounter  Providers  Location   Date       Indications Data Source(s

)

 

             Outpatient   Attender: Khadra Tejada [facility] Main Office  2021 1

0:45:00 AM EST  

                                        [facility] ([facility]

 

             Outpatient   Attender: Khadra Tejada [facility] Main Office  2021 0

2:45:00 PM EST  

                                        [facility] ([facility]

 

             Outpatient   Attender: Khadra Tejada [facility] Main Office  2021 1

1:00:00 AM EST  

                                        [facility] ([facility]

 

                OFFICE OUTPATIENT NEW 30 MINUTES Attender: GENTRY VANEGAS MD Ph

ysical Therapy 

10/20/2020 02:45:00 PM EDT                           [facility] ([facility]

[facility]

 

             Outpatient   Attender: Khadra Tejaad [facility] Main Office  2020 0

1:30:00 PM EDT  

                                        [facility] ([facility]

 

             Outpatient   Attender: Khadra Tejada [facility] Main Office  2020 0

4:00:00 PM EDT  

                                        [facility] ([facility]

 

                Outpatient      Referrer: Yudy PATTERSON.STEFANO 2020 12:00:00 AM 

EDT - 2020 11:05:10 AM EDT                           [facility]

 

                Outpatient      Attender: Yudy HORNESTEFANO  12:00:00 AM 

EDT - 07/15/2020 09:58:49 AM EDT                           [facility]

 

             Outpatient   Attender: Khadra Tejada [facility] Main Office  2020 1

0:30:00 AM EDT  

                                        [facility] ([facility]

 

                Outpatient      Referrer: Yudy LIMASTEFANO-SJP.STEFANO 2020 12:00:00 AM 

EDT - 2020 10:23:56 AM EDT                           [facility]

 

                Outpatient      Attender: Yudy BURGERSJMIRTA.STEFANO  12:00:00 AM 

EDT - 2020 09:33:07 AM EDT                           [facility]

 

             Outpatient   Attender: Khadra Tejada [facility] Main Office  2020 1

2:30:00 PM EST  

                                        [facility] ([facility]

 

           Outpatient                       2020 01:36:00 PM EST          

  [facility]



                                                                                
                                                                                
                                              



Immunizations

          



             Vaccine      Date         Status       Description  Data Source(s)

 

             Pneumococcal conjugate PCV 13 2020 01:02:00 PM EST completed 

                [facility] 

([facility]

 

             New in .  IIV4 2020 01:02:00 PM EST completed            

     [facility] ([facility]



                                                                                
                 



Medications

          



          Medication Brand Name Start Date Product Form Dose      Route     Admi

nistrative 

Instructions Pharmacy Instructions Status     Indications Reaction   Description

 Data 

Source(s)

 

                    cetirizine hydrochloride 10 MG Oral Tablet Cetirizine HCL   

   2021 12:00:00 AM

EST                  ORAL                 active                      [facility] ([facility]

 

          50 mcg/actuation           2021 12:00:00 AM EST spray,suspension

 48                  USE 2 SPRAYS

IN EACH NOSTRIL ONCE DAILY USE 2 SPRAYS IN EACH NOSTRIL ONCE DAILY SOLD: 

2021                                                      [facility]

 

          10 mg               2021 12:00:00 AM EST tablet    90           

       TAKE ONE TABLET BY MOUTH AT 

BEDTIME    TAKE ONE TABLET BY MOUTH AT BEDTIME SOLD: 2021                 

                 [facility]

 

          Fluticasone Propionate Fluticasone Propionate 2021 12:00:00 AM E

ST                                

                      active                                      [facility]

 

                    Albuterol 0.83 MG/ML Inhalant Solution Albuterol Sulfate   0

2021 12:00:00 AM 

EST                                       active                      [facility] ([facility]

 

             2.5 mg /3 mL (0.083 %)              2021 12:00:00 AM EST solu

tion for nebulization 

225                                                 INHALE THE CONTENTS OF 1 VIA

L VIA NEBULIZER UP TO 4 TIMES A DAY FOR 

COUGHING AND WHEEZING                   INHALE THE CONTENTS OF 1 VIAL VIA NEBULI

ZER UP TO 4 TIMES 

A DAY FOR COUGHING AND WHEEZING SOLD: 2021                                

        [facility]

 

           Dexamethasone 2 MG Oral Tablet Dexamethasone 2021 12:00:00 AM E

ST                       ORAL

                              completed                               [facility]

 

        2 mg            2021 12:00:00 AM EST tablet  5               TAKE 

ONE TABLET BY MOUTH EVERY DAY 

TAKE ONE TABLET BY MOUTH EVERY DAY SOLD: 2021                             

           [facility]

 

          90 mcg/actuation           2021 12:00:00 AM EST HFA aerosol inha

ler 18                  INHALE 2 

PUFFS BY MOUTH EVERY 4-6 HOURS AS NEEDED FOR WHEEZING INHALE 2 PUFFS BY MOUTH 

EVERY 4-6 HOURS AS NEEDED FOR WHEEZING SOLD: 2021                         

               [facility]

 

                          200 ACTUAT Albuterol 0.09 MG/ACTUAT Metered Dose Inhal

er [Ventolin] Ventolin HFA

      2021 12:00:00 AM EST             RESPIRATORY             active     

              [facility]

 

          37.5-25 mg           2021 12:00:00 AM EST capsule   90          

        TAKE ONE CAPSULE BY MOUTH 

EVERY MORNING FOR FOR HIGH BLOOD PRESSURE TAKE ONE CAPSULE BY MOUTH EVERY 

MORNING FOR FOR HIGH BLOOD PRESSURE SOLD: 2021                            

            [facility]

 

          25 mg               2021 12:00:00 AM EST tablet extended release

 24 hr 90                  TAKE ONE 

TABLET BY MOUTH EVERY DAY TAKE ONE TABLET BY MOUTH EVERY DAY SOLD: 2021   

              

                                                    [facility]

 

          37.5-25 mg           2020 12:00:00 AM EDT capsule   90          

        TAKE ONE CAPSULE BY MOUTH 

EVERY MORNING FOR HIGH BLOOD PRESSURE   TAKE ONE CAPSULE BY MOUTH EVERY MORNING 

FOR HIGH BLOOD PRESSURE SOLD: 2020                                        

[facility]

 

          40 mg               2020 12:00:00 AM EDT tablet,delayed release 

(DR/EC) 90                  TAKE ONE 

TABLET BY MOUTH EVERY DAY TAKE ONE TABLET BY MOUTH EVERY DAY SOLD: 2020   

              

                                                    [facility]

 

          25 mg               2020 12:00:00 AM EDT tablet extended release

 24 hr 90                  TAKE ONE 

TABLET BY MOUTH DAILY TAKE ONE TABLET BY MOUTH DAILY SOLD: 2020           

                       

[facility]

 

          25 mg               2020 12:00:00 AM EDT tablet extended release

 24 hr 90                  TAKE 1 

TABLET BY MOUTH ONCE DAILY TAKE 1 TABLET BY MOUTH ONCE DAILY SOLD: 2020   

              

                                                    [facility]

 

          37.5-25 mg           2020 12:00:00 AM EDT capsule   90          

        TAKE 1 CAPSULE BY MOUTH 

ONCE DAILY IN THE MORNING FOR HIGH BLOOD PRESSURE TAKE 1 CAPSULE BY MOUTH ONCE 

DAILY IN THE MORNING FOR HIGH BLOOD PRESSURE SOLD: 2020                   

                     [facility]

 

          37.5-25 mg           2020 12:00:00 AM EDT capsule   30          

        TAKE ONE CAPSULE BY MOUTH 

EVERY MORNING TAKE ONE CAPSULE BY MOUTH EVERY MORNING SOLD: 2020          

                        

[facility]

 

          25 mg               2020 12:00:00 AM EDT tablet extended release

 24 hr 30                  TAKE ONE 

TABLET BY MOUTH EVERY DAY TAKE ONE TABLET BY MOUTH EVERY DAY SOLD: 2020   

              

                                                    [facility]

 

          37.5-25 mg           2020 12:00:00 AM EDT capsule   30          

        TAKE ONE CAPSULE BY MOUTH 

EVERY MORNING TAKE ONE CAPSULE BY MOUTH EVERY MORNING SOLD: 2020          

                        

[facility]

 

          25 mg               2020 12:00:00 AM EDT tablet extended release

 24 hr 30                  TAKE ONE 

TABLET BY MOUTH EVERY DAY TAKE ONE TABLET BY MOUTH EVERY DAY SOLD: 2020   

              

                                                    [facility]

 

          37.5-25 mg           2020 12:00:00 AM EDT capsule   30          

        TAKE ONE CAPSULE BY MOUTH 

EVERY MORNING TAKE ONE CAPSULE BY MOUTH EVERY MORNING SOLD: 2020          

                        

[facility]

 

          25 mg               2020 12:00:00 AM EDT tablet extended release

 24 hr 30                  TAKE ONE 

TABLET BY MOUTH EVERY DAY TAKE ONE TABLET BY MOUTH EVERY DAY SOLD: 2020   

              

                                                    [facility]

 

                    pantoprazole 40 MG Delayed Release Oral Tablet PANTOPRAZOLE 

SODIUM 2020 

12:00:00 AM EST tablet,delayed release (DR/EC) 90                              T

KIRILL ONE TABLET BY MOUTH 

EVERY DAY FOR HEARTBURN/ ACID REFLUX    TAKE ONE TABLET BY MOUTH EVERY DAY FOR 

HEARTBURN/ ACID REFLUX SOLD: 2020                                        [facility]

 

          40 mg               2020 12:00:00 AM EST tablet,delayed release 

(DR/EC) 90                  TAKE ONE 

TABLET BY MOUTH EVERY DAY FOR HEARTBURN/ ACID REFLUX TAKE ONE TABLET BY MOUTH 

EVERY DAY FOR HEARTBURN/ ACID REFLUX SOLD: 2020                           

             [facility]

 

          40 mg               2020 12:00:00 AM EST tablet,delayed release 

(DR/EC) 30                  TAKE ONE 

TABLET BY MOUTH EVERY DAY AT 0600 TAKE ONE TABLET BY MOUTH EVERY DAY AT 0600 

SOLD: 2020                                                 [facility]

 

             2.5 mg /3 mL (0.083 %)              2020 12:00:00 AM EST solu

tion for nebulization 75

                                        1 VIAL VIA NEBULIZER THREE TIMES A DAY 1

 VIAL VIA NEBULIZER THREE TIMES A DAY

             SOLD: 2020                                        [facility]

s

 

          20 mg               2020 12:00:00 AM EST tablet    5            

       TAKE 1 TABLET BY MOUTH ONCE A DAY 

FOR 5 DAYS TAKE 1 TABLET BY MOUTH ONCE A DAY FOR 5 DAYS SOLD: 2020        

                          

[facility]

 

          250 mg              2020 12:00:00 AM EST tablet    6            

       TAKE TWO TABLETS BY MOUTH AT ONCE

 ON THE FIRST DAY THEN TAKE ONE DAILY THEREAFTER TAKE TWO TABLETS BY MOUTH AT 

ONCE ON THE FIRST DAY THEN TAKE ONE DAILY THEREAFTER SOLD: 2020           

                             

[facility]

 

        NEBULIZER         2020 12:00:00 AM EST misc    1               USE

 AS DIRECTED USE AS DIRECTED 

SOLD: 2020                                                 [facility]



                                                                                
                                                                                
                                                                                
                                                                                
                                      



Insurance Providers

          



             Payer name   Policy type / Coverage type Policy ID    Covered party

 ID Covered 

party's relationship to shi Policy Shi             Plan Information

 

          MEDICARE            [id number]           SP                  [id number]

X63

 

          MEDICARE            [id number]           Kate                 [id number]

X63

 

          MEDICARE  C         [id number]           S                   [id number]

X63

 

          MEDICARE            [id number]           SP                  [id number]



                                                                                
                                      



Problems, Conditions, and Diagnoses

          



           Code       Display Name Description Problem Type Effective Dates Data

 Source(s)

 

                [id number]       Gastroesophageal reflux disease Gastroesophageal

 reflux disease 

Problem                   2021 12:00:00 AM EST [facility] ([facility]

 

             [id number]     Essential hypertension Essential hypertension Problem 

     2021 

12:00:00 AM EST                         [facility] ([facility]

 

             R06.09       Other forms of dyspnea Other forms of dyspnea Diagnosi

s    2020 

07:21:05 AM EDT                         [facility]

 

           R07.89     Other chest pain Other chest pain Diagnosis  2020 07

:21:05 AM EDT 

[facility]



                                                                                
                                                



Surgeries/Procedures

          



             Procedure    Description  Date         Indications  Data Source(s)

 

             RADEX FOOT COMPLETE MINIMUM 3 VIEWS              10/20/2020 12:00:0

0 AM EDT              [facility] ([facility])



                                                                                
        



Results

          



                    ID                  Date                Data Source

 

                    [id number]             2021 12:59:00 PM EST [facility]









          Name      Value     Range     Interpretation Code Description Data Bing

rce(s) Supporting 

Document(s)

 

                                        Respiratory pathogens identified [Type] 

in Nasopharynx by Probe and target 

amplification method SARS-CoV-2 (COVID 19)                                  [facility]      

 

                                        This lab was ordered by [facility] a

nd reported by [facility].

 









                    ID                  Date                Data Source

 

                    [id number]            2020 08:17:00 AM EST [facility] ([facility]









          Name      Value     Range     Interpretation Code Description Data Bing

rce(s) Supporting 

Document(s)

 

           Estimated Average Glucose 134 mg/dL                            

 [facility]                                    

 

          Hemoglobin A1c 6.3 %                                   [facility] ([facility]  

 

                                        REFERENCE RANGES:



                                        4.5-5.6%  NORMAL

                                        5.7-6.4%  SUGGESTS IMPAIRED GLUCOSE META

BOLISM

>= 6.5%  ABNORMAL

 









                    ID                  Date                Data Source

 

                    [id number]            2020 08:17:00 AM EST [facility] ([facility]









          Name      Value     Range     Interpretation Code Description Data Bing

rce(s) Supporting 

Document(s)

 

           Calcitriol [Mass/volume] in Serum or Plasma 37.9 pg/mL 19.9-79.3     

                   [facility] 

([facility]          

 

                                        Performed at:  [address]  [id number]

61

: Berny Paulino MD, Phone:  [phone]

 









                    ID                  Date                Data Source

 

                    [id number]            2020 08:17:00 AM EST [facility] ([facility]









          Name      Value     Range     Interpretation Code Description Data Bing

rce(s) Supporting 

Document(s)

 

          Triglycerides Level 116 mg/dL                               [facility] ([facility]  

 

          Cholesterol Level 143 mg/dL                               [facility]  

 

          LDL Cholesterol 73 mg/dL                                [facility]  

 

          HDL Cholesterol 47 mg/dL                                [facility]  

 

          Cholesterol Risk Ratio 3.042                                   [facility] 

([facility]  

 

          Non-HDL-C 96 mg/dL                                [facility]  







                                        Procedure

 

                                          



                                                                                
                                                



Social History

          



           Code       Duration   Value      Status     Description Data Source(s

)

 

             Smoking      2020 12:00:00 AM EDT Patient is a former smoker 

completed    Patient 

is a former smoker                      [facility] ([facility]



                                                                                
                  



Vital Signs

          



                    ID                  Date                Data Source

 

                    UNK                                      









           Name       Value      Range      Interpretation Code Description Data

 Source(s)

 

           Body mass index (BMI) [Ratio] 37.2 kg/m2                       37.2 k

g/m2 [facility] ([facility]

 

           Body weight 200.38 [lb_av]                       200.38 [lb_av] [facility]

T ([facility] 

)

 

           Body height 61.5 [in_i]                       61.5 [in_i] [facility] ([facility]

 

                                        5'1.50" 

 

           Body temperature 96.4 [degF]                       96.4 [degF] [facility]

 ([facility] 

)

 

           Body mass index (BMI) [Ratio] 37.4 kg/m2                       37.4 k

g/m2 [facility] ([facility]

 

           Ideal body weight 110 [lb_av]                       110 [lb_av] [facility]

T ([facility]

 

           Oxygen saturation in Arterial blood by Pulse oximetry 93 %           

                  93 %       [facility] 

([facility]

 

           Body weight 204.25 [lb_av]                       204.25 [lb_av] [facility]

T ([facility]

 

           Body height 62.0 [in_i]                       62.0 [in_i] [facility] ([facility]

 

                                        5'2" 

 

           Respiratory rate 17 /min                          17 /min    [facility] (

[facility]

 

           Body temperature 96.9 [degF]                       96.9 [degF] [facility]

 ([facility]

 

           Heart rate 93 /min                          93 /min    [facility]

 

           Diastolic blood pressure 84 mm[Hg]                        84 mm[Hg]  

[facility] ([facility]

 

           Systolic blood pressure 110 mm[Hg]                       110 mm[Hg] M

[facility] ([facility]

 

           Body mass index (BMI) [Ratio] 37.4 kg/m2                       37.4 k

g/m2 [facility] ([facility]

 

           Ideal body weight 110 [lb_av]                       110 [lb_av] [facility]

T ([facility]

 

           Oxygen saturation in Arterial blood by Pulse oximetry 98 %           

                  98 %       [facility] 

([facility]

 

           Body weight 204.50 [lb_av]                       204.50 [lb_av] [facility]

T ([facility]

 

           Body height 62.0 [in_i]                       62.0 [in_i] [facility] ([facility]

 

                                        5'2" 

 

           Respiratory rate 17 /min                          17 /min    [facility] (

[facility]

 

           Body temperature 96.8 [degF]                       96.8 [degF] [facility]

 ([facility]

 

           Heart rate 90 /min                          90 /min    [facility]

 

           Diastolic blood pressure 88 mm[Hg]                        88 mm[Hg]  

[facility] ([facility]

 

           Systolic blood pressure 129 mm[Hg]                       129 mm[Hg] M

[facility] ([facility]

 

           Body mass index (BMI) [Ratio] 38.3 kg/m2                       38.3 k

g/m2 [facility] ([facility]

 

           Oxygen saturation in Arterial blood by Pulse oximetry 93 %           

                  93 %       [facility] 

([facility]

 

           Body weight 209.38 [lb_av]                       209.38 [lb_av] [facility]

T ([facility]

 

           Body height 62.0 [in_i]                       62.0 [in_i] [facility] ([facility]

 

                                        5'2" 

 

           Respiratory rate 20 /min                          20 /min    [facility] (

[facility]

 

           Body temperature 98.1 [degF]                       98.1 [degF] [facility]

 ([facility]

 

           Heart rate 80 /min                          80 /min    [facility]

 

           Diastolic blood pressure 93 mm[Hg]                        93 mm[Hg]  

[facility] ([facility]

 

           Systolic blood pressure 130 mm[Hg]                       130 mm[Hg] M

[facility] ([facility]

 

           Diastolic blood pressure 92 mm[Hg]                        92 mm[Hg]  

[facility] ([facility]

 

           Systolic blood pressure 145 mm[Hg]                       145 mm[Hg] M

[facility] ([facility]

 

           Body mass index (BMI) [Ratio] 38.5 kg/m2                       38.5 k

g/m2 [facility] ([facility]

 

           Oxygen saturation in Arterial blood by Pulse oximetry 92 %           

                  92 %       [facility] 

([facility]

 

           Body weight 210.38 [lb_av]                       210.38 [lb_av] [facility]

T ([facility]

 

           Body height 62.0 [in_i]                       62.0 [in_i] [facility] ([facility]

 

                                        5'2" 

 

           Respiratory rate 18 /min                          18 /min    [facility] (

[facility]

 

           Body temperature 99.4 [degF]                       99.4 [degF] [facility]

 ([facility]

 

           Heart rate 88 /min                          88 /min    [facility]

 

           Diastolic blood pressure 72 mm[Hg]                        72 mm[Hg]  

[facility] ([facility]

 

           Systolic blood pressure 96 mm[Hg]                        96 mm[Hg]  M

[facility]

## 2021-02-16 NOTE — CCD
Summarization Of Episode

                             Created on: 2021



PETAR SIMPSON

External Reference #: [id number]

: 1945

Sex: Female



Demographics





                          Address                   [address] [place]  [address]

 

                          Home Phone                [phone]

 

                          Preferred Language        English

 

                          Marital Status            

 

                          Church Affiliation     NONE

 

                          Race                      White

 

                          Ethnic Group              Not  or 





Author





                          Author                    [facility]

 

                          Organization              [facility]

 

                          Address                   Unknown

 

                          Phone                     Unavailable







Support





                Name            Relationship    Address         Phone

 

                RE              Next Of Kin     Unknown         Unavailable

 

                UE              Next Of Kin     Unknown         Unavailable

 

                    KYLEE DELACRUZ   Next Of Kin         [address] [place], [place]  [address] [phone]







Care Team Providers





                    Care Team Member Name Role                Phone

 

                    Yudy Grimaldo MD Unavailable         Unavailable

 

                    Yudy Grimaldo MD Unavailable         Unavailable

 

                    Yudy Grimaldo MD Unavailable         Unavailable

 

                    Yudy Grimaldo MD Unavailable         Unavailable

 

                    Yudy Grimaldo MD Unavailable         Unavailable

 

                    Yudy Grimaldo MD Unavailable         Unavailable

 

                    Yudy Grimaldo MD Unavailable         Unavailable

 

                    Yudy Grimaldo MD Unavailable         Unavailable

 

                    Yudy Grimaldo MD Unavailable         Unavailable

 

                    Yudy Grimaldo MD Unavailable         Unavailable

 

                    Yudy Grimaldo MD Unavailable         Unavailable

 

                    Yudy Grimaldo MD Unavailable         Unavailable

 

                    Yudy Grimaldo MD Unavailable         Unavailable

 

                    Yudy Grimaldo MD Unavailable         Unavailable

 

                    Yudy Grimaldo MD Unavailable         Unavailable

 

                    Yudy Grimaldo MD Unavailable         Unavailable

 

                    Yudy Grimaldo MD Unavailable         Unavailable

 

                    Yudy Grimaldo MD Unavailable         Unavailable

 

                    Yudy Grimaldo MD Unavailable         Unavailable

 

                    Yudy Girmaldo MD Unavailable         Unavailable

 

                    Yudy Grimaldo MD Unavailable         Unavailable

 

                    Yudy Grimaldo MD Unavailable         Unavailable

 

                    Yudy Grimaldo MD Unavailable         Unavailable

 

                    Yudy Grimaldo MD Unavailable         Unavailable

 

                    Yudy Grimaldo MD Unavailable         Unavailable

 

                    Yudy Grimaldo MD Unavailable         Unavailable

 

                    Yudy Grimaldo MD Unavailable         Unavailable

 

                    Yudy Grimaldo MD Unavailable         Unavailable

 

                    Yudy Grimaldo MD Unavailable         Unavailable

 

                    Yudy Grimaldo MD Unavailable         Unavailable

 

                    Yudy Grimaldo MD Unavailable         Unavailable

 

                    Yudy Grimaldo MD Unavailable         Unavailable

 

                    Yudy Grimaldo MD Unavailable         Unavailable

 

                    Yudy Grimaldo MD Unavailable         Unavailable

 

                    Yudy Grimaldo MD Unavailable         Unavailable

 

                    Yudy Grimaldo MD Unavailable         Unavailable

 

                    Yudy Grimaldo MD Unavailable         Unavailable

 

                    Nixon Grimaldotech MD Unavailable         Unavailable

 

                    Yudy Grimaldo MD Unavailable         Unavailable

 

                    Nixon Grimaldotech MD Unavailable         Unavailable

 

                    Yudy Grimaldo MD Unavailable         Unavailable

 

                    Nixon Grimald[employer] MD Unavailable         Unavailable

 

                    Nixon Grimald[employer] MD Unavailable         Unavailable

 

                    Meliton Grimaldojtech MD Unavailable         Unavailable

 

                    Nixon Grimald[employer] MD Unavailable         Unavailable

 

                    Meliton Grimaldojtech MD Unavailable         Unavailable

 

                    Meliton Grimaldojtech MD Unavailable         Unavailable

 

                    Meliton Grimaldojtech MD Unavailable         Unavailable

 

                    Meliton Grimaldojtech MD Unavailable         Unavailable

 

                    Meliton Grimaldojtech MD Unavailable         Unavailable

 

                    Nixon Grimald[employer] MD Unavailable         Unavailable

 

                    Nixon Grimald[employer] MD Unavailable         Unavailable

 

                    Meliton Grimaldojtech MD Unavailable         Unavailable

 

                    Meliton Grimaldojtech MD Unavailable         Unavailable

 

                    Nixon Grimald[employer] MD Unavailable         Unavailable

 

                    Meliton Grimaldojtech MD Unavailable         Unavailable

 

                    Nixon Gr[employer] MD Unavailable         Unavailable

 

                    Nixon Grimald[employer] MD Unavailable         Unavailable

 

                    Pleskach,  Khadra FNP Unavailable         Unavailable

 

                    Pleskach,  Khadra FNP Unavailable         Unavailable

 

                    Pleskach,  Khadra FNP Unavailable         Unavailable

 

                    Pleskach,  Khadra FNP Unavailable         Unavailable

 

                    Pleskach,  Khadra FNP Unavailable         Unavailable

 

                    Pleskach,  Khadra FNP Unavailable         Unavailable

 

                    Pleskach,  Khadra FNP Unavailable         Unavailable

 

                    Pleskach,  Khadra FNP Unavailable         Unavailable

 

                    Pleskach,  Khadra FNP Unavailable         Unavailable

 

                    Pleskach,  Khadra FNP Unavailable         Unavailable

 

                    Pleskach,  Khadra FNP Unavailable         Unavailable

 

                    Pleskach,  Khadra FNP Unavailable         Unavailable

 

                    Pleskach,  Khadra FNP Unavailable         Unavailable

 

                    Pleskach,  Khadra FNP Unavailable         Unavailable

 

                    Pleskach,  Khadra FNP Unavailable         Unavailable

 

                    Pleskach,  Khadra FNP Unavailable         Unavailable

 

                    Pleskach,  Khadra FNP Unavailable         Unavailable

 

                    Pleskach,  Khadra FNP Unavailable         Unavailable

 

                    Pleskach,  Khadra FNP Unavailable         Unavailable

 

                    Pleskach,  Khadra FNP Unavailable         Unavailable

 

                    Pleskach,  Khadra FNP Unavailable         Unavailable

 

                    Pleskach,  Khadra FNP Unavailable         Unavailable

 

                    Pleskach,  Khadra FNP Unavailable         Unavailable

 

                    Pleskach,  Khadra FNP Unavailable         Unavailable

 

                    Pleskach,  Khadra FNP Unavailable         Unavailable

 

                    Pleskach,  Khadra FNP Unavailable         Unavailable

 

                    Pleskach,  Khadra FNP Unavailable         Unavailable

 

                    Pleskach,  Khadra FNP Unavailable         Unavailable

 

                    Pleskach,  Khadra FNP Unavailable         Unavailable

 

                    Pleskach,  Khadra FNP Unavailable         Unavailable

 

                    VANEGAS,  GENTRY MD Unavailable         Unavailable

 

                    VANEGAS,  GENTRY MD Unavailable         Unavailable

 

                    VANEGAS,  GENTRY MD Unavailable         Unavailable

 

                    VANEGAS,  GENTRY MD Unavailable         Unavailable

 

                    VANEGAS,  GENTRY MD Unavailable         Unavailable

 

                    VANEGAS,  GENTRY MD Unavailable         Unavailable

 

                    VANEGAS,  GENTRY MD Unavailable         Unavailable

 

                    VANEGAS,  GENTRY MD Unavailable         Unavailable

 

                    VANEGAS,  GENTRY MD Unavailable         Unavailable

 

                    VANEGAS,  GENTRY MD Unavailable         Unavailable

 

                    VANEGAS,  GENTRY MD Unavailable         Unavailable

 

                    VANEGAS,  GENTRY MD Unavailable         Unavailable

 

                    VANEGAS,  GENTRY MD Unavailable         Unavailable

 

                    VANEGAS,  GENTRY MD Unavailable         Unavailable

 

                    VANEGAS,  GENTRY MD Unavailable         Unavailable

 

                    VANEGAS,  GENTRY MD Unavailable         Unavailable

 

                    VANEGAS,  GENTRY MD Unavailable         Unavailable

 

                    VANEGAS,  GENTRY MD Unavailable         Unavailable

 

                    VANEGAS,  GENTRY MD Unavailable         Unavailable

 

                    VANEGAS,  GENTRY MD Unavailable         Unavailable

 

                    VANEGAS,  GENTRY MD Unavailable         Unavailable

 

                    VANEGAS,  GENTRY MD Unavailable         Unavailable

 

                    VANEGAS,  GENTRY MD Unavailable         Unavailable

 

                    VANEGAS,  GENTRY MD Unavailable         Unavailable

 

                    VANEGAS,  GENTRY MD Unavailable         Unavailable

 

                    VANEGAS,  GENTRY MD Unavailable         Unavailable

 

                    VANEGAS,  GENTRY MD Unavailable         Unavailable

 

                    VANEGAS,  GENTRY MD Unavailable         Unavailable

 

                    VANEGAS,  GENTRY MD Unavailable         Unavailable

 

                    VANEGAS,  GENTRY MD Unavailable         Unavailable



                                  



Re-disclosure Warning

          The records that you are about to access may contain information from 
federally-assisted alcohol or drug abuse programs. If such information is 
present, then the following federally mandated warning applies: This information
has been disclosed to you from records protected by federal confidentiality 
rules (42 CFR part 2). The federal rules prohibit you from making any further 
disclosure of this information unless further disclosure is expressly permitted 
by the written consent of the person to whom it pertains or as otherwise 
permitted by 42 CFR part 2. A general authorization for the release of medical 
or other information is NOT sufficient for this purpose. The Federal rules 
restrict any use of the information to criminally investigate or prosecute any 
alcohol or drug abuse patient.The records that you are about to access may 
contain highly sensitive health information, the redisclosure of which is 
protected by Article 27-F of the [facility] Public Health law. If you 
continue you may have access to information: Regarding HIV / AIDS; Provided by 
facilities licensed or operated by the [facility] Office of Mental Health; 
or Provided by the [place] Office for People With Developmental 
Disabilities. If such information is present, then the following [place] 
mandated warning applies: This information has been disclosed to you from 
confidential records which are protected by state law. State law prohibits you 
from making any further disclosure of this information without the specific 
written consent of the person to whom it pertains, or as otherwise permitted by 
law. Any unauthorized further disclosure in violation of state law may result in
a fine or retirement sentence or both. A general authorization for the release of 
medical or other information is NOT sufficient authorization for further disc
losure.                                                                         
    



Encounters

          



           Encounter  Providers  Location   Date       Indications Data Source(s

)

 

             Outpatient   Attender: Khadra Tejada [facility] Main Office  2021 1

0:45:00 AM EST  

                                        [facility] ([facility]

 

             Outpatient   Attender: Khadra Tejada [facility] Main [facility]  2021 0

2:45:00 PM EST  

                                        [facility] ([facility]

 

             Outpatient   Attender: Khadra Tejada [facility] Main [facility]  2021 1

1:00:00 AM EST  

                                        [facility] ([facility]

 

                OFFICE OUTPATIENT NEW 30 MINUTES Attender: GENTRY VANEGAS MD Ph

ysical Therapy 

10/20/2020 02:45:00 PM EDT                           [facility] ([facility]

[facility])

 

             Outpatient   Attender: Khadra Tejada [facility] Main Office  2020 0

1:30:00 PM EDT  

                                        [facility] ([facility]

 

             Outpatient   Attender: Khadra Tejada [facility] Main [facility]  2020 0

4:00:00 PM EDT  

                                        [facility] ([facility]

 

                Outpatient      Referrer: Yudy PATTERSON.STEFANO 2020 12:00:00 AM 

EDT - 2020 11:05:10 AM EDT                           [facility]

 

                Outpatient      Attender: Yudy HORNESTEFANO  12:00:00 AM 

EDT - 07/15/2020 09:58:49 AM EDT                           [facility]

 

             Outpatient   Attender: Khadra Tejada [facility] Main [facility]  2020 1

0:30:00 AM EDT  

                                        [facility] ([facility]

 

                Outpatient      Referrer: Yudy LIMASTEFANO-SJP.STEFANO 2020 12:00:00 AM 

EDT - 2020 10:23:56 AM EDT                           [facility]

 

                Outpatient      Attender: Yudy BURGERSJMIRTA.STEFANO  12:00:00 AM 

EDT - 2020 09:33:07 AM EDT                           [facility]

 

             Outpatient   Attender: Khadra Tejada [facility] Main Office  2020 1

2:30:00 PM EST  

                                        [facility] ([facility]

 

           Outpatient                       2020 01:36:00 PM EST          

  [facility]



                                                                                
                                                                                
                                              



Immunizations

          



             Vaccine      Date         Status       Description  Data Source(s)

 

             Pneumococcal conjugate PCV 13 2020 01:02:00 PM EST completed 

                [facility] 

([facility]

 

             New in .  IIV4 2020 01:02:00 PM EST completed            

     [facility] ([facility]



                                                                                
                 



Medications

          



          Medication Brand Name Start Date Product Form Dose      Route     Admi

nistrative 

Instructions Pharmacy Instructions Status     Indications Reaction   Description

 Data 

Source(s)

 

                    cetirizine hydrochloride 10 MG Oral Tablet Cetirizine HCL   

   2021 12:00:00 AM

EST                  ORAL                 active                      [facility] ([facility]

 

          50 mcg/actuation           2021 12:00:00 AM EST spray,suspension

 48                  USE 2 SPRAYS

IN EACH NOSTRIL ONCE DAILY USE 2 SPRAYS IN EACH NOSTRIL ONCE DAILY SOLD: 

2021                                                      [facility]

 

          10 mg               2021 12:00:00 AM EST tablet    90           

       TAKE ONE TABLET BY MOUTH AT 

BEDTIME    TAKE ONE TABLET BY MOUTH AT BEDTIME SOLD: 2021                 

                 [facility]

 

          Fluticasone Propionate Fluticasone Propionate 2021 12:00:00 AM E

ST                                

                      active                                      [facility]

 

                    Albuterol 0.83 MG/ML Inhalant Solution Albuterol Sulfate   0

2021 12:00:00 AM 

EST                                       active                      [facility] ([facility]

 

             2.5 mg /3 mL (0.083 %)              2021 12:00:00 AM EST solu

tion for nebulization 

225                                                 INHALE THE CONTENTS OF 1 VIA

L VIA NEBULIZER UP TO 4 TIMES A DAY FOR 

COUGHING AND WHEEZING                   INHALE THE CONTENTS OF 1 VIAL VIA NEBULI

ZER UP TO 4 TIMES 

A DAY FOR COUGHING AND WHEEZING SOLD: 2021                                

        [facility]

 

           Dexamethasone 2 MG Oral Tablet Dexamethasone 2021 12:00:00 AM E

ST                       ORAL

                              completed                               [facility]

 

        2 mg            2021 12:00:00 AM EST tablet  5               TAKE 

ONE TABLET BY MOUTH EVERY DAY 

TAKE ONE TABLET BY MOUTH EVERY DAY SOLD: 2021                             

           [facility]

 

          90 mcg/actuation           2021 12:00:00 AM EST HFA aerosol inha

ler 18                  INHALE 2 

PUFFS BY MOUTH EVERY 4-6 HOURS AS NEEDED FOR WHEEZING INHALE 2 PUFFS BY MOUTH 

EVERY 4-6 HOURS AS NEEDED FOR WHEEZING SOLD: 2021                         

               [facility]

 

                          200 ACTUAT Albuterol 0.09 MG/ACTUAT Metered Dose Inhal

er [Ventolin] Ventolin HFA

      2021 12:00:00 AM EST             RESPIRATORY             active     

              [facility] ([facility]

 

          37.5-25 mg           2021 12:00:00 AM EST capsule   90          

        TAKE ONE CAPSULE BY MOUTH 

EVERY MORNING FOR FOR HIGH BLOOD PRESSURE TAKE ONE CAPSULE BY MOUTH EVERY 

MORNING FOR FOR HIGH BLOOD PRESSURE SOLD: 2021                            

            [facility]

 

          25 mg               2021 12:00:00 AM EST tablet extended release

 24 hr 90                  TAKE ONE 

TABLET BY MOUTH EVERY DAY TAKE ONE TABLET BY MOUTH EVERY DAY SOLD: 2021   

              

                                                    [facility]

 

          37.5-25 mg           2020 12:00:00 AM EDT capsule   90          

        TAKE ONE CAPSULE BY MOUTH 

EVERY MORNING FOR HIGH BLOOD PRESSURE   TAKE ONE CAPSULE BY MOUTH EVERY MORNING 

FOR HIGH BLOOD PRESSURE SOLD: 2020                                        

[facility]

 

          40 mg               2020 12:00:00 AM EDT tablet,delayed release 

(DR/EC) 90                  TAKE ONE 

TABLET BY MOUTH EVERY DAY TAKE ONE TABLET BY MOUTH EVERY DAY SOLD: 2020   

              

                                                    [facility]

 

          25 mg               2020 12:00:00 AM EDT tablet extended release

 24 hr 90                  TAKE ONE 

TABLET BY MOUTH DAILY TAKE ONE TABLET BY MOUTH DAILY SOLD: 2020           

                       

[facility]

 

          25 mg               2020 12:00:00 AM EDT tablet extended release

 24 hr 90                  TAKE 1 

TABLET BY MOUTH ONCE DAILY TAKE 1 TABLET BY MOUTH ONCE DAILY SOLD: 2020   

              

                                                    [facility]

 

          37.5-25 mg           2020 12:00:00 AM EDT capsule   90          

        TAKE 1 CAPSULE BY MOUTH 

ONCE DAILY IN THE MORNING FOR HIGH BLOOD PRESSURE TAKE 1 CAPSULE BY MOUTH ONCE 

DAILY IN THE MORNING FOR HIGH BLOOD PRESSURE SOLD: 2020                   

                     [facility]

 

          37.5-25 mg           2020 12:00:00 AM EDT capsule   30          

        TAKE ONE CAPSULE BY MOUTH 

EVERY MORNING TAKE ONE CAPSULE BY MOUTH EVERY MORNING SOLD: 2020          

                        

[facility]

 

          25 mg               2020 12:00:00 AM EDT tablet extended release

 24 hr 30                  TAKE ONE 

TABLET BY MOUTH EVERY DAY TAKE ONE TABLET BY MOUTH EVERY DAY SOLD: 2020   

              

                                                    [facility]

 

          37.5-25 mg           2020 12:00:00 AM EDT capsule   30          

        TAKE ONE CAPSULE BY MOUTH 

EVERY MORNING TAKE ONE CAPSULE BY MOUTH EVERY MORNING SOLD: 2020          

                        

[facility]

 

          25 mg               2020 12:00:00 AM EDT tablet extended release

 24 hr 30                  TAKE ONE 

TABLET BY MOUTH EVERY DAY TAKE ONE TABLET BY MOUTH EVERY DAY SOLD: 2020   

              

                                                    [facility]

 

          37.5-25 mg           2020 12:00:00 AM EDT capsule   30          

        TAKE ONE CAPSULE BY MOUTH 

EVERY MORNING TAKE ONE CAPSULE BY MOUTH EVERY MORNING SOLD: 2020          

                        

[facility]

 

          25 mg               2020 12:00:00 AM EDT tablet extended release

 24 hr 30                  TAKE ONE 

TABLET BY MOUTH EVERY DAY TAKE ONE TABLET BY MOUTH EVERY DAY SOLD: 2020   

              

                                                    [facility]

 

                    pantoprazole 40 MG Delayed Release Oral Tablet PANTOPRAZOLE 

SODIUM 2020 

12:00:00 AM EST tablet,delayed release (DR/EC) 90                              T

KIRILL ONE TABLET BY MOUTH 

EVERY DAY FOR HEARTBURN/ ACID REFLUX    TAKE ONE TABLET BY MOUTH EVERY DAY FOR 

HEARTBURN/ ACID REFLUX SOLD: 2020                                        [facility]

 

          40 mg               2020 12:00:00 AM EST tablet,delayed release 

(DR/EC) 90                  TAKE ONE 

TABLET BY MOUTH EVERY DAY FOR HEARTBURN/ ACID REFLUX TAKE ONE TABLET BY MOUTH 

EVERY DAY FOR HEARTBURN/ ACID REFLUX SOLD: 2020                           

             [facility]

 

          40 mg               2020 12:00:00 AM EST tablet,delayed release 

(DR/EC) 30                  TAKE ONE 

TABLET BY MOUTH EVERY DAY AT 0600 TAKE ONE TABLET BY MOUTH EVERY DAY AT 0600 

SOLD: 2020                                                 [facility]

 

             2.5 mg /3 mL (0.083 %)              2020 12:00:00 AM EST solu

tion for nebulization 75

                                        1 VIAL VIA NEBULIZER THREE TIMES A DAY 1

 VIAL VIA NEBULIZER THREE TIMES A DAY

             SOLD: 2020                                        [facility]

s

 

          20 mg               2020 12:00:00 AM EST tablet    5            

       TAKE 1 TABLET BY MOUTH ONCE A DAY 

FOR 5 DAYS TAKE 1 TABLET BY MOUTH ONCE A DAY FOR 5 DAYS SOLD: 2020        

                          

[facility]

 

          250 mg              2020 12:00:00 AM EST tablet    6            

       TAKE TWO TABLETS BY MOUTH AT ONCE

 ON THE FIRST DAY THEN TAKE ONE DAILY THEREAFTER TAKE TWO TABLETS BY MOUTH AT 

ONCE ON THE FIRST DAY THEN TAKE ONE DAILY THEREAFTER SOLD: 2020           

                             

[facility]

 

        NEBULIZER         2020 12:00:00 AM EST misc    1               USE

 AS DIRECTED USE AS DIRECTED 

SOLD: 2020                                                 [facility]



                                                                                
                                                                                
                                                                                
                                                                                
                                      



Insurance Providers

          



             Payer name   Policy type / Coverage type Policy ID    Covered party

 ID Covered 

party's relationship to shi Policy Shi             Plan Information

 

          MEDICARE            [id number]           SP                  [id number]

X63

 

          MEDICARE            [id number]           Kate                 [id number]

X63

 

          MEDICARE  C         [id number]           S                   [id number]

X63

 

          MEDICARE            [id number]           SP                  [id number]



                                                                                
                                      



Problems, Conditions, and Diagnoses

          



           Code       Display Name Description Problem Type Effective Dates Data

 Source(s)

 

                [id number]       Gastroesophageal reflux disease Gastroesophageal

 reflux disease 

Problem                   2021 12:00:00 AM EST [facility] ([facility]

 

             [id number]     Essential hypertension Essential hypertension Problem 

     2021 

12:00:00 AM EST                         [facility] ([facility]

 

             R06.09       Other forms of dyspnea Other forms of dyspnea Diagnosi

s    2020 

07:21:05 AM EDT                         [facility]

 

           R07.89     Other chest pain Other chest pain Diagnosis  2020 07

:21:05 AM EDT 

[facility]



                                                                                
                                                



Surgeries/Procedures

          



             Procedure    Description  Date         Indications  Data Source(s)

 

             RADEX FOOT COMPLETE MINIMUM 3 VIEWS              10/20/2020 12:00:0

0 AM EDT              [facility] ([facility])



                                                                                
        



Results

          



                    ID                  Date                Data Source

 

                    [id number]             2021 12:59:00 PM EST [facility]









          Name      Value     Range     Interpretation Code Description Data Bing

rce(s) Supporting 

Document(s)

 

                                        Respiratory pathogens identified [Type] 

in Nasopharynx by Probe and target 

amplification method SARS-CoV-2 (COVID 19)                                  [facility]      

 

                                        This lab was ordered by [facility] a

nd reported by [facility].

 









                    ID                  Date                Data Source

 

                    [id number]            2020 08:17:00 AM EST [facility] ([facility]









          Name      Value     Range     Interpretation Code Description Data Bing

rce(s) Supporting 

Document(s)

 

           Estimated Average Glucose 134 mg/dL                            

 [facility]                                    

 

          Hemoglobin A1c 6.3 %                                   [facility] ([facility]  

 

                                        REFERENCE RANGES:



                                        4.5-5.6%  NORMAL

                                        5.7-6.4%  SUGGESTS IMPAIRED GLUCOSE META

BOLISM

>= 6.5%  ABNORMAL

 









                    ID                  Date                Data Source

 

                    [id number]            2020 08:17:00 AM EST [facility] ([facility]









          Name      Value     Range     Interpretation Code Description Data Bing

rce(s) Supporting 

Document(s)

 

           Calcitriol [Mass/volume] in Serum or Plasma 37.9 pg/mL 19.9-79.3     

                   [facility] 

([facility]          

 

                                        Performed at:  [address]  [id number]

61

: Berny Paulino MD, Phone:  [phone]

 









                    ID                  Date                Data Source

 

                    [id number]            2020 08:17:00 AM EST [facility] ([facility]









          Name      Value     Range     Interpretation Code Description Data Bing

rce(s) Supporting 

Document(s)

 

          Triglycerides Level 116 mg/dL                               [facility] ([facility]  

 

          Cholesterol Level 143 mg/dL                               [facility] ([facility]  

 

          LDL Cholesterol 73 mg/dL                                [facility]  

 

          HDL Cholesterol 47 mg/dL                                [facility] ([facility]  

 

          Cholesterol Risk Ratio 3.042                                   [facility] 

([facility]  

 

          Non-HDL-C 96 mg/dL                                [facility]  







                                        Procedure

 

                                          



                                                                                
                                                



Social History

          



           Code       Duration   Value      Status     Description Data Source(s

)

 

             Smoking      2020 12:00:00 AM EDT Patient is a former smoker 

completed    Patient 

is a former smoker                      [facility] ([facility]



                                                                                
                  



Vital Signs

          



                    ID                  Date                Data Source

 

                    UNK                                      









           Name       Value      Range      Interpretation Code Description Data

 Source(s)

 

           Body mass index (BMI) [Ratio] 37.2 kg/m2                       37.2 k

g/m2 [facility] ([facility]

 

           Body weight 200.38 [lb_av]                       200.38 [lb_av] [facility]

T ([facility] 

)

 

           Body height 61.5 [in_i]                       61.5 [in_i] [facility] ([facility] )

 

                                        5'1.50" 

 

           Body temperature 96.4 [degF]                       96.4 [degF] [facility]

 ([facility] 

)

 

           Body mass index (BMI) [Ratio] 37.4 kg/m2                       37.4 k

g/m2 [facility] ([facility]

 

           Ideal body weight 110 [lb_av]                       110 [lb_av] [facility]

T ([facility]

 

           Oxygen saturation in Arterial blood by Pulse oximetry 93 %           

                  93 %       [facility] 

([facility]

 

           Body weight 204.25 [lb_av]                       204.25 [lb_av] [facility]

T ([facility]

 

           Body height 62.0 [in_i]                       62.0 [in_i] [facility] ([facility]

 

                                        5'2" 

 

           Respiratory rate 17 /min                          17 /min    [facility] (

[facility]

 

           Body temperature 96.9 [degF]                       96.9 [degF] [facility]

 ([facility]

 

           Heart rate 93 /min                          93 /min    [facility] ([facility]

 

           Diastolic blood pressure 84 mm[Hg]                        84 mm[Hg]  

[facility] ([facility]

 

           Systolic blood pressure 110 mm[Hg]                       110 mm[Hg] M

[facility] ([facility]

 

           Body mass index (BMI) [Ratio] 37.4 kg/m2                       37.4 k

g/m2 [facility] ([facility]

 

           Ideal body weight 110 [lb_av]                       110 [lb_av] [facility]

T ([facility]

 

           Oxygen saturation in Arterial blood by Pulse oximetry 98 %           

                  98 %       [facility] 

([facility]

 

           Body weight 204.50 [lb_av]                       204.50 [lb_av] [facility]

T ([facility]

 

           Body height 62.0 [in_i]                       62.0 [in_i] [facility] ([facility]

 

                                        5'2" 

 

           Respiratory rate 17 /min                          17 /min    [facility] (

[facility]

 

           Body temperature 96.8 [degF]                       96.8 [degF] [facility]

 ([facility]

 

           Heart rate 90 /min                          90 /min    [facility]

 

           Diastolic blood pressure 88 mm[Hg]                        88 mm[Hg]  

[facility] ([facility]

 

           Systolic blood pressure 129 mm[Hg]                       129 mm[Hg] M

[facility] ([facility]

 

           Body mass index (BMI) [Ratio] 38.3 kg/m2                       38.3 k

g/m2 [facility] ([facility]

 

           Oxygen saturation in Arterial blood by Pulse oximetry 93 %           

                  93 %       [facility] 

([facility]

 

           Body weight 209.38 [lb_av]                       209.38 [lb_av] [facility]

T ([facility]

 

           Body height 62.0 [in_i]                       62.0 [in_i] [facility] ([facility]

 

                                        5'2" 

 

           Respiratory rate 20 /min                          20 /min    [facility] (

[facility]

 

           Body temperature 98.1 [degF]                       98.1 [degF] [facility]

 ([facility]

 

           Heart rate 80 /min                          80 /min    [facility] ([facility]

 

           Diastolic blood pressure 93 mm[Hg]                        93 mm[Hg]  

[facility] ([facility]

 

           Systolic blood pressure 130 mm[Hg]                       130 mm[Hg] M

[facility] ([facility]

 

           Diastolic blood pressure 92 mm[Hg]                        92 mm[Hg]  

[facility] ([facility]

 

           Systolic blood pressure 145 mm[Hg]                       145 mm[Hg] M

[facility] ([facility]

 

           Body mass index (BMI) [Ratio] 38.5 kg/m2                       38.5 k

g/m2 [facility] ([facility]

 

           Oxygen saturation in Arterial blood by Pulse oximetry 92 %           

                  92 %       [facility] 

([facility]

 

           Body weight 210.38 [lb_av]                       210.38 [lb_av] [facility]

T ([facility]

 

           Body height 62.0 [in_i]                       62.0 [in_i] [facility] ([facility]

 

                                        5'2" 

 

           Respiratory rate 18 /min                          18 /min    [facility] (

[facility]

 

           Body temperature 99.4 [degF]                       99.4 [degF] [facility]

 ([facility]

 

           Heart rate 88 /min                          88 /min    [facility]

 

           Diastolic blood pressure 72 mm[Hg]                        72 mm[Hg]  

[facility] ([facility]

 

           Systolic blood pressure 96 mm[Hg]                        96 mm[Hg]  M

[facility]

## 2021-02-16 NOTE — ECGEPIP
[facility] - ED

                                       

                                       Test Date:    2021

Pat Name:     PETAR SIMPSON             Department:   

Patient ID:   [id number]                 Room:         -

Gender:       Female                   Technician:   georgina

:          1945               Requested By: Kristen Luke 

Order Number: [id number]     Reading MD:   Chad Veloz

                                 Measurements

Intervals                              Axis          

Rate:         89                       P:            25

AR:           172                      QRS:          19

QRSD:         80                       T:            17

QT:           360                                    

QTc:          438                                    

                           Interpretive Statements

Normal sinus rhythm

POOR R WAVE PROGRESSION

SIMILAR TO 21

Electronically Signed on 2021 19:44:26 EST by Chad Veloz

## 2021-02-17 VITALS — DIASTOLIC BLOOD PRESSURE: 78 MMHG | SYSTOLIC BLOOD PRESSURE: 133 MMHG

## 2021-02-17 VITALS — DIASTOLIC BLOOD PRESSURE: 88 MMHG | SYSTOLIC BLOOD PRESSURE: 122 MMHG

## 2021-02-17 VITALS — SYSTOLIC BLOOD PRESSURE: 121 MMHG | DIASTOLIC BLOOD PRESSURE: 81 MMHG

## 2021-02-17 VITALS — OXYGEN SATURATION: 91 %

## 2021-02-17 LAB
ALBUMIN SERPL BCG-MCNC: 2.7 GM/DL (ref 3.2–5.2)
ALT SERPL W P-5'-P-CCNC: 7 U/L (ref 12–78)
APPEARANCE UR: CLEAR
BACTERIA UR QL AUTO: NEGATIVE
BASE EXCESS BLDA CALC-SCNC: 9.7 MMOL/L (ref -2–2)
BILIRUB SERPL-MCNC: 0.3 MG/DL (ref 0.2–1)
BILIRUB UR QL STRIP.AUTO: NEGATIVE
BUN SERPL-MCNC: 16 MG/DL (ref 7–18)
BUN SERPL-MCNC: 18 MG/DL (ref 7–18)
CALCIUM SERPL-MCNC: 9.3 MG/DL (ref 8.8–10.2)
CALCIUM SERPL-MCNC: 9.9 MG/DL (ref 8.8–10.2)
CHLORIDE SERPL-SCNC: 95 MEQ/L (ref 98–107)
CHLORIDE SERPL-SCNC: 95 MEQ/L (ref 98–107)
CO2 BLDA CALC-SCNC: 40.2 MEQ/L (ref 23–31)
CO2 SERPL-SCNC: 35 MEQ/L (ref 21–32)
CO2 SERPL-SCNC: 36 MEQ/L (ref 21–32)
CREAT SERPL-MCNC: 1.25 MG/DL (ref 0.55–1.3)
CREAT SERPL-MCNC: 1.4 MG/DL (ref 0.55–1.3)
CREAT UR-MCNC: 53 MG/DL
GFR SERPL CREATININE-BSD FRML MDRD: 39 ML/MIN/{1.73_M2} (ref 39–?)
GFR SERPL CREATININE-BSD FRML MDRD: 44.5 ML/MIN/{1.73_M2} (ref 39–?)
GLUCOSE SERPL-MCNC: 126 MG/DL (ref 70–100)
GLUCOSE SERPL-MCNC: 140 MG/DL (ref 70–100)
GLUCOSE UR QL STRIP.AUTO: NEGATIVE MG/DL
HCO3 BLDA-SCNC: 38 MEQ/L (ref 22–26)
HCO3 STD BLDA-SCNC: 33.2 MEQ/L (ref 22–26)
HCT VFR BLD AUTO: 36.3 % (ref 36–47)
HGB BLD-MCNC: 11 G/DL (ref 12–15.5)
HGB UR QL STRIP.AUTO: NEGATIVE
KETONES UR QL STRIP.AUTO: NEGATIVE MG/DL
LEUKOCYTE ESTERASE UR QL STRIP.AUTO: NEGATIVE
MAGNESIUM SERPL-MCNC: 2.2 MG/DL (ref 1.8–2.4)
MCH RBC QN AUTO: 30.2 PG (ref 27–33)
MCHC RBC AUTO-ENTMCNC: 30.3 G/DL (ref 32–36.5)
MCV RBC AUTO: 99.7 FL (ref 80–96)
NITRITE UR QL STRIP.AUTO: NEGATIVE
PCO2 BLDA: 71.5 MMHG (ref 35–45)
PH BLDA: 7.34 UNITS (ref 7.35–7.45)
PH UR STRIP.AUTO: 6 UNITS (ref 5–9)
PLATELET # BLD AUTO: 463 10^3/UL (ref 150–450)
PO2 BLDA: 52.8 MMHG (ref 75–100)
POTASSIUM SERPL-SCNC: 5.2 MEQ/L (ref 3.5–5.1)
POTASSIUM SERPL-SCNC: 5.5 MEQ/L (ref 3.5–5.1)
PROT SERPL-MCNC: 7.1 GM/DL (ref 6.4–8.2)
PROT UR QL STRIP.AUTO: NEGATIVE MG/DL
RBC # BLD AUTO: 3.64 10^6/UL (ref 4–5.4)
RBC # UR AUTO: 0 /HPF (ref 0–3)
RHEUMATOID FACT SERPL-ACNC: < 10 IU/ML (ref ?–15)
SAO2 % BLDA: 88.1 % (ref 95–99)
SODIUM SERPL-SCNC: 134 MEQ/L (ref 136–145)
SODIUM SERPL-SCNC: 135 MEQ/L (ref 136–145)
SODIUM UR-SCNC: 26 MEQ/L
SP GR UR STRIP.AUTO: 1.01 (ref 1–1.03)
SQUAMOUS #/AREA URNS AUTO: 2 /HPF (ref 0–6)
UROBILINOGEN UR QL STRIP.AUTO: 0.2 MG/DL (ref 0–2)
WBC # BLD AUTO: 5.8 10^3/UL (ref 4–10)
WBC #/AREA URNS AUTO: 0 /HPF (ref 0–3)

## 2021-02-17 RX ADMIN — SODIUM CHLORIDE SCH UNITS: 4.5 INJECTION, SOLUTION INTRAVENOUS at 08:19

## 2021-02-17 RX ADMIN — METOPROLOL SUCCINATE SCH MG: 25 TABLET, EXTENDED RELEASE ORAL at 08:49

## 2021-02-17 RX ADMIN — FLUTICASONE PROPIONATE SCH SPRAY: 50 SPRAY, METERED NASAL at 08:18

## 2021-02-17 RX ADMIN — TRIAMTERENE AND HYDROCHLOROTHIAZIDE SCH EA: 25; 37.5 CAPSULE ORAL at 08:49

## 2021-02-17 RX ADMIN — SODIUM CHLORIDE SCH UNITS: 4.5 INJECTION, SOLUTION INTRAVENOUS at 22:20

## 2021-02-17 NOTE — IPNPDOC
Date Seen


The patient was seen on 2/17/21.





Progress Note


SUBJECTIVE: Patient lying comfortably in bed, without any new complaints at this

time. Patient continues to have mild dyspnea, unchanged. Patient overall feels 

unchanged compare to yesterday.





PHYSICAL EXAMINATION:


VITAL SIGNS: Please see below.


GENERAL: No distress


HEENT: Normocephalic, atraumatic, moist mucous membranes


NECK: Supple


CARDIOVASCULAR EXAMINATION: S1, S2, no murmurs


RESPIRATORY EXAMINATION: Scattered rhonchi, left greater than right


ABDOMINAL EXAMINATION: Soft, nontender, nondistended, positive bowel sounds


EXTREMITIES: Range of motion intact


SKIN: No rash


NEUROLOGICAL EXAMINATION: Alert and oriented 3, no focal deficits 


PSYCHIATRIC EXAMINATION: Calm and cooperative





LABORATORY DATA, IMAGING STUDIES, MICROBIOLOGY: Please see below.





PLAN:


1. Hypoxemic respiratory failure.


 Worsening hypoxemia, dyspnea and CAT scan findings


 CTA negative for PE


 Low suspicion for active infectious process, inflammatory etiology is more 

likely. Serological workup pending.


 Possible chronic silent aspiration


 Pro-calcitonin negative. Respiratory viral panel negative. Blood cultures 

pending. 


 Pulmonary recommendations appreciated, started on high-dose oral steroids.


 ABG noted, has chronic CO2 retention, likely due to restrictive lung disease 

from kyphosis/obesity/large hiatal hernia. Compensated at this time, will 

monitor.





2. Hypertension.


 Continue metoprolol, triamterene and hydrochlorothiazide





DVT prophylaxis: Heparin subcutaneous


GI prophylaxis: Not needed





VS, I&O, [facility]


Vital Signs/I&O





Vital Signs








  Date Time  Temp Pulse Resp B/P (MAP) Pulse Ox O2 Delivery O2 Flow Rate FiO2


 


2/17/21 12:00 98.1 83 20 133/78 (96) 92 Nasal Cannula 6.0 


 


2/16/21 19:55        92














I&O- Last 24 Hours up to 6 AM 


 


 2/17/21





 06:00


 


Intake Total 1065 ml


 


Output Total 1000 ml


 


Balance 65 ml











Laboratory Data


24H LABS


Laboratory Tests 2


2/16/21 21:08: 


Magnesium Level 1.9, Total Creatine Kinase 56, Creatine Kinase MB 2.1, Creatine 

Kinase MB Relative Index 3.75, Troponin I < 0.02


2/17/21 05:36: 


Magnesium Level 2.2, Nucleated Red Blood Cells % (auto) 0.0, Anion Gap 3L, 

Glomerular Filtration Rate 44.5, Calcium Level 9.3, Total Bilirubin 0.3, 

Aspartate Amino Transf (AST/SGOT) 19, Alanine Aminotransferase (ALT/SGPT) 7L, 

Alkaline Phosphatase 71, Total Protein 7.1, Albumin 2.7L, Albumin/Globulin Ratio

0.6L, Rheumatoid Factor < 10.0


2/17/21 08:30: 


Blood Gas Bicarbonate Standard 33.2H, Arterial Blood pH 7.343L, Arterial Blood 

Partial Pressure CO2 71.5*H, Arterial Blood Partial Pressure O2 52.8L, Arterial 

Blood Total CO2 40.2H, Arterial Blood HCO3 38.0H, Arterial Blood Base Excess 

9.7H, Arterial Blood Oxygen Saturation 88.1L


2/17/21 13:51: 


Anion Gap 5L, Glomerular Filtration Rate 39.0, Calcium Level 9.9


CBC/BMP


Laboratory Tests


2/17/21 05:36








2/17/21 13:51








Microbiology





Microbiology


2/16/21 Respiratory Virus Panel (PCR) ([facility]) - Final, Complete


          


2/16/21 Blood Culture - Preliminary, Resulted


          No growth after 24 hours . All specim...


2/16/21 Blood Culture - Preliminary, Resulted


          No growth after 24 hours . All specim...











GONDAL,KHUBAIB N. MD           Feb 17, 2021 16:21

## 2021-02-18 VITALS — SYSTOLIC BLOOD PRESSURE: 113 MMHG | DIASTOLIC BLOOD PRESSURE: 76 MMHG

## 2021-02-18 VITALS — SYSTOLIC BLOOD PRESSURE: 124 MMHG | DIASTOLIC BLOOD PRESSURE: 67 MMHG

## 2021-02-18 VITALS — DIASTOLIC BLOOD PRESSURE: 61 MMHG | SYSTOLIC BLOOD PRESSURE: 100 MMHG

## 2021-02-18 LAB
BUN SERPL-MCNC: 25 MG/DL (ref 7–18)
CALCIUM SERPL-MCNC: 9.5 MG/DL (ref 8.8–10.2)
CHLORIDE SERPL-SCNC: 96 MEQ/L (ref 98–107)
CO2 SERPL-SCNC: 37 MEQ/L (ref 21–32)
CREAT SERPL-MCNC: 1.29 MG/DL (ref 0.55–1.3)
GFR SERPL CREATININE-BSD FRML MDRD: 42.9 ML/MIN/{1.73_M2} (ref 39–?)
GLUCOSE SERPL-MCNC: 80 MG/DL (ref 70–100)
HCT VFR BLD AUTO: 35.5 % (ref 36–47)
HGB BLD-MCNC: 11 G/DL (ref 12–15.5)
MAGNESIUM SERPL-MCNC: 2 MG/DL (ref 1.8–2.4)
MCH RBC QN AUTO: 30.7 PG (ref 27–33)
MCHC RBC AUTO-ENTMCNC: 31 G/DL (ref 32–36.5)
MCV RBC AUTO: 99.2 FL (ref 80–96)
PHOSPHATE SERPL-MCNC: 3.4 MG/DL (ref 2.5–4.9)
PLATELET # BLD AUTO: 475 10^3/UL (ref 150–450)
POTASSIUM SERPL-SCNC: 4.4 MEQ/L (ref 3.5–5.1)
RBC # BLD AUTO: 3.58 10^6/UL (ref 4–5.4)
SODIUM SERPL-SCNC: 137 MEQ/L (ref 136–145)
WBC # BLD AUTO: 9.4 10^3/UL (ref 4–10)

## 2021-02-18 RX ADMIN — TRIAMTERENE AND HYDROCHLOROTHIAZIDE SCH EA: 25; 37.5 CAPSULE ORAL at 09:08

## 2021-02-18 RX ADMIN — SODIUM CHLORIDE SCH UNITS: 4.5 INJECTION, SOLUTION INTRAVENOUS at 21:17

## 2021-02-18 RX ADMIN — FLUTICASONE PROPIONATE SCH SPRAY: 50 SPRAY, METERED NASAL at 09:19

## 2021-02-18 RX ADMIN — METOPROLOL SUCCINATE SCH MG: 25 TABLET, EXTENDED RELEASE ORAL at 09:54

## 2021-02-18 RX ADMIN — SODIUM CHLORIDE SCH UNITS: 4.5 INJECTION, SOLUTION INTRAVENOUS at 09:08

## 2021-02-18 NOTE — IPN
PROGRESS NOTE



DATE:  02/18/2021



SUBJECTIVE: Overnight the patient states this is the first day she has felt

better. Her shortness of breath has decreased but still remains. With

ambulation she does desaturate pretty quickly but recovers fairly quickly. She

has had a cough, today she had some clear mucous, no hemoptysis. No chest

discomfort. She denies any fever or chills. She states she is eating well.



PHYSICAL EXAMINATION:

VITAL SIGNS: Temperature is 97.2, pulse is 86, respiratory rate is 19, blood

pressure is 100/61. MAP of 74. Oxygen saturation was 97% on 3.0 liters. 

GENERAL: Awake, alert and oriented. Affect and mood are appropriate. She

appears less dyspneic with conversation. 

HEENT: Sclera are clear. Nonicteric. Pupils equal and reactive to light. Mucous

membranes are moist without lesions. Tongue is midline. Oropharynx is crowded

without erythema or exudate. Mallampati 4 airway.

NECK: Supple. No tracheal deviation.

LYMPH: No cervical, supraclavicular or axillary adenopathy.

CARDIAC: Distant S1 without audible murmur, rub or gallop. No elevated JVP. No

peripheral edema. 

PULMONARY: Bilateral rales two-thirds of the lower lung base. No dullness to

percussion. No rhonchi or wheeze. 

ABDOMEN: Obese, soft, nontender and nondistended. No hepatosplenomegaly or

hernia.

EXTREMITIES: No lower extremity edema. No cyanosis or clubbing. 

SKIN: Pale without rash, jaundice, or bruising.



LABORATORY DATA:  White blood cell count 9.4, hemoglobin 11.0, hematocrit 35.5,

platelet count 475,000. Chemistries: Sodium is 137, potassium is 4.4,

bicarbonate is 37, BUN 25, creatinine of 1.29. No new arterial blood gas. 



IMPRESSION: 

  1.  Hypoxia status post COVID pneumonia with concern for underlying

      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 Laboratory evaluation

      is still pending. Rheumatoid factor is unremarkable but anti-CCP, SMITH,

      ALISIA, scleroderma and anti-double stranded DNA are all pending.  I have

      added an anti-RNP as this has not been performed. 

  2.  Chronic hypoventilation, suspect underlying obesity hypoventilation with

      restrictive impairment from large hiatal hernia and thoracic kyphosis.

      Will need sleep study as an outpatient. Patient plans on following up

      with our office to do so. Will continue to follow.



Thank you for this consultation.

## 2021-02-19 VITALS — DIASTOLIC BLOOD PRESSURE: 66 MMHG | SYSTOLIC BLOOD PRESSURE: 117 MMHG

## 2021-02-19 VITALS — SYSTOLIC BLOOD PRESSURE: 121 MMHG | DIASTOLIC BLOOD PRESSURE: 81 MMHG

## 2021-02-19 LAB — NT-PRO BNP: 649 PG/ML (ref ?–450)

## 2021-02-19 RX ADMIN — METOPROLOL SUCCINATE SCH MG: 25 TABLET, EXTENDED RELEASE ORAL at 08:54

## 2021-02-19 RX ADMIN — SODIUM CHLORIDE SCH UNITS: 4.5 INJECTION, SOLUTION INTRAVENOUS at 08:54

## 2021-02-19 RX ADMIN — FLUTICASONE PROPIONATE SCH SPRAY: 50 SPRAY, METERED NASAL at 08:54

## 2021-02-19 RX ADMIN — TRIAMTERENE AND HYDROCHLOROTHIAZIDE SCH EA: 25; 37.5 CAPSULE ORAL at 08:54

## 2021-02-19 NOTE — DS.PDOC
Discharge Summary


General


Date of Admission


Feb 16, 2021 at 16:48


Date of Discharge


02/19/2021


Attending Physician:  GONDAL,KHUBAIB N. MD





Discharge Summary


PROCEDURES PERFORMED DURING STAY: None.





ADMITTING DIAGNOSES: 


1. Hypoxemic respiratory failure, pulmonary fibrosis.





DISCHARGE DIAGNOSES:


1. Hypoxemic respiratory failure, pulmonary fibrosis.





COMPLICATIONS/CHIEF COMPLAINT: Respiratory Failure With Hypoxia.





HISTORY OF PRESENT ILLNESS: 76-year-old female with past medical history of 

hypertension and cold. It was admitted for hypoxemic respiratory failure. Based 

on her CAT scan findings, it appears that she has chronic fibrosis of her lungs.

Etiology remains unclear, although there are multiple possibilities including 

chronic aspiration from large hiatal hernia dating to fibrosis. Patient was 

evaluated by pulmonary who recommended oral steroids, patient with significant 

improvement after 48 hours. She is currently back to her baseline state of 

health and oxygen requirements. She has been evaluated and cleared for discharge

by physical therapy. She reports a mild cough with scant amount of hemoptysis 

and will be discharged on oral antibiotics to cover for possible infection as 

per pulmonary's recommendations. Patient will be discharged on Augmentin for 7 

days along with a slow steroid taper. Patient is to follow-up with Dr. Goldsmith 

within 1-2 weeks of discharge and her PCP. Patient understands and agrees with 

this plan at this time.





HOSPITAL COURSE: As above. 





DISCHARGE MEDICATIONS: Please see below.


 


ALLERGIES: Please see below.





PHYSICAL EXAMINATION:


VITAL SIGNS: Please see below.


GENERAL: No distress


HEENT: Normocephalic, atraumatic, moist mucous membranes


NECK: Supple


CARDIOVASCULAR EXAMINATION: S1, S2, no murmurs


RESPIRATORY EXAMINATION: Bilateral rhonchi, right greater than left, no wheezing


ABDOMINAL EXAMINATION: Soft, nontender, nondistended, positive bowel sounds


EXTREMITIES: Range of motion intact


SKIN: No rash


NEUROLOGICAL EXAMINATION: Alert and oriented 3, no focal deficits 


PSYCHIATRIC EXAMINATION: Calm and cooperative





LABORATORY DATA: Please see below.





IMAGING: CT chest with bilateral pulmonary fibrosis, right greater than left, 

more prominent in the bases





PROGNOSIS: Guarded





ACTIVITY: As tolerated.





DIET: Cardiac





DISCHARGE PLAN: Follow-up with pulmonary and PCP in 1-2 weeks





DISPOSITION: Home with services.





DISCHARGE INSTRUCTIONS:


1. As above





DISCHARGE CONDITION: Stable.





TIME SPENT ON DISCHARGE: Greater than 20 minutes.





Vital Signs/I&Os





Vital Signs








  Date Time  Temp Pulse Resp B/P (MAP) Pulse Ox O2 Delivery O2 Flow Rate FiO2


 


2/19/21 09:00       3.0 


 


2/19/21 08:54  78  121/81    


 


2/19/21 06:00 97.6  19  92 Nasal Cannula  


 


2/16/21 19:55        92














I&O- Last 24 Hours up to 6 AM 


 


 2/19/21





 06:00


 


Intake Total 1420 ml


 


Output Total 2475 ml


 


Balance -1055 ml











Microbiology





Microbiology


2/16/21 Respiratory Virus Panel (PCR) ([facility]) - Final, Complete


          


2/16/21 Blood Culture - Preliminary, Resulted


          No Growth after 48 hours. All Specime...


2/16/21 Blood Culture - Preliminary, Resulted


          No Growth after 72 hours. All specime...





Discharge Medications


Scheduled


Amoxicillin/Potassium Clav (Augmentin 500-125 Tablet) 1 Each Tablet, 1 TAB PO 

BID


Cetirizine HCl (Cetirizine HCl) 10 Mg Tablet, 10 MG PO QHS, (Reported)


Fluticasone Propionate (Flonase Allergy Relief) 9.9 Ml [place].susp, 2 SPRAY NARES

DAILY, (Reported)


Metoprolol Succinate (Metoprolol Succinate) 25 Mg Tab.er.24h, 25 MG PO DAILY, 

(Reported)


Prednisone (Prednisone) 10 Mg Tablet, 1 TAB PO ASDIRECTED


   Take 4 tabs daily x4 days then 3 tabs daily x4 days then 2 tabs daily x4 days

then 1 tab daily x4 days. 


Triamterene/Hydrochlorothiazid (Triamterene-Hctz 37.5-25 mg Cp) 1 Each Capsule, 

1 CAP PO DAILY, (Reported)





Scheduled PRN


Albuterol Sulf (Albuterol Sulfate) 2.5 Mg/3 Ml Vial.neb, 1 VIAL NEB Q4HP PRN for

wheezing, (Reported)


Albuterol Sulfate (Ventolin Hfa) 18 Gm Hfa.aer.ad, 2 PUFF INH Q4-6HP PRN for wh

eezing





Allergies


Coded Allergies:  


     aspirin (Verified  Adverse Reaction, Unknown, ulcers, 1/22/20)











GONDAL,KHUBAIB N. MD           Feb 19, 2021 12:40

## 2021-02-19 NOTE — IPN
Attending, Co-signing Physician: Dr. Daija Grey M.D.



                          PULMONARY PROGRESS NOTE



DATE:  02/19/2021



SUBJECTIVE: Makdea was seen and examined this morning by the Pulmonary Service

while lying upright in bed. She has now been on 3 liters nasal cannula

supplemental oxygen for roughly 24 hours and has maintained her saturations.

She does still continue to desaturate whenever ambulating to and from the

bathroom or when coughing. She has been coughing a bit more frequently over the 
past 36-48 hours and did bring up

some blood-tinged sputum yesterday.  She continues to eat and drink

without any issues. She has not had a bowel movement for the last 24 hours but

had been having them pretty steadily prior to that. She continues to make good

amounts of urine. She denies any significant pain at this time, specifically

including chest pain or pain with breathing. She also denies any current or 
overnight fevers, chills, or night sweats. She reports no increased work of

breathing or shortness of breath while receiving the supplemental oxygen. She 
denies any chest discomfort.



PHYSICAL EXAMINATION:

VITAL SIGNS: Temperature is 97.6, heart rate 72, respiratory rate 19, blood

pressure 117/66. Oxygen saturation 92% while receiving 3 liters of supplemental

oxygen via nasal cannula. 

GENERAL: Pleasant elderly  female lying upright in bed. She is awake,

alert and oriented x3 on supplemental oxygen nasal cannula. She does actually

seem to be less short of breath with conversation than on previous day exams. 

HEENT: Normocephalic, atraumatic. Non-injected, anicteric sclera. She is

wearing eyeglasses. Pupils are equal, round and reactive to light and

accommodation. Mucous membranes are moist. There is no pharyngeal erythema or

exudate. Mallampati airway score of 4.

NECK: Supple but quite wide and obese. No lymphadenopathy appreciated. Trachea

is midline.

CARDIOVASCULAR: Regular rate and right rhythm. There is a question murmur

located over the right upper sternal border, otherwise no rubs or gallops are

appreciated. Difficult to assess for elevated JVP due to neck habitus. 2+

radial pulses bilaterally. 

RESPIRATORY:  There continued to be diffuse bilateral crackles from the middle

part of each lung moving inferiorly down into the bases with the amount of

crackles slightly greater on the right than the left. There is no dullness to

percussion. No wheezes or rhonchi are appreciated. She is breathing with the

assistance of supplemental oxygen 3 liters nasal cannula. Conversational

dyspnea is improved over exam a few days ago. Symmetric chest expansion. 

ABDOMEN:  Obese, soft, nondistended and nontender. There is no rigidity

appreciated. Normoactive bowel sounds throughout. Difficult to assess for

hepatosplenomegaly secondary to habitus. 

EXTREMITIES:  There is bilateral lower extremity non-pitting swelling that has

relatively been consistent over the past few days. There is some slight

discomfort with palpation of the tibia bilaterally. 2+ radial pulses

bilaterally. No signs of clubbing or cyanosis. 

NEUROLOGIC: Awake, alert and oriented x3. No focal deficits appreciated.

Non-dysarthric speech. 



LABORATORY DATA: A CBC or CMP nor blood gas was done today; 24 hour input

positive 1420, 24 hour output negative 2275 with a net negative of 855. 



Blood cultures from Tuesday, 02/16/2021, both have no growth after 48 hours. 
Respiratory viral panel from February 16th negative. Other than a negative

rheumatoid factor, all remaining connective tissue/autoimmune/rheumatological

immunologic labs are still pending at this time. 



There is no new imaging to review today. 



IMPRESSION AND PLAN:

  1.  Acute on chronic hypoxic respiratory failure. This is most likely the

      result of multiple components: A flare of undiagnosed underlying

      intersitial lung disease, a component of possible aspiration pneumonia as

      well as questionable pulmonary edema. As far as the intersitial lung

      disease, most of the labs are still pending. She did

      have a history of migratory inflammatory polyarthritis and this may be

      factoring into some scattered fibrosis that we had seen on imaging dating

      back to her hospitalization 13 months ago in January 2020. In terms of the

      aspiration pneumonia, she did report an increase in her cough over the

      past 24 hours as well as some scant hemoptysis yesterday. From an 
oxygenation monitoring standpoint, the biggest obstacle will be

      weaning her upon ambulation. We have discussed this with her primary 
physician here

      in the hospital, Dr. Gondal. Physical Therapy has been asked to

      ambulate her and see what her oxygen demand is with that. Overall, she

      does report feeling a bit better since the initiation of steroids two

      days ago and we also discussed with Dr. Gondal the need for a steroid

      taper upon eventual discharge. Upon review of imaging both on this

      admission and the one in January 2020, there is some scattered

      fibrosis in the lungs. There is now potential for acute inflammation on 
top of the chronic scarring. In terms of her recent COVID

      pneumonia diagnosis from last month, this does not appear to be related to
that; rather, it's more like the result of the aforementioned etiologies. During
the month since hospital discharge, she was on 3 L O2 at home. She is now back 
to that baseline 3

      liters. Of note, she reports not being on home oxygen

      prior to the discharge last month. 

  2.  Chronic compensated hypercarbia, this is most likely also due to multiple

      factors: namely, obstructive sleep apnea/obesity hypoventilation syndrome,
a restrictive lung disease due to the fibrosis noted on imaging, cardiomegaly, a
significant hiatal hernia, thoracic

      kyphosis, and the potential aforementioned underlying undiagnosed

      interstitial lung disease. Her initial ABG 2 days ago showed compensation 
with very elevated CO2. She will

      require follow-up upon discharge with both Pulmonary and Sleep

      Medicine to test for sleep apnea and possible need of nocturnal

      oxygen/pressure support. 

  3.  Presumed pulmonary hypertension. Upon review of imaging from January 2020,
she has a

      significantly enlarged right ventricle. Imaging also did show a 
significantly

      enlarged pulmonary artery, as well as some reflux of IV contrast. These 
findings are indicative of increased pressure, and are likely a manifestation of
fibrosis. At this

      point, it is felt that the pulmonary hypertension is most likely a

      combination of Group 2 pulmonary hypertension associated with left sided

      heart disease, as well as a component of Group 3 pulmonary hypertension

      due to the long-term hypoxia from the etiology discussed

      above. She did report today recently having outpatient workup with

      Cardiology (believed to be done with Dr. Yudy Grimaldo). Upon discharge, 
it will be prudent

      for her to follow-up with her PCP and obtain records of said cardiology 
work-up.

  4.  Right sided diastolic heart failure. As mentioned above, there are likely 
HFpEF indicators from recent outpatient cardiology work-up. What is

      important for her is that she remain either net neutral or

      net negative for Is & Os. She did not appear to be too fluid overloaded on
exam today,

      and is likely near her baseline. Therefore, it does not seem as

      though diuretics would be warranted at this time.

  5.  Possible aspiration pneumonia. As discussed above, she did report increase
in cough over the past 24 hours with some scant hemoptysis. We did

      discussed this with Dr. Gondal, and he will

      likely start her on a short course of antibiotics to provide coverage for 
possible aspiration pneumonia. This is likely a manifestation

      of her significant hiatal hernia. 

TOBY

## 2021-02-19 NOTE — IPN
PULMONARY PROGRESS NOTE



DATE:  02/17/2021



Dr. Afshin Saucedo Resident Physician dictating for Attending Physician Dr. Margarito Goldsmith. 



SUBJECTIVE: Makeda was seen and examined this morning by the pulmonology service

at the bedside. Per report from nursing, she desaturates to the 70s and becomes 
tachypneic when

ambulating to and from the bathroom. It takes approximately 2 minutes to have

saturations return back to the low to mid 90s. Other than with ambulation, she 
has stayed at 6 liters of supplemental

oxygen throughout the overnight and this morning. She is eating and

drinking without any issues and is having bowel movements.



While she reports some increased work of breathing, this has been relatively 
consistent

over the past few days. She also reports a non-productive cough.



She denies any current or overnight fever, chills or night sweats. She denies 
any chest pain, chest

pressure or palpitations as well either currently or overnight. She denies any 
significant pain this

morning upon exam. 



OBJECTIVE: Vital signs: Temperature 98, heart rate 81, respiratory rate 22,

blood pressure 120/81, saturating at 93% while receiving 6 liters of nasal

cannula supplemental oxygen. 

General: Very pleasant, elderly , obese female lying upright in bed.

Does not appear to be in any acute distress. Alert and oriented times 3 on

supplemental nasal cannula oxygen

HEENT: Normocephalic, atraumatic. Non-injected, anicteric sclerae, mild

conjunctiva pallor, pupils are equal, round and reactive to light and

accommodation. Nasal cannula in place. Mucous membranes appear more

moist today than yesterday. There is no pharyngeal erythema or exudate. 

Neck: Supple, wide and thick. No cervical or supraclavicular lymphadenopathy is

appreciated. Difficult to assess for JVD due to habitus and neck thickness.

Cardiovascular: Regular rate, regular rhythm, normal S1 and S2. The S1 is

prominent. No murmurs or rubs are appreciated. 2+ radial pulses palpated b/l.

Respiratory: As stated, she is on supplemental oxygen via nasal cannula. No

accessory muscle use is seen. Symmetric chest expansion. There are diffuse rales

bilaterally on the posterior aspect of each lung from approximately the mid

lung moving inferiorly all the way down to the bases. It seems as though the

rales begin a bit more superior on the right side versus the left, and are

prominent with both inspiration and expiration. There is decreased tidal volume.

She is speaking full sentences.

Gastrointestinal: Soft, obese, nontender and nondistended. There are hypoactive

bowel sounds throughout. No rigidity is appreciated.

Extremities: There is bilateral lower extremity swelling, but no pitting edema

is appreciated. No signs of clubbing or cyanosis. There is some slight

arthritic visual changes seen at bilateral DIP joints as well as some chronic

thickening of MCP joints second and third bilaterally. Two plus radial pulses

bilaterally. 

Neurologic: Patient is awake, alert and oriented times 3. No focal deficits are

appreciated. Non-dysarthric speech. Responding appropriately to all questions

and commands. 



LABORATORY DATA: WBC 5.8, hemoglobin 11, MCV 99.7, platelet count 463,000.

Sodium 134, potassium 5.5, chloride 95, bicarb 36, BUN 16, creatinine 1.25 with

a calculated GFR of 44.5% and serum glucose is 126. serum calcium is 9.3. Serum

magnesium this morning is 2.2. AST 19, ALT 7, total protein 7.1, albumin 2.7.

PTT 40.6, fibrinogen 641, D-dimer 2064. ABG done this morning showed results of

pH 7.34, PCO2 71.5, PO2 52.8 and again note this was done on supplemental

oxygen of 6 liters. 



At this point, multiple rheumatological lab values have been ordered and the

only one to result is a negative rheumatoid factor (RF). 



IMAGING: No new imaging today on the 17th. She had both a CTA and chest x-ray

yesterday (February 16th), with findings dictated in yesterday's Pulmonary

Consultation Note from Dr. Goldsmith. 



IMPRESSION AND PLAN: 

1.   Acute on chronic hypoxic respiratory failure: This is likely inflammatory

     in nature. At this point, it is unclear if this is from COVID sequelae (dx 
last month) and/or interstitial lung

     disease. An infection cannot be ruled out, but patient

     continues to show no signs of infection at this time - - she continues to

     be afebrile and denies any subjective fevers, chills, night sweats. In 
addition, her cough is a dry one. On labs, she has a

     normal white count, and has actually decreased today from

     yesterday. She has multiple inflammatory lab markers that are

     elevated, pointing towards an inflammatory process (platelets, D-dimer,

     fibrinogen). At this stage, we will continue to monitor the patient for any

     signs of infection as she is continuing to receive steroids. We anticipate

     monitoring for infection for at least a few more days. Should no signs of 
infection ultimately arise, we'll likely discharge the patient with home oxygen.
Plan will then be to follow up as an outpatient for polysomnography to

     appropriately address her hypoventilation. As

     far as the inflammatory component to her condition, there is clinical

     suspicion for a potential underlying rheumatological cause manifesting as

     respiratory fibrosis seen on imaging. To this point, the only resulting

     lab from a rheumatological standpoint was a negative RF. She did recently

     have COVID pneumonia last month and sometimes viral

     conditions can exacerbate underlying rheumatological conditions. She also

     has a history of migratory inflammatory polyarthritis raising further 
suspicion. 



2.   Chronic hypercarbic respiratory failure most likely secondary to a

     restrictive process from structural

     abnormalities in the form of a significant hiatal hernia, thoracic 
kyphosis, and cardiomegaly. On yesterday's chest CT, there was evidence of 
decreased

     lung volume, likely because of the aforementioned structural abnormalities.
In

     addition, an ABG this morning showed significant CO2 retention, but this 
appears to be

     compensated as her pH was only mildly acidotic - - this would seem to point
to the

     patient being hypoventilatory at baseline, in addition to the structural

     abnormalities mentioned before. Patient is also obese and that is likely

     factoring in as well. Upon discharge, we anticipate patient following up as

     an outpatient with the pulmonary service to undergo polysomnography in

     order to get home positive pressure device. 



3.   Thrombocytosis: While still elevated, platelet count actually is actually 
decreased from yesterday. This most likely represents an

     acute phase reaction to the presumed inflammatory process.

TOBY

## 2021-02-19 NOTE — IPNPDOC
Date Seen


The patient was seen on 2/18/21.





Progress Note


SUBJECTIVE: Patient lying comfortably in bed, reports improvement in dyspnea 

compared to yesterday, no new complaints today.





PHYSICAL EXAMINATION:


VITAL SIGNS: Please see below.


GENERAL: No distress


HEENT: Normocephalic, atraumatic, moist mucous membranes


NECK: Supple


CARDIOVASCULAR EXAMINATION: S1, S2, no murmurs


RESPIRATORY EXAMINATION: Scattered rhonchi, left greater than right


ABDOMINAL EXAMINATION: Soft, nontender, nondistended, positive bowel sounds


EXTREMITIES: Range of motion intact


SKIN: No rash


NEUROLOGICAL EXAMINATION: Alert and oriented 3, no focal deficits 


PSYCHIATRIC EXAMINATION: Calm and cooperative





LABORATORY DATA, IMAGING STUDIES, MICROBIOLOGY: Please see below.





PLAN:


1. Hypoxemic respiratory failure.


 Worsening hypoxemia, dyspnea and CAT scan findings


 CTA negative for PE


 Low suspicion for active infectious process, inflammatory etiology is more 

likely, improving with steroids.


 Likely chronic silent aspiration which may have led to pulmonary fibrosis.


 Pro-calcitonin negative. Respiratory viral panel negative. Blood cultures 

negative.


 Pulmonary recommendations appreciated, remains on prednisone 40 mg daily.





2. Hypertension.


 Continue metoprolol, triamterene and hydrochlorothiazide





DVT prophylaxis: Heparin subcutaneous


GI prophylaxis: Not needed





VS, I&O, 24H, Fishbone


Vital Signs/I&O





Vital Signs








  Date Time  Temp Pulse Resp B/P (MAP) Pulse Ox O2 Delivery O2 Flow Rate FiO2


 


2/19/21 09:00       3.0 


 


2/19/21 08:54  78  121/81    


 


2/19/21 06:00 97.6  19  92 Nasal Cannula  


 


2/16/21 19:55        92














I&O- Last 24 Hours up to 6 AM 


 


 2/19/21





 06:00


 


Intake Total 1420 ml


 


Output Total 2475 ml


 


Balance -1055 ml











Laboratory Data


Microbiology





Microbiology


2/16/21 Respiratory Virus Panel (PCR) ([facility]) - Final, Complete


          


2/16/21 Blood Culture - Preliminary, Resulted


          No Growth after 72 hours. All specime...


2/16/21 Blood Culture - Preliminary, Resulted


          No Growth after 72 hours. All specime...











GONDAL,KHUBAIB N. MD           Feb 19, 2021 13:22

## 2021-02-20 LAB
ENA RNP AB SER-ACNC: < 0.2 AI (ref 0–0.9)
ENA SM AB SER-ACNC: < 0.2 AI (ref 0–0.9)

## 2021-03-12 ENCOUNTER — HOSPITAL ENCOUNTER (OUTPATIENT)
Dept: HOSPITAL 53 - M SLEEP | Age: 76
End: 2021-03-12
Attending: INTERNAL MEDICINE
Payer: MEDICARE

## 2021-03-12 DIAGNOSIS — G47.33: Primary | ICD-10-CM

## 2021-03-15 NOTE — SLEEPCENT
NOCTURNAL POLYSOMNOGRAPHY

 

DATE: 03/12/2021



ORDERED BY: Margarito Goldsmith D.O. 



Nocturnal polysomnography was performed evaluation of sleep physiology in this

patient with a history of excessive somnolence. 



7 hours and 26 minutes of data were reviewed. There were 392.5 minutes of sleep

identified. Sleep latency was short at 8.5 minutes. REM latency was prolonged

at 200 minutes. Sleep architecture showed fragmentation and poor progression.

Two REM cycles were appreciated. Overall sleep efficiency was 89.5%. The

electrocardiogram showed a sinus rhythm with an average heart rate of 80 beats

per minute. EEG showed fairly normal waveforms for wake and sleep. Testing was

performed on supplemental oxygen and despite supplemental oxygen; there were

261 respiratory events identified of 10 seconds in duration or greater for an

apnea-hypopnea index of 39.9. The events were not exclusive to sleep stage nor

body posture. Arousals from respiratory events occurred 23.2 times per hour,

and oxygen desaturations despite supplemental oxygen were seen into the 70s.

There was some minor limb activity. Limb movement arousal index was 0.3. 



IMPRESSION:

Severe Obstructive sleep apnea syndrome (G47.33), apnea-hypopnea index 39.9. 



RECOMMENDATION:

The patient should be encouraged to return to the [facility] for

pressure therapy. In the interim, alcohol and sedative avoidance should be

practiced and caution exercised during the operation of motor vehicles.

## 2021-03-23 ENCOUNTER — HOSPITAL ENCOUNTER (OUTPATIENT)
Dept: HOSPITAL 53 - M SLEEP | Age: 76
End: 2021-03-23
Attending: PHYSICIAN ASSISTANT
Payer: MEDICARE

## 2021-03-23 DIAGNOSIS — G47.33: Primary | ICD-10-CM

## 2021-03-24 NOTE — SLEEPCENT
NOCTURNAL POLYSOMNOGRAPHY CPAP TITRATION



DATE: 03/23/2021



ORDERED BY: EDWARD Giles 



Nocturnal polysomnography was performed for the titration of pressure therapy

in this patient with obstructive sleep apnea syndrome with apnea-hypopnea index

of 39.9. 



For testing, a ResMed AirFit F20 full face mask of small size was used, 5 cm of

water pressure were applied to the circuit, and the lights were extinguished. 



7 hours and 41 minutes of data were reviewed. There were 418.5 minutes of sleep

identified. Sleep latency was short at 3 minutes. REM latency was short at 8

minutes. Sleep architecture was good with six REM cycles. Overall sleep

efficiency was 92.2%. The electrocardiogram showed a sinus rhythm with an

average heart rate of 88 beats per minute. EEG showed reasonably normal

waveforms for wake and sleep. Persistence of obstructive respiratory events

prompted an increase in CPAP pressure to 19. Despite optimal CPAP pressure,

hypoventilatory oxygen desaturations were seen prompting the addition of

supplemental oxygen. Best sleep was seen on a CPAP pressure of 19. Supplemental

oxygen was necessary to address hypoventilatory desaturations. There was also

significant activity in the limb leads on this study; however, the limb

movement arousal index was 2.6.  



IMPRESSION:

1.  Severe obstructive sleep apnea syndrome (G47.33). 

2.  Hypoventilatory oxygen desaturations. 



RECOMMENDATION:

Nightly use of pressure therapy at 19 cm of water will be sufficient to

overcome the obstructive respiratory events. Supplemental oxygen at 3 L/min

will be necessary to address the hypoventilatory oxygen desaturations.

## 2021-04-13 ENCOUNTER — HOSPITAL ENCOUNTER (OUTPATIENT)
Dept: HOSPITAL 53 - M LAB | Age: 76
End: 2021-04-13
Attending: NURSE PRACTITIONER
Payer: MEDICARE

## 2021-04-13 DIAGNOSIS — I50.23: Primary | ICD-10-CM

## 2021-04-13 LAB
ALBUMIN SERPL BCG-MCNC: 3.3 GM/DL (ref 3.2–5.2)
ALT SERPL W P-5'-P-CCNC: 9 U/L (ref 12–78)
BILIRUB SERPL-MCNC: 0.5 MG/DL (ref 0.2–1)
BUN SERPL-MCNC: 17 MG/DL (ref 7–18)
CALCIUM SERPL-MCNC: 8.8 MG/DL (ref 8.8–10.2)
CHLORIDE SERPL-SCNC: 101 MEQ/L (ref 98–107)
CO2 SERPL-SCNC: 33 MEQ/L (ref 21–32)
CREAT SERPL-MCNC: 1.24 MG/DL (ref 0.55–1.3)
GFR SERPL CREATININE-BSD FRML MDRD: 44.9 ML/MIN/{1.73_M2} (ref 39–?)
GLUCOSE SERPL-MCNC: 129 MG/DL (ref 70–100)
NT-PRO BNP: 561 PG/ML (ref ?–450)
POTASSIUM SERPL-SCNC: 4.2 MEQ/L (ref 3.5–5.1)
PROT SERPL-MCNC: 7.2 GM/DL (ref 6.4–8.2)
SODIUM SERPL-SCNC: 140 MEQ/L (ref 136–145)

## 2021-04-13 NOTE — REP
INDICATION:

ACUTE ON CHRONIC SYSTOLIC (CONGESTIVE) HEART FAILURE



COMPARISON:

02/16/2021 as well as other prior exams.



TECHNIQUE:

PA/Lateral



FINDINGS:

The previously noted right lung infiltrates have improved but there are diffuse

residual infiltrates still present in the right lung.  Chronic fibro atelectatic

change in the left lung base is unchanged.  Very large hiatal hernia is again noted.

The heart and mediastinum appear unchanged.  There are degenerative changes of the

spine.



IMPRESSION:

Moderate diffuse right lung infiltrates are still present, improved since the prior

study of 02/16/2021.





<Electronically signed by Hair Schilling > 04/13/21 [id number]

## 2021-05-03 ENCOUNTER — HOSPITAL ENCOUNTER (OUTPATIENT)
Dept: HOSPITAL 53 - M RAD | Age: 76
End: 2021-05-03
Attending: INTERNAL MEDICINE
Payer: MEDICARE

## 2021-05-03 DIAGNOSIS — J96.12: Primary | ICD-10-CM

## 2021-05-03 NOTE — REP
INDICATION:

CHRONIC RESPIRATORY FAILURE.



COMPARISON:

02/16/2021 as well as other prior exams.



TECHNIQUE:

CT chest performed without the use of intravenous contrast.  Sagittal and coronal

reconstruction images are performed.



FINDINGS:

Lungs: The diffuse bilateral infiltrates seen on the most recent exam have improved

with mild scattered residual infiltrate remaining in areas of both lungs.  There are

underlying chronic fibrotic changes throughout both lungs.

Mediastinum: A right pericarinal lymph node measures 1.5 cm in diameter unchanged.

Few other smaller mediastinal lymph nodes are present, also unchanged.

Melinda: No gross adenopathy.

Axilla: No gross adenopathy.

Pleura: No effusion.

Heart: Heart is mildly enlarged.

Thoracic aorta: No aneurysm.

Upper abdominal structures: There is a very large hiatal hernia.

Visualized osseous structures: There are degenerative changes of the spine without

compression deformity.



IMPRESSION:

The diffuse bilateral infiltrates seen on the most recent exam have improved with mild

scattered residual infiltrate remaining in areas of both lungs.





<Electronically signed by Hair Schilling > 05/03/21 [id number]

## 2021-05-31 ENCOUNTER — HOSPITAL ENCOUNTER (OUTPATIENT)
Dept: HOSPITAL 53 - M SLEEP | Age: 76
End: 2021-05-31
Attending: INTERNAL MEDICINE
Payer: MEDICARE

## 2021-05-31 DIAGNOSIS — G47.33: Primary | ICD-10-CM

## 2021-06-01 NOTE — SLEEPCENT
NOCTURNAL POLYSOMNOGRAPHY

 

DATE: 05/31/2021



ORDERED BY: Margarito Goldsmith D.O. 



Nocturnal polysomnography was performed for retitration of pressure therapy in

this patient with obstructive sleep apnea syndrome.



For testing a ResMed AirFit F20 full face mask of medium size was used, 3

liters of oxygen was initially bled into the circuit and the lights were

extinguished. 



7 hours and 20 minutes of data were reviewed. There were 265.5 minutes of sleep

identified. Sleep latency was normal at 34 minutes. REM latency was short at

7.5 minutes. Sleep architecture improved later in the study and optimal

pressure therapy. There were three REM cycles noted. Overall sleep efficiency

was 61%. The electrocardiogram showed a sinus rhythm with frequent PVCs.

Average heart rate of 70 beats per minute. EEG showed fairly normal waveforms

for wake and sleep. There were no focal events identified. Persistent of

respiratory events prompted an increase in CPAP pressure and despite optimal

mask fit and minimal air leak, the patient required a change to a BiLevel

device to address obstructive enteroscopy in REM. Pressures were escalated and

optimal sleep was seen on a BiLevel device inspiratory pressure/expiratory of

12. 4 liters of oxygen was necessary to be bled through the system to maintain

a borderline saturation. 



IMPRESSION:

Obstructive sleep apnea syndrome (G47.33). 



RECOMMENDATION:

Nightly use of pressure therapy via BiLevel device inspiratory pressure

18/expiratory 12 with 4 liters of oxygen bled in through the system.

## 2021-11-26 ENCOUNTER — HOSPITAL ENCOUNTER (OUTPATIENT)
Dept: HOSPITAL 53 - M LAB | Age: 76
End: 2021-11-26
Attending: NURSE PRACTITIONER
Payer: MEDICARE

## 2021-11-26 DIAGNOSIS — R73.03: Primary | ICD-10-CM

## 2021-11-26 DIAGNOSIS — Z79.899: ICD-10-CM

## 2021-11-26 LAB
ALBUMIN SERPL BCG-MCNC: 3.5 GM/DL (ref 3.2–5.2)
ALT SERPL W P-5'-P-CCNC: 11 U/L (ref 12–78)
BILIRUB SERPL-MCNC: 0.6 MG/DL (ref 0.2–1)
BUN SERPL-MCNC: 20 MG/DL (ref 7–18)
CALCIUM SERPL-MCNC: 9.3 MG/DL (ref 8.8–10.2)
CHLORIDE SERPL-SCNC: 99 MEQ/L (ref 98–107)
CHOLEST SERPL-MCNC: 173 MG/DL (ref ?–200)
CHOLEST/HDLC SERPL: 3.33 {RATIO} (ref ?–5)
CO2 SERPL-SCNC: 34 MEQ/L (ref 21–32)
CREAT SERPL-MCNC: 1.58 MG/DL (ref 0.55–1.3)
CREAT UR-MCNC: 48.6 MG/DL
EST. AVERAGE GLUCOSE BLD GHB EST-MCNC: 111 MG/DL (ref 60–110)
GFR SERPL CREATININE-BSD FRML MDRD: 33.9 ML/MIN/{1.73_M2} (ref 39–?)
GLUCOSE SERPL-MCNC: 101 MG/DL (ref 70–100)
HDLC SERPL-MCNC: 52 MG/DL (ref 40–?)
LDLC SERPL CALC-MCNC: 97 MG/DL (ref ?–100)
MICROALBUMIN UR-MCNC: 9.4 MG/L
MICROALBUMIN/CREAT UR: 19.3 MCG/MG (ref 0–30)
NONHDLC SERPL-MCNC: 121 MG/DL
POTASSIUM SERPL-SCNC: 3.9 MEQ/L (ref 3.5–5.1)
PROT SERPL-MCNC: 7.8 GM/DL (ref 6.4–8.2)
SODIUM SERPL-SCNC: 139 MEQ/L (ref 136–145)
TRIGL SERPL-MCNC: 122 MG/DL (ref ?–150)

## 2022-02-28 ENCOUNTER — HOSPITAL ENCOUNTER (OUTPATIENT)
Dept: HOSPITAL 53 - M LAB | Age: 77
End: 2022-02-28
Attending: NURSE PRACTITIONER
Payer: MEDICARE

## 2022-02-28 DIAGNOSIS — I10: Primary | ICD-10-CM

## 2022-02-28 LAB
BUN SERPL-MCNC: 19 MG/DL (ref 7–18)
CALCIUM SERPL-MCNC: 9.8 MG/DL (ref 8.8–10.2)
CHLORIDE SERPL-SCNC: 101 MEQ/L (ref 98–107)
CO2 SERPL-SCNC: 34 MEQ/L (ref 21–32)
CREAT SERPL-MCNC: 1.48 MG/DL (ref 0.55–1.3)
GFR SERPL CREATININE-BSD FRML MDRD: 36.5 ML/MIN/{1.73_M2} (ref 39–?)
GLUCOSE SERPL-MCNC: 88 MG/DL (ref 70–100)
POTASSIUM SERPL-SCNC: 4.3 MEQ/L (ref 3.5–5.1)
SODIUM SERPL-SCNC: 138 MEQ/L (ref 136–145)

## 2022-03-29 ENCOUNTER — HOSPITAL ENCOUNTER (OUTPATIENT)
Dept: HOSPITAL 53 - M LAB REF | Age: 77
End: 2022-03-29
Attending: INTERNAL MEDICINE
Payer: MEDICARE

## 2022-03-29 DIAGNOSIS — N18.9: Primary | ICD-10-CM

## 2022-03-29 DIAGNOSIS — D63.1: ICD-10-CM

## 2022-03-29 LAB — PROT SERPL-MCNC: 7.9 GM/DL (ref 6.4–8.2)

## 2022-04-19 ENCOUNTER — HOSPITAL ENCOUNTER (OUTPATIENT)
Dept: HOSPITAL 53 - M RAD | Age: 77
End: 2022-04-19
Attending: INTERNAL MEDICINE
Payer: MEDICARE

## 2022-04-19 DIAGNOSIS — N18.32: Primary | ICD-10-CM

## 2022-06-15 ENCOUNTER — HOSPITAL ENCOUNTER (OUTPATIENT)
Dept: HOSPITAL 53 - M RAD | Age: 77
End: 2022-06-15
Attending: INTERNAL MEDICINE
Payer: MEDICARE

## 2022-06-15 DIAGNOSIS — D41.02: Primary | ICD-10-CM

## 2022-06-15 PROCEDURE — 74177 CT ABD & PELVIS W/CONTRAST: CPT

## 2022-11-28 ENCOUNTER — HOSPITAL ENCOUNTER (OUTPATIENT)
Dept: HOSPITAL 53 - M LAB | Age: 77
End: 2022-11-28
Attending: NURSE PRACTITIONER
Payer: MEDICARE

## 2022-11-28 DIAGNOSIS — R73.03: ICD-10-CM

## 2022-11-28 DIAGNOSIS — I10: Primary | ICD-10-CM

## 2022-11-28 LAB
ALBUMIN SERPL BCG-MCNC: 3.7 G/DL (ref 3.2–5.2)
ALP SERPL-CCNC: 91 U/L (ref 46–116)
ALT SERPL W P-5'-P-CCNC: < 9 U/L (ref 7–40)
AST SERPL-CCNC: 26 U/L (ref ?–34)
BILIRUB SERPL-MCNC: 0.4 MG/DL (ref 0.3–1.2)
BUN SERPL-MCNC: 21 MG/DL (ref 9–23)
CALCIUM SERPL-MCNC: 9.4 MG/DL (ref 8.3–10.6)
CHLORIDE SERPL-SCNC: 99 MMOL/L (ref 98–107)
CHOLEST SERPL-MCNC: 180 MG/DL (ref ?–200)
CHOLEST/HDLC SERPL: 3.71 {RATIO} (ref ?–5)
CO2 SERPL-SCNC: 32 MMOL/L (ref 20–31)
CREAT SERPL-MCNC: 1.4 MG/DL (ref 0.55–1.3)
EST. AVERAGE GLUCOSE BLD GHB EST-MCNC: 100 MG/DL (ref 60–110)
GFR SERPL CREATININE-BSD FRML MDRD: 38.9 ML/MIN/{1.73_M2} (ref 39–?)
GLUCOSE SERPL-MCNC: 116 MG/DL (ref 74–106)
HDLC SERPL-MCNC: 48.4 MG/DL (ref 40–?)
LDLC SERPL CALC-MCNC: 93.2 MG/DL (ref ?–100)
NONHDLC SERPL-MCNC: 132 MG/DL
POTASSIUM SERPL-SCNC: 4.4 MMOL/L (ref 3.5–5.1)
PROT SERPL-MCNC: 7.2 G/DL (ref 5.7–8.2)
SODIUM SERPL-SCNC: 140 MMOL/L (ref 136–145)
TRIGL SERPL-MCNC: 192 MG/DL (ref ?–150)

## 2024-01-02 ENCOUNTER — HOSPITAL ENCOUNTER (OUTPATIENT)
Dept: HOSPITAL 53 - M LAB | Age: 79
End: 2024-01-02
Attending: NURSE PRACTITIONER
Payer: MEDICARE

## 2024-01-02 DIAGNOSIS — I10: Primary | ICD-10-CM

## 2024-01-02 DIAGNOSIS — R73.03: ICD-10-CM

## 2024-01-02 LAB
ALBUMIN SERPL BCG-MCNC: 3.6 G/DL (ref 3.2–5.2)
ALP SERPL-CCNC: 97 U/L (ref 46–116)
ALT SERPL W P-5'-P-CCNC: < 9 U/L (ref 7–40)
AST SERPL-CCNC: 18 U/L (ref ?–34)
BASOPHILS # BLD AUTO: 0.1 10^3/UL (ref 0–0.2)
BASOPHILS NFR BLD AUTO: 0.6 % (ref 0–1)
BILIRUB SERPL-MCNC: 0.4 MG/DL (ref 0.3–1.2)
BUN SERPL-MCNC: 25 MG/DL (ref 9–23)
CALCIUM SERPL-MCNC: 9.1 MG/DL (ref 8.3–10.6)
CHLORIDE SERPL-SCNC: 102 MMOL/L (ref 98–107)
CO2 SERPL-SCNC: 30 MMOL/L (ref 20–31)
CREAT SERPL-MCNC: 1.41 MG/DL (ref 0.55–1.3)
EOSINOPHIL # BLD AUTO: 0.2 10^3/UL (ref 0–0.5)
EOSINOPHIL NFR BLD AUTO: 1.8 % (ref 0–3)
EST. AVERAGE GLUCOSE BLD GHB EST-MCNC: 103 MG/DL (ref 60–110)
GFR SERPL CREATININE-BSD FRML MDRD: 38.5 ML/MIN/{1.73_M2} (ref 39–?)
GLUCOSE SERPL-MCNC: 97 MG/DL (ref 74–106)
HCT VFR BLD AUTO: 44 % (ref 36–47)
HGB BLD-MCNC: 13.9 G/DL (ref 12–15.5)
LYMPHOCYTES # BLD AUTO: 1.7 10^3/UL (ref 1.5–5)
LYMPHOCYTES NFR BLD AUTO: 18.4 % (ref 24–44)
MCH RBC QN AUTO: 31.9 PG (ref 27–33)
MCHC RBC AUTO-ENTMCNC: 31.6 G/DL (ref 32–36.5)
MCV RBC AUTO: 100.9 FL (ref 80–96)
MONOCYTES # BLD AUTO: 1 10^3/UL (ref 0–0.8)
MONOCYTES NFR BLD AUTO: 11.1 % (ref 2–8)
NEUTROPHILS # BLD AUTO: 6.1 10^3/UL (ref 1.5–8.5)
NEUTROPHILS NFR BLD AUTO: 67.7 % (ref 36–66)
PLATELET # BLD AUTO: 308 10^3/UL (ref 150–450)
POTASSIUM SERPL-SCNC: 4.2 MMOL/L (ref 3.5–5.1)
PROT SERPL-MCNC: 7.4 G/DL (ref 5.7–8.2)
RBC # BLD AUTO: 4.36 10^6/UL (ref 4–5.4)
SODIUM SERPL-SCNC: 140 MMOL/L (ref 136–145)
WBC # BLD AUTO: 9.1 10^3/UL (ref 4–10)

## 2024-02-02 ENCOUNTER — HOSPITAL ENCOUNTER (EMERGENCY)
Dept: HOSPITAL 53 - M ED | Age: 79
Discharge: HOME | End: 2024-02-02
Payer: MEDICARE

## 2024-02-02 VITALS — OXYGEN SATURATION: 95 % | SYSTOLIC BLOOD PRESSURE: 110 MMHG | TEMPERATURE: 98.9 F | DIASTOLIC BLOOD PRESSURE: 64 MMHG

## 2024-02-02 VITALS — BODY MASS INDEX: 41.71 KG/M2 | HEIGHT: 62 IN | WEIGHT: 226.64 LBS

## 2024-02-02 DIAGNOSIS — I27.22: ICD-10-CM

## 2024-02-02 DIAGNOSIS — I50.9: ICD-10-CM

## 2024-02-02 DIAGNOSIS — Z79.899: ICD-10-CM

## 2024-02-02 DIAGNOSIS — J84.9: ICD-10-CM

## 2024-02-02 DIAGNOSIS — Z88.8: ICD-10-CM

## 2024-02-02 DIAGNOSIS — J18.1: Primary | ICD-10-CM

## 2024-02-02 DIAGNOSIS — E66.9: ICD-10-CM

## 2024-02-02 LAB
ALBUMIN SERPL BCG-MCNC: 3.4 G/DL (ref 3.2–5.2)
ALP SERPL-CCNC: 84 U/L (ref 46–116)
ALT SERPL W P-5'-P-CCNC: < 9 U/L (ref 7–40)
AST SERPL-CCNC: 19 U/L (ref ?–34)
BASOPHILS # BLD AUTO: 0 10^3/UL (ref 0–0.2)
BASOPHILS NFR BLD AUTO: 0.2 % (ref 0–1)
BILIRUB CONJ SERPL-MCNC: 0.5 MG/DL (ref ?–0.4)
BILIRUB SERPL-MCNC: 1.4 MG/DL (ref 0.3–1.2)
BUN SERPL-MCNC: 19 MG/DL (ref 9–23)
CALCIUM SERPL-MCNC: 8.9 MG/DL (ref 8.3–10.6)
CHLORIDE SERPL-SCNC: 101 MMOL/L (ref 98–107)
CK MB CFR.DF SERPL CALC: 1.81
CK MB SERPL-MCNC: < 1 NG/ML (ref ?–3.6)
CK SERPL-CCNC: 55 U/L (ref 34–145)
CO2 SERPL-SCNC: 30 MMOL/L (ref 20–31)
CREAT SERPL-MCNC: 1.38 MG/DL (ref 0.55–1.3)
EOSINOPHIL # BLD AUTO: 0.1 10^3/UL (ref 0–0.5)
EOSINOPHIL NFR BLD AUTO: 0.4 % (ref 0–3)
GFR SERPL CREATININE-BSD FRML MDRD: 39.4 ML/MIN/{1.73_M2} (ref 39–?)
GLUCOSE SERPL-MCNC: 147 MG/DL (ref 74–106)
HCT VFR BLD AUTO: 43.2 % (ref 36–47)
HGB BLD-MCNC: 13.9 G/DL (ref 12–15.5)
LYMPHOCYTES # BLD AUTO: 1.2 10^3/UL (ref 1.5–5)
LYMPHOCYTES NFR BLD AUTO: 7.1 % (ref 24–44)
MCH RBC QN AUTO: 32.2 PG (ref 27–33)
MCHC RBC AUTO-ENTMCNC: 32.2 G/DL (ref 32–36.5)
MCV RBC AUTO: 100 FL (ref 80–96)
MONOCYTES # BLD AUTO: 1.4 10^3/UL (ref 0–0.8)
MONOCYTES NFR BLD AUTO: 8.5 % (ref 2–8)
NEUTROPHILS # BLD AUTO: 13.7 10^3/UL (ref 1.5–8.5)
NEUTROPHILS NFR BLD AUTO: 83.4 % (ref 36–66)
PLATELET # BLD AUTO: 277 10^3/UL (ref 150–450)
POTASSIUM SERPL-SCNC: 4 MMOL/L (ref 3.5–5.1)
PROT SERPL-MCNC: 7.7 G/DL (ref 5.7–8.2)
RBC # BLD AUTO: 4.32 10^6/UL (ref 4–5.4)
SODIUM SERPL-SCNC: 138 MMOL/L (ref 136–145)
WBC # BLD AUTO: 16.4 10^3/UL (ref 4–10)

## 2024-02-02 PROCEDURE — 82553 CREATINE MB FRACTION: CPT

## 2024-02-02 PROCEDURE — 93041 RHYTHM ECG TRACING: CPT

## 2024-02-02 PROCEDURE — 84484 ASSAY OF TROPONIN QUANT: CPT

## 2024-02-02 PROCEDURE — 83605 ASSAY OF LACTIC ACID: CPT

## 2024-02-02 PROCEDURE — 71275 CT ANGIOGRAPHY CHEST: CPT

## 2024-02-02 PROCEDURE — 80076 HEPATIC FUNCTION PANEL: CPT

## 2024-02-02 PROCEDURE — 94640 AIRWAY INHALATION TREATMENT: CPT

## 2024-02-02 PROCEDURE — 87633 RESP VIRUS 12-25 TARGETS: CPT

## 2024-02-02 PROCEDURE — 87486 CHLMYD PNEUM DNA AMP PROBE: CPT

## 2024-02-02 PROCEDURE — 82550 ASSAY OF CK (CPK): CPT

## 2024-02-02 PROCEDURE — 87798 DETECT AGENT NOS DNA AMP: CPT

## 2024-02-02 PROCEDURE — 96374 THER/PROPH/DIAG INJ IV PUSH: CPT

## 2024-02-02 PROCEDURE — 80048 BASIC METABOLIC PNL TOTAL CA: CPT

## 2024-02-02 PROCEDURE — 93005 ELECTROCARDIOGRAM TRACING: CPT

## 2024-02-02 PROCEDURE — 94760 N-INVAS EAR/PLS OXIMETRY 1: CPT

## 2024-02-02 PROCEDURE — 71045 X-RAY EXAM CHEST 1 VIEW: CPT

## 2024-02-02 PROCEDURE — 99285 EMERGENCY DEPT VISIT HI MDM: CPT

## 2024-02-02 PROCEDURE — 87581 M.PNEUMON DNA AMP PROBE: CPT

## 2024-02-02 PROCEDURE — 83880 ASSAY OF NATRIURETIC PEPTIDE: CPT

## 2024-02-02 PROCEDURE — 85025 COMPLETE CBC W/AUTO DIFF WBC: CPT

## 2024-02-02 RX ADMIN — IPRATROPIUM BROMIDE AND ALBUTEROL SULFATE SCH ML: .5; 3 SOLUTION RESPIRATORY (INHALATION) at 08:51

## 2024-02-02 RX ADMIN — IPRATROPIUM BROMIDE AND ALBUTEROL SULFATE SCH ML: .5; 3 SOLUTION RESPIRATORY (INHALATION) at 08:22

## 2024-02-02 RX ADMIN — IPRATROPIUM BROMIDE AND ALBUTEROL SULFATE SCH ML: .5; 3 SOLUTION RESPIRATORY (INHALATION) at 07:50

## 2024-02-20 ENCOUNTER — HOSPITAL ENCOUNTER (EMERGENCY)
Dept: HOSPITAL 53 - M ED | Age: 79
LOS: 1 days | Discharge: HOME | End: 2024-02-21
Payer: MEDICARE

## 2024-02-20 VITALS — WEIGHT: 220.46 LBS | BODY MASS INDEX: 37.64 KG/M2 | HEIGHT: 64 IN

## 2024-02-20 VITALS — OXYGEN SATURATION: 93 %

## 2024-02-20 DIAGNOSIS — Z88.8: ICD-10-CM

## 2024-02-20 DIAGNOSIS — Z79.51: ICD-10-CM

## 2024-02-20 DIAGNOSIS — R00.0: ICD-10-CM

## 2024-02-20 DIAGNOSIS — Z79.52: ICD-10-CM

## 2024-02-20 DIAGNOSIS — Z79.2: ICD-10-CM

## 2024-02-20 DIAGNOSIS — Z79.899: ICD-10-CM

## 2024-02-20 DIAGNOSIS — I45.10: ICD-10-CM

## 2024-02-20 DIAGNOSIS — I50.22: ICD-10-CM

## 2024-02-20 DIAGNOSIS — R06.02: Primary | ICD-10-CM

## 2024-02-20 DIAGNOSIS — I11.0: ICD-10-CM

## 2024-02-20 LAB
ALBUMIN SERPL BCG-MCNC: 2.8 G/DL (ref 3.2–5.2)
ALP SERPL-CCNC: 80 U/L (ref 46–116)
ALT SERPL W P-5'-P-CCNC: < 9 U/L (ref 7–40)
AST SERPL-CCNC: 19 U/L (ref ?–34)
BASE EXCESS BLDV CALC-SCNC: 4 MMOL/L (ref -2–2)
BASOPHILS # BLD AUTO: 0 10^3/UL (ref 0–0.2)
BASOPHILS NFR BLD AUTO: 0.2 % (ref 0–1)
BILIRUB CONJ SERPL-MCNC: 0.3 MG/DL (ref ?–0.4)
BILIRUB SERPL-MCNC: 0.9 MG/DL (ref 0.3–1.2)
BUN SERPL-MCNC: 22 MG/DL (ref 9–23)
CALCIUM SERPL-MCNC: 8.4 MG/DL (ref 8.3–10.6)
CHLORIDE SERPL-SCNC: 99 MMOL/L (ref 98–107)
CK MB CFR.DF SERPL CALC: 1.16
CK MB SERPL-MCNC: < 1 NG/ML (ref ?–3.6)
CK SERPL-CCNC: 86 U/L (ref 34–145)
CO2 BLDV CALC-SCNC: 32 MMOL/L (ref 24–28)
CO2 SERPL-SCNC: 31 MMOL/L (ref 20–31)
CREAT SERPL-MCNC: 1.52 MG/DL (ref 0.55–1.3)
EOSINOPHIL # BLD AUTO: 0 10^3/UL (ref 0–0.5)
EOSINOPHIL NFR BLD AUTO: 0.2 % (ref 0–3)
GFR SERPL CREATININE-BSD FRML MDRD: 35.2 ML/MIN/{1.73_M2} (ref 39–?)
GLUCOSE SERPL-MCNC: 168 MG/DL (ref 74–106)
HCO3 BLDV-SCNC: 30.4 MMOL/L (ref 23–27)
HCO3 STD BLDV-SCNC: 27.6 MMOL/L
HCT VFR BLD AUTO: 41.5 % (ref 36–47)
HGB BLD-MCNC: 13.5 G/DL (ref 12–15.5)
INR PPP: 1.18
LYMPHOCYTES # BLD AUTO: 1.3 10^3/UL (ref 1.5–5)
LYMPHOCYTES NFR BLD AUTO: 10.7 % (ref 24–44)
MCH RBC QN AUTO: 32.5 PG (ref 27–33)
MCHC RBC AUTO-ENTMCNC: 32.5 G/DL (ref 32–36.5)
MCV RBC AUTO: 99.8 FL (ref 80–96)
MONOCYTES # BLD AUTO: 1.3 10^3/UL (ref 0–0.8)
MONOCYTES NFR BLD AUTO: 10.6 % (ref 2–8)
NEUTROPHILS # BLD AUTO: 9.6 10^3/UL (ref 1.5–8.5)
NEUTROPHILS NFR BLD AUTO: 77.5 % (ref 36–66)
PCO2 BLDV: 52.4 MMHG (ref 38–50)
PH BLDV: 7.38 UNITS (ref 7.33–7.43)
PLATELET # BLD AUTO: 218 10^3/UL (ref 150–450)
PO2 BLDV: 42.1 MMHG (ref 30–50)
POTASSIUM SERPL-SCNC: 3.7 MMOL/L (ref 3.5–5.1)
PROT SERPL-MCNC: 6.4 G/DL (ref 5.7–8.2)
PROTHROMBIN TIME: 14.6 SECONDS (ref 12.5–14.5)
RBC # BLD AUTO: 4.16 10^6/UL (ref 4–5.4)
SAO2 % BLDV: 81.3 % (ref 60–80)
SODIUM SERPL-SCNC: 136 MMOL/L (ref 136–145)
T4 SERPL-MCNC: 6.1 UG/DL (ref 4.5–10.9)
TSH SERPL DL<=0.005 MIU/L-ACNC: 5.7 UIU/ML (ref 0.55–4.78)
WBC # BLD AUTO: 12.4 10^3/UL (ref 4–10)

## 2024-02-20 PROCEDURE — 36415 COLL VENOUS BLD VENIPUNCTURE: CPT

## 2024-02-20 PROCEDURE — 87040 BLOOD CULTURE FOR BACTERIA: CPT

## 2024-02-20 PROCEDURE — 94760 N-INVAS EAR/PLS OXIMETRY 1: CPT

## 2024-02-20 PROCEDURE — 71275 CT ANGIOGRAPHY CHEST: CPT

## 2024-02-20 PROCEDURE — 71045 X-RAY EXAM CHEST 1 VIEW: CPT

## 2024-02-20 PROCEDURE — 82803 BLOOD GASES ANY COMBINATION: CPT

## 2024-02-20 PROCEDURE — 80076 HEPATIC FUNCTION PANEL: CPT

## 2024-02-20 PROCEDURE — 85610 PROTHROMBIN TIME: CPT

## 2024-02-20 PROCEDURE — 84484 ASSAY OF TROPONIN QUANT: CPT

## 2024-02-20 PROCEDURE — 94640 AIRWAY INHALATION TREATMENT: CPT

## 2024-02-20 PROCEDURE — 80048 BASIC METABOLIC PNL TOTAL CA: CPT

## 2024-02-20 PROCEDURE — 87486 CHLMYD PNEUM DNA AMP PROBE: CPT

## 2024-02-20 PROCEDURE — 83880 ASSAY OF NATRIURETIC PEPTIDE: CPT

## 2024-02-20 PROCEDURE — 84443 ASSAY THYROID STIM HORMONE: CPT

## 2024-02-20 PROCEDURE — 87798 DETECT AGENT NOS DNA AMP: CPT

## 2024-02-20 PROCEDURE — 84436 ASSAY OF TOTAL THYROXINE: CPT

## 2024-02-20 PROCEDURE — 93041 RHYTHM ECG TRACING: CPT

## 2024-02-20 PROCEDURE — 82553 CREATINE MB FRACTION: CPT

## 2024-02-20 PROCEDURE — 83605 ASSAY OF LACTIC ACID: CPT

## 2024-02-20 PROCEDURE — 85025 COMPLETE CBC W/AUTO DIFF WBC: CPT

## 2024-02-20 PROCEDURE — 87633 RESP VIRUS 12-25 TARGETS: CPT

## 2024-02-20 PROCEDURE — 93005 ELECTROCARDIOGRAM TRACING: CPT

## 2024-02-20 PROCEDURE — 82550 ASSAY OF CK (CPK): CPT

## 2024-02-20 PROCEDURE — 87581 M.PNEUMON DNA AMP PROBE: CPT

## 2024-02-20 PROCEDURE — 99285 EMERGENCY DEPT VISIT HI MDM: CPT

## 2024-02-20 RX ADMIN — IPRATROPIUM BROMIDE AND ALBUTEROL SULFATE ONE ML: .5; 3 SOLUTION RESPIRATORY (INHALATION) at 22:21

## 2024-02-21 VITALS — OXYGEN SATURATION: 92 % | TEMPERATURE: 98.8 F | DIASTOLIC BLOOD PRESSURE: 92 MMHG | SYSTOLIC BLOOD PRESSURE: 132 MMHG

## 2024-07-25 ENCOUNTER — HOSPITAL ENCOUNTER (INPATIENT)
Dept: HOSPITAL 53 - M ED | Age: 79
LOS: 15 days | Discharge: NURSING, REHAB OR LONG-TERM CARE | DRG: 196 | End: 2024-08-09
Attending: GENERAL PRACTICE | Admitting: INTERNAL MEDICINE
Payer: MEDICARE

## 2024-07-25 VITALS — HEIGHT: 61 IN | BODY MASS INDEX: 45.66 KG/M2 | WEIGHT: 241.85 LBS

## 2024-07-25 VITALS — OXYGEN SATURATION: 99 % | DIASTOLIC BLOOD PRESSURE: 81 MMHG | TEMPERATURE: 98.7 F | SYSTOLIC BLOOD PRESSURE: 123 MMHG

## 2024-07-25 DIAGNOSIS — Z66: ICD-10-CM

## 2024-07-25 DIAGNOSIS — J96.21: ICD-10-CM

## 2024-07-25 DIAGNOSIS — Z79.899: ICD-10-CM

## 2024-07-25 DIAGNOSIS — Z79.52: ICD-10-CM

## 2024-07-25 DIAGNOSIS — Z99.81: ICD-10-CM

## 2024-07-25 DIAGNOSIS — R26.89: ICD-10-CM

## 2024-07-25 DIAGNOSIS — I13.0: ICD-10-CM

## 2024-07-25 DIAGNOSIS — N18.30: ICD-10-CM

## 2024-07-25 DIAGNOSIS — J96.22: ICD-10-CM

## 2024-07-25 DIAGNOSIS — E87.5: ICD-10-CM

## 2024-07-25 DIAGNOSIS — K44.9: ICD-10-CM

## 2024-07-25 DIAGNOSIS — E87.4: ICD-10-CM

## 2024-07-25 DIAGNOSIS — D75.1: ICD-10-CM

## 2024-07-25 DIAGNOSIS — N17.9: ICD-10-CM

## 2024-07-25 DIAGNOSIS — R51.9: ICD-10-CM

## 2024-07-25 DIAGNOSIS — I24.89: ICD-10-CM

## 2024-07-25 DIAGNOSIS — G93.41: ICD-10-CM

## 2024-07-25 DIAGNOSIS — J84.9: Primary | ICD-10-CM

## 2024-07-25 DIAGNOSIS — Z88.6: ICD-10-CM

## 2024-07-25 DIAGNOSIS — R41.0: ICD-10-CM

## 2024-07-25 DIAGNOSIS — I50.33: ICD-10-CM

## 2024-07-25 DIAGNOSIS — E66.01: ICD-10-CM

## 2024-07-25 LAB
ALBUMIN SERPL BCG-MCNC: 3.9 G/DL (ref 3.2–5.2)
ALP SERPL-CCNC: 73 U/L (ref 46–116)
ALT SERPL W P-5'-P-CCNC: 19 U/L (ref 7–40)
AST SERPL-CCNC: 24 U/L (ref ?–34)
BASE EXCESS BLDA CALC-SCNC: -4 MMOL/L (ref -2–2)
BASE EXCESS BLDA CALC-SCNC: 0.1 MMOL/L (ref -2–2)
BASOPHILS # BLD AUTO: 0.1 10^3/UL (ref 0–0.2)
BASOPHILS NFR BLD AUTO: 0.5 % (ref 0–1)
BILIRUB CONJ SERPL-MCNC: 0.4 MG/DL (ref ?–0.4)
BILIRUB SERPL-MCNC: 1 MG/DL (ref 0.3–1.2)
BUN SERPL-MCNC: 38 MG/DL (ref 9–23)
CALCIUM SERPL-MCNC: 9.1 MG/DL (ref 8.3–10.6)
CHLORIDE SERPL-SCNC: 103 MMOL/L (ref 98–107)
CK MB CFR.DF SERPL CALC: 5.45
CK MB CFR.DF SERPL CALC: 6.41
CK MB SERPL-MCNC: 3 NG/ML (ref ?–3.6)
CK MB SERPL-MCNC: 3.4 NG/ML (ref ?–3.6)
CK SERPL-CCNC: 53 U/L (ref 34–145)
CK SERPL-CCNC: 55 U/L (ref 34–145)
CO2 BLDA CALC-SCNC: 29.5 MMOL/L (ref 23–31)
CO2 BLDA CALC-SCNC: 31.6 MMOL/L (ref 23–31)
CO2 SERPL-SCNC: 31 MMOL/L (ref 20–31)
CREAT SERPL-MCNC: 1.82 MG/DL (ref 0.55–1.3)
EOSINOPHIL # BLD AUTO: 0 10^3/UL (ref 0–0.5)
EOSINOPHIL NFR BLD AUTO: 0.4 % (ref 0–3)
ETHANOL SERPL-MCNC: < 0.003 % (ref 0–0.01)
GFR SERPL CREATININE-BSD FRML MDRD: 28.6 ML/MIN/{1.73_M2} (ref 39–?)
GLUCOSE SERPL-MCNC: 145 MG/DL (ref 74–106)
HCO3 BLDA-SCNC: 27.1 MMOL/L (ref 22–26)
HCO3 BLDA-SCNC: 29.5 MMOL/L (ref 22–26)
HCO3 STD BLDA-SCNC: 21.2 MMOL/L. (ref 22–26)
HCO3 STD BLDA-SCNC: 24.6 MMOL/L. (ref 22–26)
HCT VFR BLD AUTO: 51.1 % (ref 36–47)
HGB BLD-MCNC: 15.5 G/DL (ref 12–15.5)
LYMPHOCYTES # BLD AUTO: 1.5 10^3/UL (ref 1.5–5)
LYMPHOCYTES NFR BLD AUTO: 13.7 % (ref 24–44)
MCH RBC QN AUTO: 31.7 PG (ref 27–33)
MCHC RBC AUTO-ENTMCNC: 30.3 G/DL (ref 32–36.5)
MCV RBC AUTO: 104.5 FL (ref 80–96)
MONOCYTES # BLD AUTO: 1 10^3/UL (ref 0–0.8)
MONOCYTES NFR BLD AUTO: 9.1 % (ref 2–8)
NEUTROPHILS # BLD AUTO: 8 10^3/UL (ref 1.5–8.5)
NEUTROPHILS NFR BLD AUTO: 75.1 % (ref 36–66)
PCO2 BLDA: 69.9 MMHG (ref 35–45)
PCO2 BLDA: 78.4 MMHG (ref 35–45)
PH BLDA: 7.16 UNITS (ref 7.35–7.45)
PH BLDA: 7.24 UNITS (ref 7.35–7.45)
PLATELET # BLD AUTO: 284 10^3/UL (ref 150–450)
PO2 BLDA: 129.6 MMHG (ref 75–100)
PO2 BLDA: 317.8 MMHG (ref 75–100)
POTASSIUM SERPL-SCNC: 5.8 MMOL/L (ref 3.5–5.1)
PROCALCITONIN SERPL-MCNC: 0.07 NG/ML
PROT SERPL-MCNC: 7.1 G/DL (ref 5.7–8.2)
RBC # BLD AUTO: 4.89 10^6/UL (ref 4–5.4)
SALICYLATES SERPL-MCNC: < 3 MG/DL (ref ?–30)
SAO2 % BLDA: 98.3 % (ref 95–99)
SAO2 % BLDA: 99.6 % (ref 95–99)
SODIUM SERPL-SCNC: 138 MMOL/L (ref 136–145)
TSH SERPL DL<=0.005 MIU/L-ACNC: 4.53 UIU/ML (ref 0.55–4.78)
WBC # BLD AUTO: 10.6 10^3/UL (ref 4–10)

## 2024-07-25 RX ADMIN — LIDOCAINE HYDROCHLORIDE ONE DOSE: 20 JELLY TOPICAL at 17:33

## 2024-07-25 RX ADMIN — IPRATROPIUM BROMIDE AND ALBUTEROL SULFATE ONE ML: .5; 3 SOLUTION RESPIRATORY (INHALATION) at 17:23

## 2024-07-25 RX ADMIN — METHYLPREDNISOLONE SODIUM SUCCINATE ONE MG: 125 INJECTION, POWDER, FOR SOLUTION INTRAMUSCULAR; INTRAVENOUS at 17:19

## 2024-07-25 RX ADMIN — DEXTROSE MONOHYDRATE SCH MLS/HR: 50 INJECTION, SOLUTION INTRAVENOUS at 23:57

## 2024-07-25 RX ADMIN — ALBUTEROL SULFATE ONE MG: 2.5 SOLUTION RESPIRATORY (INHALATION) at 17:23

## 2024-07-25 RX ADMIN — CEFTRIAXONE SCH MLS/HR: 1 INJECTION, POWDER, FOR SOLUTION INTRAMUSCULAR; INTRAVENOUS at 21:56

## 2024-07-25 RX ADMIN — SODIUM CHLORIDE ONE MLS/HR: 9 INJECTION, SOLUTION INTRAVENOUS at 19:43

## 2024-07-25 RX ADMIN — DEXTROSE MONOHYDRATE ONE MLS/HR: 5 INJECTION INTRAVENOUS at 18:23

## 2024-07-26 VITALS — OXYGEN SATURATION: 92 % | DIASTOLIC BLOOD PRESSURE: 71 MMHG | SYSTOLIC BLOOD PRESSURE: 126 MMHG | TEMPERATURE: 97.5 F

## 2024-07-26 VITALS — SYSTOLIC BLOOD PRESSURE: 115 MMHG | TEMPERATURE: 98.9 F | OXYGEN SATURATION: 96 % | DIASTOLIC BLOOD PRESSURE: 60 MMHG

## 2024-07-26 VITALS — SYSTOLIC BLOOD PRESSURE: 123 MMHG | OXYGEN SATURATION: 98 % | DIASTOLIC BLOOD PRESSURE: 61 MMHG

## 2024-07-26 VITALS — DIASTOLIC BLOOD PRESSURE: 70 MMHG | OXYGEN SATURATION: 93 % | SYSTOLIC BLOOD PRESSURE: 126 MMHG | TEMPERATURE: 97.3 F

## 2024-07-26 VITALS — TEMPERATURE: 97 F | OXYGEN SATURATION: 94 % | SYSTOLIC BLOOD PRESSURE: 134 MMHG | DIASTOLIC BLOOD PRESSURE: 63 MMHG

## 2024-07-26 VITALS — DIASTOLIC BLOOD PRESSURE: 64 MMHG | SYSTOLIC BLOOD PRESSURE: 138 MMHG | OXYGEN SATURATION: 94 %

## 2024-07-26 VITALS — SYSTOLIC BLOOD PRESSURE: 112 MMHG | OXYGEN SATURATION: 100 % | DIASTOLIC BLOOD PRESSURE: 62 MMHG

## 2024-07-26 VITALS — SYSTOLIC BLOOD PRESSURE: 112 MMHG | DIASTOLIC BLOOD PRESSURE: 63 MMHG | OXYGEN SATURATION: 97 %

## 2024-07-26 VITALS — DIASTOLIC BLOOD PRESSURE: 67 MMHG | SYSTOLIC BLOOD PRESSURE: 126 MMHG | OXYGEN SATURATION: 94 %

## 2024-07-26 VITALS — TEMPERATURE: 97.1 F | DIASTOLIC BLOOD PRESSURE: 59 MMHG | OXYGEN SATURATION: 99 % | SYSTOLIC BLOOD PRESSURE: 119 MMHG

## 2024-07-26 VITALS — OXYGEN SATURATION: 99 % | DIASTOLIC BLOOD PRESSURE: 62 MMHG | SYSTOLIC BLOOD PRESSURE: 122 MMHG

## 2024-07-26 VITALS — OXYGEN SATURATION: 97 %

## 2024-07-26 VITALS — OXYGEN SATURATION: 98 % | SYSTOLIC BLOOD PRESSURE: 121 MMHG | DIASTOLIC BLOOD PRESSURE: 63 MMHG

## 2024-07-26 LAB
ALBUMIN SERPL BCG-MCNC: 3.4 G/DL (ref 3.2–5.2)
ALP SERPL-CCNC: 59 U/L (ref 46–116)
ALT SERPL W P-5'-P-CCNC: 12 U/L (ref 7–40)
AST SERPL-CCNC: 19 U/L (ref ?–34)
BASE EXCESS BLDV CALC-SCNC: -0.4 MMOL/L (ref -2–2)
BILIRUB SERPL-MCNC: 1.1 MG/DL (ref 0.3–1.2)
BUN SERPL-MCNC: 38 MG/DL (ref 9–23)
BUN SERPL-MCNC: 39 MG/DL (ref 9–23)
CALCIUM SERPL-MCNC: 9 MG/DL (ref 8.3–10.6)
CALCIUM SERPL-MCNC: 9.8 MG/DL (ref 8.3–10.6)
CHLORIDE SERPL-SCNC: 103 MMOL/L (ref 98–107)
CHLORIDE SERPL-SCNC: 105 MMOL/L (ref 98–107)
CO2 BLDV CALC-SCNC: 29.8 MMOL/L (ref 24–28)
CO2 SERPL-SCNC: 30 MMOL/L (ref 20–31)
CO2 SERPL-SCNC: 34 MMOL/L (ref 20–31)
CREAT SERPL-MCNC: 1.74 MG/DL (ref 0.55–1.3)
CREAT SERPL-MCNC: 1.86 MG/DL (ref 0.55–1.3)
GFR SERPL CREATININE-BSD FRML MDRD: 27.9 ML/MIN/{1.73_M2} (ref 39–?)
GFR SERPL CREATININE-BSD FRML MDRD: 30.1 ML/MIN/{1.73_M2} (ref 39–?)
GLUCOSE SERPL-MCNC: 142 MG/DL (ref 74–106)
GLUCOSE SERPL-MCNC: 171 MG/DL (ref 74–106)
HCO3 BLDV-SCNC: 27.9 MMOL/L (ref 23–27)
HCO3 STD BLDV-SCNC: 23.3 MMOL/L
HCT VFR BLD AUTO: 45.6 % (ref 36–47)
HGB BLD-MCNC: 14.1 G/DL (ref 12–15.5)
MAGNESIUM SERPL-MCNC: 1.8 MG/DL (ref 1.8–2.4)
MCH RBC QN AUTO: 31.6 PG (ref 27–33)
MCHC RBC AUTO-ENTMCNC: 30.9 G/DL (ref 32–36.5)
MCV RBC AUTO: 102.2 FL (ref 80–96)
PCO2 BLDV: 61.3 MMHG (ref 38–50)
PH BLDV: 7.28 UNITS (ref 7.33–7.43)
PLATELET # BLD AUTO: 237 10^3/UL (ref 150–450)
PO2 BLDV: 31.8 MMHG (ref 30–50)
POTASSIUM SERPL-SCNC: 5 MMOL/L (ref 3.5–5.1)
POTASSIUM SERPL-SCNC: 5.2 MMOL/L (ref 3.5–5.1)
POTASSIUM SERPL-SCNC: 6.2 MMOL/L (ref 3.5–5.1)
PROT SERPL-MCNC: 6.4 G/DL (ref 5.7–8.2)
RBC # BLD AUTO: 4.46 10^6/UL (ref 4–5.4)
SAO2 % BLDV: 64.2 % (ref 60–80)
SODIUM SERPL-SCNC: 137 MMOL/L (ref 136–145)
SODIUM SERPL-SCNC: 141 MMOL/L (ref 136–145)
URATE SERPL-MCNC: 10.3 MG/DL (ref 3.1–7.8)
VIT B12 SERPL-MCNC: 261 PG/ML (ref 211–911)
WBC # BLD AUTO: 5.8 10^3/UL (ref 4–10)

## 2024-07-26 PROCEDURE — B246ZZZ ULTRASONOGRAPHY OF RIGHT AND LEFT HEART: ICD-10-PCS

## 2024-07-26 RX ADMIN — DEXTROSE MONOHYDRATE STA ML: 25 INJECTION, SOLUTION INTRAVENOUS at 05:50

## 2024-07-26 RX ADMIN — INSULIN HUMAN STA UNITS: 100 INJECTION, SOLUTION PARENTERAL at 05:50

## 2024-07-26 RX ADMIN — IPRATROPIUM BROMIDE AND ALBUTEROL SULFATE PRN ML: .5; 3 SOLUTION RESPIRATORY (INHALATION) at 00:57

## 2024-07-26 RX ADMIN — METHYLPREDNISOLONE SODIUM SUCCINATE SCH MG: 125 INJECTION, POWDER, FOR SOLUTION INTRAMUSCULAR; INTRAVENOUS at 06:00

## 2024-07-26 RX ADMIN — SODIUM CHLORIDE SCH UNITS: 4.5 INJECTION, SOLUTION INTRAVENOUS at 08:15

## 2024-07-26 RX ADMIN — ACETAMINOPHEN PRN MG: 325 TABLET ORAL at 16:49

## 2024-07-26 RX ADMIN — PATIROMER ONE GM: 8.4 POWDER, FOR SUSPENSION ORAL at 12:17

## 2024-07-26 RX ADMIN — DEXTROSE AND SODIUM CHLORIDE ONE MLS/HR: 10; .2 INJECTION, SOLUTION INTRAVENOUS at 11:07

## 2024-07-26 RX ADMIN — PANTOPRAZOLE SODIUM SCH MG: 40 TABLET, DELAYED RELEASE ORAL at 09:19

## 2024-07-26 RX ADMIN — DEXTROSE AND SODIUM CHLORIDE ONE MLS/HR: 10; .2 INJECTION, SOLUTION INTRAVENOUS at 05:59

## 2024-07-26 RX ADMIN — CETIRIZINE HYDROCHLORIDE SCH MG: 10 TABLET, FILM COATED ORAL at 22:09

## 2024-07-27 VITALS — DIASTOLIC BLOOD PRESSURE: 80 MMHG | TEMPERATURE: 97.5 F | SYSTOLIC BLOOD PRESSURE: 133 MMHG | OXYGEN SATURATION: 95 %

## 2024-07-27 VITALS — DIASTOLIC BLOOD PRESSURE: 87 MMHG | SYSTOLIC BLOOD PRESSURE: 135 MMHG | OXYGEN SATURATION: 95 %

## 2024-07-27 VITALS — SYSTOLIC BLOOD PRESSURE: 130 MMHG | TEMPERATURE: 97.7 F | DIASTOLIC BLOOD PRESSURE: 64 MMHG | OXYGEN SATURATION: 94 %

## 2024-07-27 VITALS — TEMPERATURE: 97.5 F

## 2024-07-27 VITALS — SYSTOLIC BLOOD PRESSURE: 141 MMHG | TEMPERATURE: 97.7 F | OXYGEN SATURATION: 95 % | DIASTOLIC BLOOD PRESSURE: 81 MMHG

## 2024-07-27 LAB
BASE EXCESS BLDA CALC-SCNC: -1.2 MMOL/L (ref -2–2)
BASE EXCESS BLDA CALC-SCNC: -4.1 MMOL/L (ref -2–2)
BASOPHILS # BLD AUTO: 0 10^3/UL (ref 0–0.2)
BASOPHILS NFR BLD AUTO: 0.2 % (ref 0–1)
BUN SERPL-MCNC: 50 MG/DL (ref 9–23)
CALCIUM SERPL-MCNC: 9.2 MG/DL (ref 8.3–10.6)
CHLORIDE SERPL-SCNC: 102 MMOL/L (ref 98–107)
CO2 BLDA CALC-SCNC: 27.5 MMOL/L (ref 23–31)
CO2 BLDA CALC-SCNC: 29 MMOL/L (ref 23–31)
CO2 SERPL-SCNC: 30 MMOL/L (ref 20–31)
CREAT SERPL-MCNC: 1.93 MG/DL (ref 0.55–1.3)
EOSINOPHIL # BLD AUTO: 0 10^3/UL (ref 0–0.5)
EOSINOPHIL NFR BLD AUTO: 0 % (ref 0–3)
GFR SERPL CREATININE-BSD FRML MDRD: 26.7 ML/MIN/{1.73_M2} (ref 39–?)
GLUCOSE SERPL-MCNC: 159 MG/DL (ref 74–106)
HCO3 BLDA-SCNC: 25.9 MMOL/L (ref 22–26)
HCO3 BLDA-SCNC: 26.6 MMOL/L (ref 22–26)
HCO3 STD BLDA-SCNC: 21.1 MMOL/L. (ref 22–26)
HCO3 STD BLDA-SCNC: 23.4 MMOL/L. (ref 22–26)
HCT VFR BLD AUTO: 48.3 % (ref 36–47)
HGB BLD-MCNC: 14.6 G/DL (ref 12–15.5)
LYMPHOCYTES # BLD AUTO: 0.3 10^3/UL (ref 1.5–5)
LYMPHOCYTES NFR BLD AUTO: 2.4 % (ref 24–44)
MCH RBC QN AUTO: 31.5 PG (ref 27–33)
MCHC RBC AUTO-ENTMCNC: 30.2 G/DL (ref 32–36.5)
MCV RBC AUTO: 104.3 FL (ref 80–96)
MONOCYTES # BLD AUTO: 0.4 10^3/UL (ref 0–0.8)
MONOCYTES NFR BLD AUTO: 3.4 % (ref 2–8)
NEUTROPHILS # BLD AUTO: 11.6 10^3/UL (ref 1.5–8.5)
NEUTROPHILS NFR BLD AUTO: 93 % (ref 36–66)
PCO2 BLDA: 52.4 MMHG (ref 35–45)
PCO2 BLDA: 76.8 MMHG (ref 35–45)
PH BLDA: 7.16 UNITS (ref 7.35–7.45)
PH BLDA: 7.31 UNITS (ref 7.35–7.45)
PLATELET # BLD AUTO: 244 10^3/UL (ref 150–450)
PO2 BLDA: 71.6 MMHG (ref 75–100)
PO2 BLDA: 94.2 MMHG (ref 75–100)
POTASSIUM SERPL-SCNC: 5.3 MMOL/L (ref 3.5–5.1)
RBC # BLD AUTO: 4.63 10^6/UL (ref 4–5.4)
SAO2 % BLDA: 95.1 % (ref 95–99)
SAO2 % BLDA: 96.9 % (ref 95–99)
SODIUM SERPL-SCNC: 136 MMOL/L (ref 136–145)
WBC # BLD AUTO: 12.5 10^3/UL (ref 4–10)

## 2024-07-27 RX ADMIN — DEXTROSE AND SODIUM CHLORIDE ONE MLS/HR: 10; .2 INJECTION, SOLUTION INTRAVENOUS at 13:36

## 2024-07-27 RX ADMIN — SODIUM POLYSTYRENE SULFONATE ONE GM: 15 SUSPENSION ORAL; RECTAL at 17:05

## 2024-07-27 RX ADMIN — METHYLPREDNISOLONE SODIUM SUCCINATE SCH MG: 40 INJECTION, POWDER, FOR SOLUTION INTRAMUSCULAR; INTRAVENOUS at 08:56

## 2024-07-27 RX ADMIN — DEXTROSE AND SODIUM CHLORIDE ONE MLS/HR: 10; .2 INJECTION, SOLUTION INTRAVENOUS at 06:03

## 2024-07-27 RX ADMIN — PATIROMER ONE GM: 8.4 POWDER, FOR SUSPENSION ORAL at 16:16

## 2024-07-28 VITALS — DIASTOLIC BLOOD PRESSURE: 68 MMHG | OXYGEN SATURATION: 89 % | SYSTOLIC BLOOD PRESSURE: 118 MMHG | TEMPERATURE: 97.3 F

## 2024-07-28 VITALS — DIASTOLIC BLOOD PRESSURE: 72 MMHG | OXYGEN SATURATION: 93 % | SYSTOLIC BLOOD PRESSURE: 116 MMHG

## 2024-07-28 VITALS — DIASTOLIC BLOOD PRESSURE: 73 MMHG | OXYGEN SATURATION: 95 % | SYSTOLIC BLOOD PRESSURE: 116 MMHG | TEMPERATURE: 97.5 F

## 2024-07-28 VITALS — SYSTOLIC BLOOD PRESSURE: 106 MMHG | TEMPERATURE: 97.3 F | DIASTOLIC BLOOD PRESSURE: 62 MMHG | OXYGEN SATURATION: 92 %

## 2024-07-28 LAB
BASE EXCESS BLDA CALC-SCNC: 2.9 MMOL/L (ref -2–2)
BASOPHILS # BLD AUTO: 0 10^3/UL (ref 0–0.2)
BASOPHILS NFR BLD AUTO: 0.2 % (ref 0–1)
BUN SERPL-MCNC: 44 MG/DL (ref 9–23)
CALCIUM SERPL-MCNC: 9.5 MG/DL (ref 8.3–10.6)
CHLORIDE SERPL-SCNC: 99 MMOL/L (ref 98–107)
CO2 BLDA CALC-SCNC: 33.1 MMOL/L (ref 23–31)
CO2 SERPL-SCNC: 32 MMOL/L (ref 20–31)
CREAT SERPL-MCNC: 1.62 MG/DL (ref 0.55–1.3)
EOSINOPHIL # BLD AUTO: 0 10^3/UL (ref 0–0.5)
EOSINOPHIL NFR BLD AUTO: 0 % (ref 0–3)
GFR SERPL CREATININE-BSD FRML MDRD: 32.7 ML/MIN/{1.73_M2} (ref 39–?)
GLUCOSE SERPL-MCNC: 78 MG/DL (ref 74–106)
HCO3 BLDA-SCNC: 31.2 MMOL/L (ref 22–26)
HCO3 STD BLDA-SCNC: 27 MMOL/L. (ref 22–26)
HCT VFR BLD AUTO: 43.3 % (ref 36–47)
HGB BLD-MCNC: 13.8 G/DL (ref 12–15.5)
LYMPHOCYTES # BLD AUTO: 1 10^3/UL (ref 1.5–5)
LYMPHOCYTES NFR BLD AUTO: 10.3 % (ref 24–44)
MCH RBC QN AUTO: 31.7 PG (ref 27–33)
MCHC RBC AUTO-ENTMCNC: 31.9 G/DL (ref 32–36.5)
MCV RBC AUTO: 99.3 FL (ref 80–96)
MONOCYTES # BLD AUTO: 1.2 10^3/UL (ref 0–0.8)
MONOCYTES NFR BLD AUTO: 12.2 % (ref 2–8)
NEUTROPHILS # BLD AUTO: 7.6 10^3/UL (ref 1.5–8.5)
NEUTROPHILS NFR BLD AUTO: 76.7 % (ref 36–66)
PCO2 BLDA: 63.5 MMHG (ref 35–45)
PH BLDA: 7.31 UNITS (ref 7.35–7.45)
PLATELET # BLD AUTO: 237 10^3/UL (ref 150–450)
PO2 BLDA: 74.3 MMHG (ref 75–100)
POTASSIUM SERPL-SCNC: 4.6 MMOL/L (ref 3.5–5.1)
RBC # BLD AUTO: 4.36 10^6/UL (ref 4–5.4)
SAO2 % BLDA: 94.5 % (ref 95–99)
SODIUM SERPL-SCNC: 136 MMOL/L (ref 136–145)
WBC # BLD AUTO: 9.9 10^3/UL (ref 4–10)

## 2024-07-28 RX ADMIN — CEFDINIR SCH MG: 300 CAPSULE ORAL at 16:04

## 2024-07-28 RX ADMIN — MIDODRINE HYDROCHLORIDE ONE MG: 5 TABLET ORAL at 16:04

## 2024-07-28 RX ADMIN — FUROSEMIDE ONE MG: 10 INJECTION, SOLUTION INTRAMUSCULAR; INTRAVENOUS at 16:05

## 2024-07-29 VITALS — DIASTOLIC BLOOD PRESSURE: 69 MMHG | SYSTOLIC BLOOD PRESSURE: 118 MMHG

## 2024-07-29 VITALS — SYSTOLIC BLOOD PRESSURE: 120 MMHG | OXYGEN SATURATION: 95 % | TEMPERATURE: 97.3 F | DIASTOLIC BLOOD PRESSURE: 72 MMHG

## 2024-07-29 VITALS — DIASTOLIC BLOOD PRESSURE: 77 MMHG | SYSTOLIC BLOOD PRESSURE: 123 MMHG

## 2024-07-29 VITALS — SYSTOLIC BLOOD PRESSURE: 121 MMHG | OXYGEN SATURATION: 93 % | DIASTOLIC BLOOD PRESSURE: 69 MMHG | TEMPERATURE: 96.8 F

## 2024-07-29 VITALS — TEMPERATURE: 97.3 F | DIASTOLIC BLOOD PRESSURE: 77 MMHG | SYSTOLIC BLOOD PRESSURE: 120 MMHG | OXYGEN SATURATION: 93 %

## 2024-07-29 VITALS — SYSTOLIC BLOOD PRESSURE: 112 MMHG | TEMPERATURE: 96.8 F | OXYGEN SATURATION: 93 % | DIASTOLIC BLOOD PRESSURE: 70 MMHG

## 2024-07-29 VITALS — OXYGEN SATURATION: 84 %

## 2024-07-29 VITALS — OXYGEN SATURATION: 89 %

## 2024-07-29 VITALS — OXYGEN SATURATION: 92 %

## 2024-07-29 LAB
BASOPHILS # BLD AUTO: 0 10^3/UL (ref 0–0.2)
BASOPHILS NFR BLD AUTO: 0.2 % (ref 0–1)
BUN SERPL-MCNC: 44 MG/DL (ref 9–23)
BUN SERPL-MCNC: 45 MG/DL (ref 9–23)
CA-I BLD-MCNC: 4.1 MG/DL (ref 4.5–5.3)
CALCIUM SERPL-MCNC: 8.8 MG/DL (ref 8.3–10.6)
CALCIUM SERPL-MCNC: 9.5 MG/DL (ref 8.3–10.6)
CHLORIDE SERPL-SCNC: 95 MMOL/L (ref 98–107)
CHLORIDE SERPL-SCNC: 99 MMOL/L (ref 98–107)
CO2 SERPL-SCNC: 38 MMOL/L (ref 20–31)
CO2 SERPL-SCNC: > 40 MMOL/L (ref 20–31)
CREAT SERPL-MCNC: 1.59 MG/DL (ref 0.55–1.3)
CREAT SERPL-MCNC: 1.65 MG/DL (ref 0.55–1.3)
EOSINOPHIL # BLD AUTO: 0 10^3/UL (ref 0–0.5)
EOSINOPHIL NFR BLD AUTO: 0.1 % (ref 0–3)
GFR SERPL CREATININE-BSD FRML MDRD: 32 ML/MIN/{1.73_M2} (ref 39–?)
GFR SERPL CREATININE-BSD FRML MDRD: 33.4 ML/MIN/{1.73_M2} (ref 39–?)
GLUCOSE SERPL-MCNC: 128 MG/DL (ref 74–106)
GLUCOSE SERPL-MCNC: 85 MG/DL (ref 74–106)
HCT VFR BLD AUTO: 46.4 % (ref 36–47)
HGB BLD-MCNC: 14.7 G/DL (ref 12–15.5)
LYMPHOCYTES # BLD AUTO: 1 10^3/UL (ref 1.5–5)
LYMPHOCYTES NFR BLD AUTO: 9.3 % (ref 24–44)
MAGNESIUM SERPL-MCNC: 1.7 MG/DL (ref 1.8–2.4)
MCH RBC QN AUTO: 31.2 PG (ref 27–33)
MCHC RBC AUTO-ENTMCNC: 31.7 G/DL (ref 32–36.5)
MCV RBC AUTO: 98.5 FL (ref 80–96)
MONOCYTES # BLD AUTO: 1.4 10^3/UL (ref 0–0.8)
MONOCYTES NFR BLD AUTO: 12.4 % (ref 2–8)
NEUTROPHILS # BLD AUTO: 8.6 10^3/UL (ref 1.5–8.5)
NEUTROPHILS NFR BLD AUTO: 77.5 % (ref 36–66)
PLATELET # BLD AUTO: 239 10^3/UL (ref 150–450)
POTASSIUM SERPL-SCNC: 4.2 MMOL/L (ref 3.5–5.1)
POTASSIUM SERPL-SCNC: 4.3 MMOL/L (ref 3.5–5.1)
RBC # BLD AUTO: 4.71 10^6/UL (ref 4–5.4)
SODIUM SERPL-SCNC: 138 MMOL/L (ref 136–145)
SODIUM SERPL-SCNC: 138 MMOL/L (ref 136–145)
T3FREE SERPL-MCNC: 1.8 PG/ML (ref 2.3–4.2)
T4 FREE SERPL-MCNC: 0.93 NG/DL (ref 0.89–1.76)
TSH SERPL DL<=0.005 MIU/L-ACNC: 3.19 UIU/ML (ref 0.55–4.78)
WBC # BLD AUTO: 11.1 10^3/UL (ref 4–10)

## 2024-07-29 RX ADMIN — FUROSEMIDE ONE MG: 10 INJECTION, SOLUTION INTRAMUSCULAR; INTRAVENOUS at 13:12

## 2024-07-29 RX ADMIN — MIDODRINE HYDROCHLORIDE SCH MG: 5 TABLET ORAL at 08:00

## 2024-07-29 RX ADMIN — METOPROLOL SUCCINATE SCH MG: 25 TABLET, EXTENDED RELEASE ORAL at 11:55

## 2024-07-29 RX ADMIN — FUROSEMIDE SCH MG: 10 INJECTION, SOLUTION INTRAMUSCULAR; INTRAVENOUS at 16:49

## 2024-07-29 RX ADMIN — FUROSEMIDE SCH MG: 10 INJECTION, SOLUTION INTRAMUSCULAR; INTRAVENOUS at 08:34

## 2024-07-30 VITALS — DIASTOLIC BLOOD PRESSURE: 71 MMHG | SYSTOLIC BLOOD PRESSURE: 101 MMHG | TEMPERATURE: 97 F | OXYGEN SATURATION: 91 %

## 2024-07-30 VITALS — OXYGEN SATURATION: 90 %

## 2024-07-30 VITALS — SYSTOLIC BLOOD PRESSURE: 112 MMHG | OXYGEN SATURATION: 92 % | DIASTOLIC BLOOD PRESSURE: 67 MMHG | TEMPERATURE: 97.2 F

## 2024-07-30 VITALS — OXYGEN SATURATION: 94 % | TEMPERATURE: 97.3 F | SYSTOLIC BLOOD PRESSURE: 116 MMHG | DIASTOLIC BLOOD PRESSURE: 63 MMHG

## 2024-07-30 VITALS — OXYGEN SATURATION: 89 %

## 2024-07-30 VITALS — OXYGEN SATURATION: 91 %

## 2024-07-30 LAB
BASE EXCESS BLDA CALC-SCNC: 15.3 MMOL/L (ref -2–2)
BASOPHILS # BLD AUTO: 0 10^3/UL (ref 0–0.2)
BASOPHILS NFR BLD AUTO: 0.2 % (ref 0–1)
BUN SERPL-MCNC: 46 MG/DL (ref 9–23)
CALCIUM SERPL-MCNC: 9.2 MG/DL (ref 8.3–10.6)
CHLORIDE SERPL-SCNC: 93 MMOL/L (ref 98–107)
CO2 BLDA CALC-SCNC: 49 MMOL/L (ref 23–31)
CO2 SERPL-SCNC: > 40 MMOL/L (ref 20–31)
CREAT SERPL-MCNC: 1.69 MG/DL (ref 0.55–1.3)
EOSINOPHIL # BLD AUTO: 0 10^3/UL (ref 0–0.5)
EOSINOPHIL NFR BLD AUTO: 0 % (ref 0–3)
GFR SERPL CREATININE-BSD FRML MDRD: 31.2 ML/MIN/{1.73_M2} (ref 39–?)
GLUCOSE SERPL-MCNC: 106 MG/DL (ref 74–106)
HCO3 BLDA-SCNC: 46.3 MMOL/L (ref 22–26)
HCO3 STD BLDA-SCNC: 39.3 MMOL/L. (ref 22–26)
HCT VFR BLD AUTO: 50.7 % (ref 36–47)
HGB BLD-MCNC: 15.8 G/DL (ref 12–15.5)
LYMPHOCYTES # BLD AUTO: 0.6 10^3/UL (ref 1.5–5)
LYMPHOCYTES NFR BLD AUTO: 5.3 % (ref 24–44)
MAGNESIUM SERPL-MCNC: 1.7 MG/DL (ref 1.8–2.4)
MCH RBC QN AUTO: 31.2 PG (ref 27–33)
MCHC RBC AUTO-ENTMCNC: 31.2 G/DL (ref 32–36.5)
MCV RBC AUTO: 100.2 FL (ref 80–96)
MONOCYTES # BLD AUTO: 0.9 10^3/UL (ref 0–0.8)
MONOCYTES NFR BLD AUTO: 7.8 % (ref 2–8)
NEUTROPHILS # BLD AUTO: 9.7 10^3/UL (ref 1.5–8.5)
NEUTROPHILS NFR BLD AUTO: 86.2 % (ref 36–66)
PCO2 BLDA: 86.4 MMHG (ref 35–45)
PH BLDA: 7.35 UNITS (ref 7.35–7.45)
PLATELET # BLD AUTO: 246 10^3/UL (ref 150–450)
PO2 BLDA: 83 MMHG (ref 75–100)
POTASSIUM SERPL-SCNC: 4 MMOL/L (ref 3.5–5.1)
RBC # BLD AUTO: 5.06 10^6/UL (ref 4–5.4)
SAO2 % BLDA: 95.9 % (ref 95–99)
SODIUM SERPL-SCNC: 142 MMOL/L (ref 136–145)
WBC # BLD AUTO: 11.2 10^3/UL (ref 4–10)

## 2024-07-30 RX ADMIN — IPRATROPIUM BROMIDE AND ALBUTEROL SULFATE SCH ML: .5; 3 SOLUTION RESPIRATORY (INHALATION) at 11:18

## 2024-07-30 RX ADMIN — MAGNESIUM SULFATE IN DEXTROSE ONE MLS/HR: 10 INJECTION, SOLUTION INTRAVENOUS at 12:21

## 2024-07-31 VITALS — OXYGEN SATURATION: 92 %

## 2024-07-31 VITALS — DIASTOLIC BLOOD PRESSURE: 83 MMHG | OXYGEN SATURATION: 96 % | TEMPERATURE: 97.3 F | SYSTOLIC BLOOD PRESSURE: 134 MMHG

## 2024-07-31 VITALS — OXYGEN SATURATION: 91 % | SYSTOLIC BLOOD PRESSURE: 129 MMHG | DIASTOLIC BLOOD PRESSURE: 81 MMHG | TEMPERATURE: 97.3 F

## 2024-07-31 VITALS — SYSTOLIC BLOOD PRESSURE: 127 MMHG | TEMPERATURE: 97.3 F | OXYGEN SATURATION: 94 % | DIASTOLIC BLOOD PRESSURE: 79 MMHG

## 2024-07-31 VITALS — TEMPERATURE: 97.5 F | OXYGEN SATURATION: 93 % | DIASTOLIC BLOOD PRESSURE: 90 MMHG | SYSTOLIC BLOOD PRESSURE: 121 MMHG

## 2024-07-31 VITALS — SYSTOLIC BLOOD PRESSURE: 124 MMHG | DIASTOLIC BLOOD PRESSURE: 81 MMHG

## 2024-07-31 LAB
BASE EXCESS BLDA CALC-SCNC: 11.5 MMOL/L (ref -2–2)
BASOPHILS # BLD AUTO: 0 10^3/UL (ref 0–0.2)
BASOPHILS NFR BLD AUTO: 0.2 % (ref 0–1)
BUN SERPL-MCNC: 49 MG/DL (ref 9–23)
CALCIUM SERPL-MCNC: 9.6 MG/DL (ref 8.3–10.6)
CHLORIDE SERPL-SCNC: 92 MMOL/L (ref 98–107)
CO2 BLDA CALC-SCNC: 41.5 MMOL/L (ref 23–31)
CO2 SERPL-SCNC: > 40 MMOL/L (ref 20–31)
CREAT SERPL-MCNC: 1.66 MG/DL (ref 0.55–1.3)
EOSINOPHIL # BLD AUTO: 0 10^3/UL (ref 0–0.5)
EOSINOPHIL NFR BLD AUTO: 0.2 % (ref 0–3)
GFR SERPL CREATININE-BSD FRML MDRD: 31.8 ML/MIN/{1.73_M2} (ref 39–?)
GLUCOSE SERPL-MCNC: 92 MG/DL (ref 74–106)
HCO3 BLDA-SCNC: 39.5 MMOL/L (ref 22–26)
HCO3 STD BLDA-SCNC: 35.4 MMOL/L. (ref 22–26)
HCT VFR BLD AUTO: 49.2 % (ref 36–47)
HGB BLD-MCNC: 15.7 G/DL (ref 12–15.5)
LYMPHOCYTES # BLD AUTO: 1.2 10^3/UL (ref 1.5–5)
LYMPHOCYTES NFR BLD AUTO: 8.7 % (ref 24–44)
MCH RBC QN AUTO: 31.1 PG (ref 27–33)
MCHC RBC AUTO-ENTMCNC: 31.9 G/DL (ref 32–36.5)
MCV RBC AUTO: 97.4 FL (ref 80–96)
MONOCYTES # BLD AUTO: 1.4 10^3/UL (ref 0–0.8)
MONOCYTES NFR BLD AUTO: 10.3 % (ref 2–8)
NEUTROPHILS # BLD AUTO: 10.6 10^3/UL (ref 1.5–8.5)
NEUTROPHILS NFR BLD AUTO: 80.1 % (ref 36–66)
PCO2 BLDA: 64.5 MMHG (ref 35–45)
PH BLDA: 7.41 UNITS (ref 7.35–7.45)
PLATELET # BLD AUTO: 236 10^3/UL (ref 150–450)
PO2 BLDA: 123.6 MMHG (ref 75–100)
POTASSIUM SERPL-SCNC: 3.7 MMOL/L (ref 3.5–5.1)
RBC # BLD AUTO: 5.05 10^6/UL (ref 4–5.4)
S PNEUM AG UR QL: NOT DETECTED
SAO2 % BLDA: 98.6 % (ref 95–99)
SODIUM SERPL-SCNC: 141 MMOL/L (ref 136–145)
WBC # BLD AUTO: 13.3 10^3/UL (ref 4–10)

## 2024-07-31 RX ADMIN — METOPROLOL SUCCINATE SCH MG: 25 TABLET, EXTENDED RELEASE ORAL at 08:36

## 2024-07-31 RX ADMIN — MAGNESIUM OXIDE SCH MG: 400 TABLET ORAL at 08:35

## 2024-08-01 VITALS — TEMPERATURE: 97.7 F | DIASTOLIC BLOOD PRESSURE: 80 MMHG | SYSTOLIC BLOOD PRESSURE: 141 MMHG | OXYGEN SATURATION: 90 %

## 2024-08-01 VITALS — OXYGEN SATURATION: 94 %

## 2024-08-01 VITALS — DIASTOLIC BLOOD PRESSURE: 77 MMHG | SYSTOLIC BLOOD PRESSURE: 117 MMHG | TEMPERATURE: 96.9 F

## 2024-08-01 VITALS — SYSTOLIC BLOOD PRESSURE: 136 MMHG | OXYGEN SATURATION: 95 % | DIASTOLIC BLOOD PRESSURE: 60 MMHG | TEMPERATURE: 97.5 F

## 2024-08-01 LAB
BASOPHILS # BLD AUTO: 0 10^3/UL (ref 0–0.2)
BASOPHILS NFR BLD AUTO: 0.2 % (ref 0–1)
BUN SERPL-MCNC: 51 MG/DL (ref 9–23)
CALCIUM SERPL-MCNC: 9.4 MG/DL (ref 8.3–10.6)
CHLORIDE SERPL-SCNC: 90 MMOL/L (ref 98–107)
CO2 SERPL-SCNC: 40 MMOL/L (ref 20–31)
CREAT SERPL-MCNC: 1.5 MG/DL (ref 0.55–1.3)
EOSINOPHIL # BLD AUTO: 0 10^3/UL (ref 0–0.5)
EOSINOPHIL NFR BLD AUTO: 0.1 % (ref 0–3)
GFR SERPL CREATININE-BSD FRML MDRD: 35.8 ML/MIN/{1.73_M2} (ref 39–?)
GLUCOSE SERPL-MCNC: 105 MG/DL (ref 74–106)
HCT VFR BLD AUTO: 50.3 % (ref 36–47)
HGB BLD-MCNC: 16 G/DL (ref 12–15.5)
LYMPHOCYTES # BLD AUTO: 0.8 10^3/UL (ref 1.5–5)
LYMPHOCYTES NFR BLD AUTO: 5.8 % (ref 24–44)
MCH RBC QN AUTO: 31.2 PG (ref 27–33)
MCHC RBC AUTO-ENTMCNC: 31.8 G/DL (ref 32–36.5)
MCV RBC AUTO: 98.1 FL (ref 80–96)
MONOCYTES # BLD AUTO: 1.2 10^3/UL (ref 0–0.8)
MONOCYTES NFR BLD AUTO: 9.2 % (ref 2–8)
NEUTROPHILS # BLD AUTO: 11.1 10^3/UL (ref 1.5–8.5)
NEUTROPHILS NFR BLD AUTO: 84 % (ref 36–66)
PLATELET # BLD AUTO: 231 10^3/UL (ref 150–450)
POTASSIUM SERPL-SCNC: 3.8 MMOL/L (ref 3.5–5.1)
RBC # BLD AUTO: 5.13 10^6/UL (ref 4–5.4)
SODIUM SERPL-SCNC: 137 MMOL/L (ref 136–145)
WBC # BLD AUTO: 13.2 10^3/UL (ref 4–10)

## 2024-08-02 VITALS — OXYGEN SATURATION: 93 % | TEMPERATURE: 97.2 F | SYSTOLIC BLOOD PRESSURE: 120 MMHG | DIASTOLIC BLOOD PRESSURE: 77 MMHG

## 2024-08-02 VITALS — SYSTOLIC BLOOD PRESSURE: 115 MMHG | OXYGEN SATURATION: 92 % | TEMPERATURE: 97.3 F | DIASTOLIC BLOOD PRESSURE: 75 MMHG

## 2024-08-02 VITALS — OXYGEN SATURATION: 92 % | TEMPERATURE: 96.9 F | DIASTOLIC BLOOD PRESSURE: 89 MMHG | SYSTOLIC BLOOD PRESSURE: 140 MMHG

## 2024-08-03 VITALS — TEMPERATURE: 97 F | DIASTOLIC BLOOD PRESSURE: 83 MMHG | SYSTOLIC BLOOD PRESSURE: 125 MMHG | OXYGEN SATURATION: 95 %

## 2024-08-03 VITALS — SYSTOLIC BLOOD PRESSURE: 110 MMHG | TEMPERATURE: 97.3 F | OXYGEN SATURATION: 94 % | DIASTOLIC BLOOD PRESSURE: 64 MMHG

## 2024-08-03 VITALS — TEMPERATURE: 97.3 F | SYSTOLIC BLOOD PRESSURE: 112 MMHG | DIASTOLIC BLOOD PRESSURE: 71 MMHG | OXYGEN SATURATION: 93 %

## 2024-08-04 VITALS — TEMPERATURE: 97.7 F | OXYGEN SATURATION: 94 % | DIASTOLIC BLOOD PRESSURE: 72 MMHG | SYSTOLIC BLOOD PRESSURE: 119 MMHG

## 2024-08-04 VITALS — TEMPERATURE: 97.5 F | DIASTOLIC BLOOD PRESSURE: 74 MMHG | OXYGEN SATURATION: 92 % | SYSTOLIC BLOOD PRESSURE: 116 MMHG

## 2024-08-04 VITALS — SYSTOLIC BLOOD PRESSURE: 118 MMHG | TEMPERATURE: 97.7 F | OXYGEN SATURATION: 97 % | DIASTOLIC BLOOD PRESSURE: 76 MMHG

## 2024-08-05 VITALS — OXYGEN SATURATION: 93 % | DIASTOLIC BLOOD PRESSURE: 74 MMHG | SYSTOLIC BLOOD PRESSURE: 139 MMHG | TEMPERATURE: 97.7 F

## 2024-08-05 VITALS — TEMPERATURE: 98.6 F | SYSTOLIC BLOOD PRESSURE: 122 MMHG | OXYGEN SATURATION: 95 % | DIASTOLIC BLOOD PRESSURE: 73 MMHG

## 2024-08-05 VITALS — DIASTOLIC BLOOD PRESSURE: 70 MMHG | OXYGEN SATURATION: 93 % | SYSTOLIC BLOOD PRESSURE: 121 MMHG | TEMPERATURE: 97.2 F

## 2024-08-05 RX ADMIN — FUROSEMIDE ONE MG: 20 TABLET ORAL at 11:52

## 2024-08-06 VITALS — DIASTOLIC BLOOD PRESSURE: 93 MMHG | OXYGEN SATURATION: 92 % | TEMPERATURE: 97.9 F | SYSTOLIC BLOOD PRESSURE: 134 MMHG

## 2024-08-06 VITALS — TEMPERATURE: 97.5 F | SYSTOLIC BLOOD PRESSURE: 115 MMHG | DIASTOLIC BLOOD PRESSURE: 79 MMHG | OXYGEN SATURATION: 94 %

## 2024-08-06 VITALS — SYSTOLIC BLOOD PRESSURE: 141 MMHG | TEMPERATURE: 97.6 F | OXYGEN SATURATION: 96 % | DIASTOLIC BLOOD PRESSURE: 76 MMHG

## 2024-08-06 LAB
ALBUMIN SERPL BCG-MCNC: 3.6 G/DL (ref 3.2–5.2)
ALP SERPL-CCNC: 76 U/L (ref 46–116)
ALT SERPL W P-5'-P-CCNC: 14 U/L (ref 7–40)
AST SERPL-CCNC: 26 U/L (ref ?–34)
BASE EXCESS BLDA CALC-SCNC: 7.7 MMOL/L (ref -2–2)
BASOPHILS # BLD AUTO: 0.2 10^3/UL (ref 0–0.2)
BASOPHILS NFR BLD AUTO: 1.1 % (ref 0–1)
BILIRUB SERPL-MCNC: 1.1 MG/DL (ref 0.3–1.2)
BUN SERPL-MCNC: 43 MG/DL (ref 9–23)
CALCIUM SERPL-MCNC: 9.2 MG/DL (ref 8.3–10.6)
CHLORIDE SERPL-SCNC: 97 MMOL/L (ref 98–107)
CO2 BLDA CALC-SCNC: 34 MMOL/L (ref 23–31)
CO2 SERPL-SCNC: 37 MMOL/L (ref 20–31)
CREAT SERPL-MCNC: 1.42 MG/DL (ref 0.55–1.3)
EOSINOPHIL # BLD AUTO: 0.2 10^3/UL (ref 0–0.5)
EOSINOPHIL NFR BLD AUTO: 1.2 % (ref 0–3)
GFR SERPL CREATININE-BSD FRML MDRD: 38.1 ML/MIN/{1.73_M2} (ref 39–?)
GLUCOSE SERPL-MCNC: 179 MG/DL (ref 74–106)
HCO3 BLDA-SCNC: 32.6 MMOL/L (ref 22–26)
HCO3 STD BLDA-SCNC: 31.5 MMOL/L. (ref 22–26)
HCT VFR BLD AUTO: 51.7 % (ref 36–47)
HGB BLD-MCNC: 15.8 G/DL (ref 12–15.5)
LYMPHOCYTES # BLD AUTO: 1.7 10^3/UL (ref 1.5–5)
LYMPHOCYTES NFR BLD AUTO: 11.7 % (ref 24–44)
MCH RBC QN AUTO: 31.2 PG (ref 27–33)
MCHC RBC AUTO-ENTMCNC: 30.6 G/DL (ref 32–36.5)
MCV RBC AUTO: 102.2 FL (ref 80–96)
MONOCYTES # BLD AUTO: 1.3 10^3/UL (ref 0–0.8)
MONOCYTES NFR BLD AUTO: 8.7 % (ref 2–8)
NEUTROPHILS # BLD AUTO: 10.6 10^3/UL (ref 1.5–8.5)
NEUTROPHILS NFR BLD AUTO: 72.8 % (ref 36–66)
PCO2 BLDA: 45.7 MMHG (ref 35–45)
PH BLDA: 7.47 UNITS (ref 7.35–7.45)
PLATELET # BLD AUTO: 222 10^3/UL (ref 150–450)
PO2 BLDA: 86 MMHG (ref 75–100)
POTASSIUM SERPL-SCNC: 4.2 MMOL/L (ref 3.5–5.1)
PROT SERPL-MCNC: 6.6 G/DL (ref 5.7–8.2)
RBC # BLD AUTO: 5.06 10^6/UL (ref 4–5.4)
SAO2 % BLDA: 96.8 % (ref 95–99)
SODIUM SERPL-SCNC: 139 MMOL/L (ref 136–145)
WBC # BLD AUTO: 14.6 10^3/UL (ref 4–10)

## 2024-08-07 VITALS — OXYGEN SATURATION: 91 % | TEMPERATURE: 97.3 F | DIASTOLIC BLOOD PRESSURE: 73 MMHG | SYSTOLIC BLOOD PRESSURE: 130 MMHG

## 2024-08-07 VITALS — DIASTOLIC BLOOD PRESSURE: 70 MMHG | OXYGEN SATURATION: 93 % | TEMPERATURE: 97.5 F | SYSTOLIC BLOOD PRESSURE: 115 MMHG

## 2024-08-07 VITALS — SYSTOLIC BLOOD PRESSURE: 116 MMHG | TEMPERATURE: 97.2 F | DIASTOLIC BLOOD PRESSURE: 72 MMHG | OXYGEN SATURATION: 93 %

## 2024-08-08 VITALS — DIASTOLIC BLOOD PRESSURE: 71 MMHG | TEMPERATURE: 97.3 F | OXYGEN SATURATION: 93 % | SYSTOLIC BLOOD PRESSURE: 114 MMHG

## 2024-08-08 VITALS — DIASTOLIC BLOOD PRESSURE: 63 MMHG | TEMPERATURE: 97.3 F | SYSTOLIC BLOOD PRESSURE: 109 MMHG | OXYGEN SATURATION: 93 %

## 2024-08-08 VITALS — OXYGEN SATURATION: 90 % | DIASTOLIC BLOOD PRESSURE: 70 MMHG | SYSTOLIC BLOOD PRESSURE: 117 MMHG | TEMPERATURE: 97.2 F

## 2024-08-08 LAB
BASOPHILS # BLD AUTO: 0.1 10^3/UL (ref 0–0.2)
BASOPHILS NFR BLD AUTO: 0.9 % (ref 0–1)
BUN SERPL-MCNC: 37 MG/DL (ref 9–23)
CALCIUM SERPL-MCNC: 9.3 MG/DL (ref 8.3–10.6)
CHLORIDE SERPL-SCNC: 98 MMOL/L (ref 98–107)
CO2 SERPL-SCNC: 36 MMOL/L (ref 20–31)
CREAT SERPL-MCNC: 1.48 MG/DL (ref 0.55–1.3)
CRP SERPL-MCNC: < 0.4 MG/DL (ref ?–1)
EOSINOPHIL # BLD AUTO: 0.1 10^3/UL (ref 0–0.5)
EOSINOPHIL NFR BLD AUTO: 0.6 % (ref 0–3)
ERYTHROCYTE [SEDIMENTATION RATE] IN BLOOD BY WESTERGREN METHOD: 13 MM/HR (ref 0–30)
GFR SERPL CREATININE-BSD FRML MDRD: 36.3 ML/MIN/{1.73_M2} (ref 39–?)
GLUCOSE SERPL-MCNC: 142 MG/DL (ref 74–106)
HCT VFR BLD AUTO: 47.2 % (ref 36–47)
HGB BLD-MCNC: 14.8 G/DL (ref 12–15.5)
LYMPHOCYTES # BLD AUTO: 0.7 10^3/UL (ref 1.5–5)
LYMPHOCYTES NFR BLD AUTO: 5.6 % (ref 24–44)
MCH RBC QN AUTO: 31.4 PG (ref 27–33)
MCHC RBC AUTO-ENTMCNC: 31.4 G/DL (ref 32–36.5)
MCV RBC AUTO: 100.2 FL (ref 80–96)
MONOCYTES # BLD AUTO: 0.8 10^3/UL (ref 0–0.8)
MONOCYTES NFR BLD AUTO: 6 % (ref 2–8)
NEUTROPHILS # BLD AUTO: 10.8 10^3/UL (ref 1.5–8.5)
NEUTROPHILS NFR BLD AUTO: 82 % (ref 36–66)
PLATELET # BLD AUTO: 234 10^3/UL (ref 150–450)
POTASSIUM SERPL-SCNC: 4.7 MMOL/L (ref 3.5–5.1)
PROCALCITONIN SERPL-MCNC: 0.08 NG/ML
RBC # BLD AUTO: 4.71 10^6/UL (ref 4–5.4)
SODIUM SERPL-SCNC: 136 MMOL/L (ref 136–145)
WBC # BLD AUTO: 13.2 10^3/UL (ref 4–10)

## 2024-08-09 VITALS — SYSTOLIC BLOOD PRESSURE: 122 MMHG | TEMPERATURE: 97.2 F | OXYGEN SATURATION: 87 % | DIASTOLIC BLOOD PRESSURE: 78 MMHG

## 2024-08-09 VITALS — OXYGEN SATURATION: 95 %

## 2024-08-09 VITALS — OXYGEN SATURATION: 93 %

## 2024-08-09 VITALS — TEMPERATURE: 97.7 F | OXYGEN SATURATION: 94 % | DIASTOLIC BLOOD PRESSURE: 65 MMHG | SYSTOLIC BLOOD PRESSURE: 105 MMHG

## 2024-11-06 ENCOUNTER — HOSPITAL ENCOUNTER (OUTPATIENT)
Dept: HOSPITAL 53 - M PLAIMG | Age: 79
End: 2024-11-06
Attending: INTERNAL MEDICINE
Payer: MEDICARE

## 2024-11-06 DIAGNOSIS — R91.8: Primary | ICD-10-CM

## 2024-12-02 ENCOUNTER — HOSPITAL ENCOUNTER (EMERGENCY)
Dept: HOSPITAL 53 - M ED | Age: 79
Discharge: HOME | End: 2024-12-02
Payer: MEDICARE

## 2024-12-02 VITALS — SYSTOLIC BLOOD PRESSURE: 122 MMHG | DIASTOLIC BLOOD PRESSURE: 78 MMHG | OXYGEN SATURATION: 94 %

## 2024-12-02 VITALS — TEMPERATURE: 97.2 F

## 2024-12-02 VITALS — HEIGHT: 60 IN | WEIGHT: 260.54 LBS | BODY MASS INDEX: 51.15 KG/M2

## 2024-12-02 DIAGNOSIS — Z79.52: ICD-10-CM

## 2024-12-02 DIAGNOSIS — I10: ICD-10-CM

## 2024-12-02 DIAGNOSIS — S22.050A: Primary | ICD-10-CM

## 2024-12-02 DIAGNOSIS — Z79.51: ICD-10-CM
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
## 2024-12-02 DIAGNOSIS — Z88.8: ICD-10-CM
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
## 2024-12-02 DIAGNOSIS — Z79.899: ICD-10-CM

## 2024-12-02 DIAGNOSIS — R94.31: ICD-10-CM
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
## 2024-12-02 DIAGNOSIS — Z79.1: ICD-10-CM

## 2024-12-02 LAB
ALBUMIN SERPL BCG-MCNC: 3.8 G/DL (ref 3.2–5.2)
ALP SERPL-CCNC: 80 U/L (ref 35–104)
ALT SERPL W P-5'-P-CCNC: 10 U/L (ref 7–40)
AST SERPL-CCNC: 30 U/L (ref ?–34)
BASE EXCESS BLDA CALC-SCNC: 6 MMOL/L (ref -2–2)
BASOPHILS # BLD AUTO: 0.1 10^3/UL (ref 0–0.2)
BASOPHILS NFR BLD AUTO: 0.7 % (ref 0–1)
BILIRUB CONJ SERPL-MCNC: 0.2 MG/DL (ref ?–0.4)
BILIRUB SERPL-MCNC: 0.6 MG/DL (ref 0.3–1.2)
BUN SERPL-MCNC: 29 MG/DL (ref 9–23)
CALCIUM SERPL-MCNC: 10.3 MG/DL (ref 8.3–10.6)
CHLORIDE SERPL-SCNC: 99 MMOL/L (ref 98–107)
CK MB CFR.DF SERPL CALC: 1.16
CK MB SERPL-MCNC: 1.2 NG/ML (ref ?–3.6)
CK SERPL-CCNC: 103 U/L (ref 34–145)
CO2 BLDA CALC-SCNC: 33 MMOL/L (ref 23–31)
CO2 SERPL-SCNC: 35 MMOL/L (ref 20–31)
CREAT SERPL-MCNC: 1.64 MG/DL (ref 0.55–1.3)
CRP SERPL-MCNC: 0.4 MG/DL (ref ?–1)
EOSINOPHIL # BLD AUTO: 0.1 10^3/UL (ref 0–0.5)
EOSINOPHIL NFR BLD AUTO: 1.1 % (ref 0–3)
GFR SERPL CREATININE-BSD FRML MDRD: 32.3 ML/MIN/{1.73_M2} (ref 39–?)
GLUCOSE SERPL-MCNC: 158 MG/DL (ref 74–106)
HCO3 BLDA-SCNC: 31.5 MMOL/L (ref 22–26)
HCO3 STD BLDA-SCNC: 29.8 MMOL/L. (ref 22–26)
HCT VFR BLD AUTO: 47.5 % (ref 36–47)
HGB BLD-MCNC: 15 G/DL (ref 12–15.5)
INR PPP: 1
LYMPHOCYTES # BLD AUTO: 1.3 10^3/UL (ref 1.5–5)
LYMPHOCYTES NFR BLD AUTO: 15.2 % (ref 24–44)
MCH RBC QN AUTO: 32.5 PG (ref 27–33)
MCHC RBC AUTO-ENTMCNC: 31.6 G/DL (ref 32–36.5)
MCV RBC AUTO: 103 FL (ref 80–96)
MONOCYTES # BLD AUTO: 0.4 10^3/UL (ref 0–0.8)
MONOCYTES NFR BLD AUTO: 4.5 % (ref 2–8)
NEUTROPHILS # BLD AUTO: 6.8 10^3/UL (ref 1.5–8.5)
NEUTROPHILS NFR BLD AUTO: 77.6 % (ref 36–66)
PCO2 BLDA: 48.5 MMHG (ref 35–45)
PH BLDA: 7.43 UNITS (ref 7.35–7.45)
PLATELET # BLD AUTO: 327 10^3/UL (ref 150–450)
PO2 BLDA: 78 MMHG (ref 75–100)
POTASSIUM SERPL-SCNC: 4.4 MMOL/L (ref 3.5–5.1)
PROT SERPL-MCNC: 7.8 G/DL (ref 5.7–8.2)
PROTHROMBIN TIME: 13.5 SECONDS (ref 12.5–14.5)
RBC # BLD AUTO: 4.61 10^6/UL (ref 4–5.4)
SAO2 % BLDA: 96.1 % (ref 95–99)
SODIUM SERPL-SCNC: 140 MMOL/L (ref 136–145)
T4 SERPL-MCNC: 8.6 UG/DL (ref 4.5–10.9)
TSH SERPL DL<=0.005 MIU/L-ACNC: 3.9 UIU/ML (ref 0.55–4.78)
WBC # BLD AUTO: 8.7 10^3/UL (ref 4–10)

## 2024-12-02 PROCEDURE — 93041 RHYTHM ECG TRACING: CPT

## 2024-12-02 PROCEDURE — 86140 C-REACTIVE PROTEIN: CPT

## 2024-12-02 PROCEDURE — 80047 BASIC METABLC PNL IONIZED CA: CPT

## 2024-12-02 PROCEDURE — 84436 ASSAY OF TOTAL THYROXINE: CPT

## 2024-12-02 PROCEDURE — 82803 BLOOD GASES ANY COMBINATION: CPT

## 2024-12-02 PROCEDURE — 80076 HEPATIC FUNCTION PANEL: CPT

## 2024-12-02 PROCEDURE — 36600 WITHDRAWAL OF ARTERIAL BLOOD: CPT

## 2024-12-02 PROCEDURE — 36415 COLL VENOUS BLD VENIPUNCTURE: CPT

## 2024-12-02 PROCEDURE — 87581 M.PNEUMON DNA AMP PROBE: CPT

## 2024-12-02 PROCEDURE — 85610 PROTHROMBIN TIME: CPT

## 2024-12-02 PROCEDURE — 87040 BLOOD CULTURE FOR BACTERIA: CPT

## 2024-12-02 PROCEDURE — 80048 BASIC METABOLIC PNL TOTAL CA: CPT

## 2024-12-02 PROCEDURE — 72146 MRI CHEST SPINE W/O DYE: CPT

## 2024-12-02 PROCEDURE — 94760 N-INVAS EAR/PLS OXIMETRY 1: CPT

## 2024-12-02 PROCEDURE — 72072 X-RAY EXAM THORAC SPINE 3VWS: CPT

## 2024-12-02 PROCEDURE — 71045 X-RAY EXAM CHEST 1 VIEW: CPT

## 2024-12-02 PROCEDURE — 93005 ELECTROCARDIOGRAM TRACING: CPT

## 2024-12-02 PROCEDURE — 84443 ASSAY THYROID STIM HORMONE: CPT

## 2024-12-02 PROCEDURE — 87486 CHLMYD PNEUM DNA AMP PROBE: CPT

## 2024-12-02 PROCEDURE — 85025 COMPLETE CBC W/AUTO DIFF WBC: CPT

## 2024-12-02 PROCEDURE — 82553 CREATINE MB FRACTION: CPT

## 2024-12-02 PROCEDURE — 87798 DETECT AGENT NOS DNA AMP: CPT

## 2024-12-02 PROCEDURE — 82550 ASSAY OF CK (CPK): CPT

## 2024-12-02 PROCEDURE — 87633 RESP VIRUS 12-25 TARGETS: CPT

## 2024-12-02 PROCEDURE — 84484 ASSAY OF TROPONIN QUANT: CPT

## 2024-12-02 PROCEDURE — 83880 ASSAY OF NATRIURETIC PEPTIDE: CPT

## 2024-12-02 PROCEDURE — 99285 EMERGENCY DEPT VISIT HI MDM: CPT

## 2024-12-02 RX ADMIN — HYDROCODONE BITARTRATE AND ACETAMINOPHEN ONE TAB: 5; 325 TABLET ORAL at 14:57

## 2024-12-02 RX ADMIN — HYDROCODONE BITARTRATE AND ACETAMINOPHEN ONE TAB: 5; 325 TABLET ORAL at 21:39

## 2024-12-24 ENCOUNTER — HOSPITAL ENCOUNTER (OUTPATIENT)
Dept: HOSPITAL 53 - M PLALAB | Age: 79
End: 2024-12-24
Attending: NURSE PRACTITIONER
Payer: MEDICARE

## 2024-12-24 DIAGNOSIS — I10: Primary | ICD-10-CM

## 2024-12-24 DIAGNOSIS — R73.03: ICD-10-CM

## 2024-12-24 LAB
ALBUMIN SERPL BCG-MCNC: 3.8 G/DL (ref 3.2–5.2)
ALP SERPL-CCNC: 84 U/L (ref 35–104)
ALT SERPL W P-5'-P-CCNC: < 9 U/L (ref 7–40)
AST SERPL-CCNC: 25 U/L (ref ?–34)
BASOPHILS # BLD AUTO: 0.1 10^3/UL (ref 0–0.2)
BASOPHILS NFR BLD AUTO: 0.6 % (ref 0–1)
BILIRUB SERPL-MCNC: 0.5 MG/DL (ref 0.3–1.2)
BUN SERPL-MCNC: 33 MG/DL (ref 9–23)
CALCIUM SERPL-MCNC: 9.7 MG/DL (ref 8.3–10.6)
CHLORIDE SERPL-SCNC: 100 MMOL/L (ref 98–107)
CO2 SERPL-SCNC: 32 MMOL/L (ref 20–31)
CREAT SERPL-MCNC: 1.55 MG/DL (ref 0.55–1.3)
EOSINOPHIL # BLD AUTO: 0.3 10^3/UL (ref 0–0.5)
EOSINOPHIL NFR BLD AUTO: 2.4 % (ref 0–3)
EST. AVERAGE GLUCOSE BLD GHB EST-MCNC: 114 MG/DL (ref 60–110)
GFR SERPL CREATININE-BSD FRML MDRD: 34.4 ML/MIN/{1.73_M2} (ref 39–?)
GLUCOSE SERPL-MCNC: 116 MG/DL (ref 74–106)
HCT VFR BLD AUTO: 46.7 % (ref 36–47)
HGB BLD-MCNC: 14.5 G/DL (ref 12–15.5)
LYMPHOCYTES # BLD AUTO: 1.4 10^3/UL (ref 1.5–5)
LYMPHOCYTES NFR BLD AUTO: 13.3 % (ref 24–44)
MCH RBC QN AUTO: 31.9 PG (ref 27–33)
MCHC RBC AUTO-ENTMCNC: 31 G/DL (ref 32–36.5)
MCV RBC AUTO: 102.9 FL (ref 80–96)
MONOCYTES # BLD AUTO: 0.9 10^3/UL (ref 0–0.8)
MONOCYTES NFR BLD AUTO: 8 % (ref 2–8)
NEUTROPHILS # BLD AUTO: 8 10^3/UL (ref 1.5–8.5)
NEUTROPHILS NFR BLD AUTO: 74.7 % (ref 36–66)
PLATELET # BLD AUTO: 332 10^3/UL (ref 150–450)
POTASSIUM SERPL-SCNC: 4.6 MMOL/L (ref 3.5–5.1)
PROT SERPL-MCNC: 7.4 G/DL (ref 5.7–8.2)
RBC # BLD AUTO: 4.54 10^6/UL (ref 4–5.4)
SODIUM SERPL-SCNC: 141 MMOL/L (ref 136–145)
WBC # BLD AUTO: 10.7 10^3/UL (ref 4–10)

## 2025-01-22 ENCOUNTER — HOSPITAL ENCOUNTER (OUTPATIENT)
Dept: HOSPITAL 53 - M PLAIMG | Age: 80
End: 2025-01-22
Attending: NURSE PRACTITIONER
Payer: MEDICARE

## 2025-01-22 DIAGNOSIS — M19.90: Primary | ICD-10-CM

## 2025-07-04 ENCOUNTER — HOSPITAL ENCOUNTER (INPATIENT)
Dept: HOSPITAL 53 - M ED | Age: 80
LOS: 5 days | DRG: 871 | End: 2025-07-09
Attending: INTERNAL MEDICINE | Admitting: INTERNAL MEDICINE
Payer: MEDICARE

## 2025-07-04 VITALS — SYSTOLIC BLOOD PRESSURE: 118 MMHG | DIASTOLIC BLOOD PRESSURE: 65 MMHG | TEMPERATURE: 96.8 F | OXYGEN SATURATION: 93 %

## 2025-07-04 VITALS — WEIGHT: 214.73 LBS | BODY MASS INDEX: 35.78 KG/M2 | HEIGHT: 65 IN

## 2025-07-04 VITALS — SYSTOLIC BLOOD PRESSURE: 108 MMHG | OXYGEN SATURATION: 95 % | DIASTOLIC BLOOD PRESSURE: 70 MMHG | TEMPERATURE: 97 F

## 2025-07-04 VITALS — OXYGEN SATURATION: 96 %

## 2025-07-04 DIAGNOSIS — Z99.81: ICD-10-CM

## 2025-07-04 DIAGNOSIS — N39.0: ICD-10-CM

## 2025-07-04 DIAGNOSIS — N18.30: ICD-10-CM

## 2025-07-04 DIAGNOSIS — Z66: ICD-10-CM

## 2025-07-04 DIAGNOSIS — E11.22: ICD-10-CM

## 2025-07-04 DIAGNOSIS — J96.21: ICD-10-CM

## 2025-07-04 DIAGNOSIS — Z79.899: ICD-10-CM

## 2025-07-04 DIAGNOSIS — I45.10: ICD-10-CM

## 2025-07-04 DIAGNOSIS — Z79.52: ICD-10-CM

## 2025-07-04 DIAGNOSIS — G47.33: ICD-10-CM

## 2025-07-04 DIAGNOSIS — E43: ICD-10-CM

## 2025-07-04 DIAGNOSIS — N17.9: ICD-10-CM

## 2025-07-04 DIAGNOSIS — A41.9: Primary | ICD-10-CM

## 2025-07-04 DIAGNOSIS — J18.9: ICD-10-CM

## 2025-07-04 DIAGNOSIS — K44.9: ICD-10-CM

## 2025-07-04 DIAGNOSIS — I13.0: ICD-10-CM

## 2025-07-04 DIAGNOSIS — I27.23: ICD-10-CM

## 2025-07-04 DIAGNOSIS — I25.10: ICD-10-CM

## 2025-07-04 DIAGNOSIS — M19.90: ICD-10-CM

## 2025-07-04 DIAGNOSIS — I50.810: ICD-10-CM

## 2025-07-04 DIAGNOSIS — E87.29: ICD-10-CM

## 2025-07-04 DIAGNOSIS — J96.22: ICD-10-CM

## 2025-07-04 DIAGNOSIS — I24.89: ICD-10-CM

## 2025-07-04 DIAGNOSIS — I48.0: ICD-10-CM

## 2025-07-04 DIAGNOSIS — J81.1: ICD-10-CM

## 2025-07-04 DIAGNOSIS — K21.9: ICD-10-CM

## 2025-07-04 DIAGNOSIS — Z88.6: ICD-10-CM

## 2025-07-04 DIAGNOSIS — N28.1: ICD-10-CM

## 2025-07-04 LAB
ABG PATIENT RESP RATE: (no result) /MIN
ALBUMIN SERPL BCG-MCNC: 3 G/DL (ref 3.2–5.2)
ALP SERPL-CCNC: 103 U/L (ref 35–104)
ALT SERPL W P-5'-P-CCNC: < 9 U/L (ref 7–40)
AST SERPL-CCNC: 27 U/L (ref ?–34)
BASE EXCESS BLDA CALC-SCNC: 3.8 MMOL/L (ref -2–2)
BASE EXCESS BLDV CALC-SCNC: 2.2 MMOL/L (ref -2–2)
BASOPHILS # BLD AUTO: 0.1 10^3/UL (ref 0–0.2)
BASOPHILS NFR BLD AUTO: 0.3 % (ref 0–1)
BDY SITE: (no result)
BDY SITE: (no result)
BILIRUB CONJ SERPL-MCNC: 0.5 MG/DL (ref ?–0.4)
BILIRUB SERPL-MCNC: 1.2 MG/DL (ref 0.3–1.2)
BODY TEMPERATURE: (no result)
BODY TEMPERATURE: (no result)
BUN SERPL-MCNC: 52 MG/DL (ref 9–23)
CALCIUM SERPL-MCNC: 8.9 MG/DL (ref 8.3–10.6)
CHLORIDE SERPL-SCNC: 90 MMOL/L (ref 98–107)
CK MB CFR.DF SERPL CALC: 4.31
CK MB CFR.DF SERPL CALC: 4.46
CK MB SERPL-MCNC: 1.9 NG/ML (ref ?–3.6)
CK MB SERPL-MCNC: 2.1 NG/ML (ref ?–3.6)
CK SERPL-CCNC: 44 U/L (ref 34–145)
CK SERPL-CCNC: 47 U/L (ref 34–145)
CO2 BLDA CALC-SCNC: 32.6 MMOL/L (ref 23–31)
CO2 BLDV CALC-SCNC: 32.9 MMOL/L (ref 24–28)
CO2 SERPL-SCNC: 29 MMOL/L (ref 20–31)
CREAT SERPL-MCNC: 2.19 MG/DL (ref 0.55–1.3)
EOSINOPHIL # BLD AUTO: 0 10^3/UL (ref 0–0.5)
EOSINOPHIL NFR BLD AUTO: 0.1 % (ref 0–3)
FIO2 ON VENT: (no result) %
FIO2 ON VENT: (no result) %
FIRST RESPIRATION RATE SET: (no result) /MIN
GFR SERPL CREATININE-BSD FRML MDRD: 22.4 ML/MIN/{1.73_M2} (ref 39–?)
GLUCOSE SERPL-MCNC: 289 MG/DL (ref 74–106)
HCO3 BLDA-SCNC: 30.8 MMOL/L (ref 22–26)
HCO3 BLDV-SCNC: 30.8 MMOL/L (ref 23–27)
HCO3 STD BLDA-SCNC: 27.8 MMOL/L. (ref 22–26)
HCO3 STD BLDV-SCNC: 25.9 MMOL/L
HCT VFR BLD AUTO: 40.4 % (ref 36–47)
HGB BLD-MCNC: 12.5 G/DL (ref 12–15.5)
INHALED O2 FLOW RATE: (no result) L/MIN
INHALED O2 FLOW RATE: (no result) L/MIN
LYMPHOCYTES # BLD AUTO: 1 10^3/UL (ref 1.5–5)
LYMPHOCYTES NFR BLD AUTO: 4.8 % (ref 24–44)
MCH RBC QN AUTO: 32.1 PG (ref 27–33)
MCHC RBC AUTO-ENTMCNC: 30.9 G/DL (ref 32–36.5)
MCV RBC AUTO: 103.6 FL (ref 80–96)
MONOCYTES # BLD AUTO: 1.6 10^3/UL (ref 0–0.8)
MONOCYTES NFR BLD AUTO: 8 % (ref 2–8)
NEUTROPHILS # BLD AUTO: 17.1 10^3/UL (ref 1.5–8.5)
NEUTROPHILS NFR BLD AUTO: 84.8 % (ref 36–66)
PCO2 BLDA: 57.2 MMHG (ref 35–45)
PCO2 BLDC TCCO2: (no result) MM[HG]
PCO2 BLDV: 67.3 MMHG (ref 38–50)
PEEP RESPIRATORY: (no result) CM[H2O]
PEEP SETTING VENT: (no result) CM[H2O]
PH BLDA: 7.35 UNITS (ref 7.35–7.45)
PH BLDV: 7.28 UNITS (ref 7.33–7.43)
PLATELET # BLD AUTO: 325 10^3/UL (ref 150–450)
PO2 BLDA: 84.5 MMHG (ref 75–100)
PO2 BLDV: 47.4 MMHG (ref 30–50)
POTASSIUM SERPL-SCNC: 3.9 MMOL/L (ref 3.5–5.1)
PROCALCITONIN SERPL-MCNC: 0.79 NG/ML
PROT SERPL-MCNC: 7.5 G/DL (ref 5.7–8.2)
RBC # BLD AUTO: 3.9 10^6/UL (ref 4–5.4)
SAO2 % BLDA: 95.2 % (ref 95–99)
SAO2 % BLDV: 76.5 % (ref 60–80)
SAO2 RESTING % BLDA PULSEOX: (no result) %
SAO2 RESTING % BLDA PULSEOX: (no result) %
SODIUM SERPL-SCNC: 137 MMOL/L (ref 136–145)
TSH SERPL DL<=0.005 MIU/L-ACNC: 2.25 UIU/ML (ref 0.55–4.78)
VENTILATION MODE VENT: (no result)
VENTILATION MODE VENT: (no result)
VT RATE SETTING VENT: (no result) CC
VT RATE SETTING VENT: (no result) CC
WBC # BLD AUTO: 20.2 10^3/UL (ref 4–10)

## 2025-07-04 PROCEDURE — B246ZZZ ULTRASONOGRAPHY OF RIGHT AND LEFT HEART: ICD-10-PCS

## 2025-07-04 RX ADMIN — CEFTRIAXONE ONE MLS/HR: 2 INJECTION, POWDER, FOR SOLUTION INTRAMUSCULAR; INTRAVENOUS at 11:24

## 2025-07-04 RX ADMIN — SODIUM CHLORIDE STA MLS/HR: 9 INJECTION, SOLUTION INTRAVENOUS at 11:24

## 2025-07-04 RX ADMIN — AZITHROMYCIN MONOHYDRATE ONE MLS/HR: 500 INJECTION, POWDER, LYOPHILIZED, FOR SOLUTION INTRAVENOUS at 15:25

## 2025-07-04 RX ADMIN — METHYLPREDNISOLONE SODIUM SUCCINATE ONE MG: 125 INJECTION, POWDER, FOR SOLUTION INTRAMUSCULAR; INTRAVENOUS at 10:47

## 2025-07-04 RX ADMIN — GUAIFENESIN ONE MG: 600 TABLET, EXTENDED RELEASE ORAL at 18:03

## 2025-07-04 RX ADMIN — SULFAMETHOXAZOLE AND TRIMETHOPRIM SCH TAB: 800; 160 TABLET ORAL at 23:02

## 2025-07-05 VITALS — DIASTOLIC BLOOD PRESSURE: 73 MMHG | OXYGEN SATURATION: 91 % | TEMPERATURE: 97.3 F | SYSTOLIC BLOOD PRESSURE: 122 MMHG

## 2025-07-05 VITALS — DIASTOLIC BLOOD PRESSURE: 70 MMHG | TEMPERATURE: 97.3 F | OXYGEN SATURATION: 92 % | SYSTOLIC BLOOD PRESSURE: 116 MMHG

## 2025-07-05 VITALS — TEMPERATURE: 97.2 F | OXYGEN SATURATION: 94 % | SYSTOLIC BLOOD PRESSURE: 110 MMHG | DIASTOLIC BLOOD PRESSURE: 62 MMHG

## 2025-07-05 VITALS — SYSTOLIC BLOOD PRESSURE: 110 MMHG | OXYGEN SATURATION: 91 % | TEMPERATURE: 97 F | DIASTOLIC BLOOD PRESSURE: 61 MMHG

## 2025-07-05 VITALS — DIASTOLIC BLOOD PRESSURE: 72 MMHG | SYSTOLIC BLOOD PRESSURE: 120 MMHG | TEMPERATURE: 97 F | OXYGEN SATURATION: 95 %

## 2025-07-05 VITALS — OXYGEN SATURATION: 92 %

## 2025-07-05 VITALS — OXYGEN SATURATION: 90 %

## 2025-07-05 VITALS — OXYGEN SATURATION: 96 %

## 2025-07-05 LAB
ABG PATIENT RESP RATE: (no result) /MIN
ALBUMIN SERPL BCG-MCNC: 2.7 G/DL (ref 3.2–5.2)
ALP SERPL-CCNC: 100 U/L (ref 35–104)
ALT SERPL W P-5'-P-CCNC: < 9 U/L (ref 7–40)
AST SERPL-CCNC: 28 U/L (ref ?–34)
BASE EXCESS BLDA CALC-SCNC: 3 MMOL/L (ref -2–2)
BDY SITE: (no result)
BDY SITE: (no result)
BILIRUB SERPL-MCNC: 0.4 MG/DL (ref 0.3–1.2)
BODY TEMPERATURE: (no result)
BODY TEMPERATURE: (no result)
BUN SERPL-MCNC: 56 MG/DL (ref 9–23)
CALCIUM SERPL-MCNC: 8.8 MG/DL (ref 8.3–10.6)
CHLORIDE SERPL-SCNC: 98 MMOL/L (ref 98–107)
CO2 BLDA CALC-SCNC: 32.2 MMOL/L (ref 23–31)
CO2 SERPL-SCNC: 31 MMOL/L (ref 20–31)
CREAT SERPL-MCNC: 1.81 MG/DL (ref 0.55–1.3)
FIO2 ON VENT: (no result) %
FIO2 ON VENT: (no result) %
FIRST RESPIRATION RATE SET: (no result) /MIN
GFR SERPL CREATININE-BSD FRML MDRD: 28.1 ML/MIN/{1.73_M2} (ref 39–?)
GLUCOSE SERPL-MCNC: 142 MG/DL (ref 74–106)
HCO3 BLDA-SCNC: 30.4 MMOL/L (ref 22–26)
HCO3 STD BLDA-SCNC: 27.1 MMOL/L. (ref 22–26)
HCT VFR BLD AUTO: 37.7 % (ref 36–47)
HGB BLD-MCNC: 11.4 G/DL (ref 12–15.5)
INHALED O2 FLOW RATE: (no result) L/MIN
INHALED O2 FLOW RATE: (no result) L/MIN
KETONES UR STRIP.AUTO-MCNC: NEGATIVE MG/DL
LEUCINE CRYSTALS [PRESENCE] IN URINE BY COMPUTER ASSISTED METHOD: (no result)
LEUKOCYTE ESTERASE UR QL STRIP.AUTO: (no result)
MAGNESIUM SERPL-MCNC: 1.8 MG/DL (ref 1.8–2.4)
MCH RBC QN AUTO: 31.8 PG (ref 27–33)
MCHC RBC AUTO-ENTMCNC: 30.2 G/DL (ref 32–36.5)
MCV RBC AUTO: 105 FL (ref 80–96)
MUCOUS THREADS UR QL AUTO: (no result)
NITRITE UR QL STRIP.AUTO: NEGATIVE
PCO2 BLDA: 59.6 MMHG (ref 35–45)
PCO2 BLDC TCCO2: (no result) MM[HG]
PEEP RESPIRATORY: (no result) CM[H2O]
PEEP SETTING VENT: (no result) CM[H2O]
PH BLDA: 7.33 UNITS (ref 7.35–7.45)
PLATELET # BLD AUTO: 286 10^3/UL (ref 150–450)
PO2 BLDA: 73.4 MMHG (ref 75–100)
POTASSIUM SERPL-SCNC: 4.1 MMOL/L (ref 3.5–5.1)
PROT SERPL-MCNC: 6.9 G/DL (ref 5.7–8.2)
RBC # BLD AUTO: 3.59 10^6/UL (ref 4–5.4)
RBC # UR AUTO: 3 /HPF (ref 0–3)
SAO2 % BLDA: 94.1 % (ref 95–99)
SAO2 RESTING % BLDA PULSEOX: (no result) %
SAO2 RESTING % BLDA PULSEOX: (no result) %
SODIUM SERPL-SCNC: 143 MMOL/L (ref 136–145)
SQUAMOUS UR QL AUTO: 6 /HPF (ref 0–6)
TRANS CELLS UR QL COMP ASSIST: (no result) /HPF
TRI-PHOS CRY UR QL COMP ASSIST: (no result)
TYROSINE CRYSTALS [PRESENCE] IN URINE BY COMPUTER ASSISTED METHOD: (no result)
URATE CRY UR QL COMP ASSIST: (no result)
VENTILATION MODE VENT: (no result)
VENTILATION MODE VENT: (no result)
VT RATE SETTING VENT: (no result) CC
VT RATE SETTING VENT: (no result) CC
WAXY CASTS #/AREA UR COMP ASSIST: (no result) /LPF
WBC # BLD AUTO: 16.5 10^3/UL (ref 4–10)
WBC UR QL AUTO: 22 /HPF (ref 0–3)
YEAST UR QL AUTO: (no result)

## 2025-07-05 RX ADMIN — CEFTRIAXONE SCH MLS/HR: 2 INJECTION, POWDER, FOR SOLUTION INTRAMUSCULAR; INTRAVENOUS at 10:07

## 2025-07-05 RX ADMIN — ACETAMINOPHEN PRN MG: 325 TABLET ORAL at 11:34

## 2025-07-05 RX ADMIN — OLANZAPINE PRN MG: 10 INJECTION, POWDER, FOR SOLUTION INTRAMUSCULAR at 23:08

## 2025-07-05 RX ADMIN — GUAIFENESIN SCH MG: 600 TABLET, EXTENDED RELEASE ORAL at 10:08

## 2025-07-05 RX ADMIN — FUROSEMIDE ONE MG: 10 INJECTION, SOLUTION INTRAVENOUS at 10:58

## 2025-07-05 RX ADMIN — ACETAMINOPHEN SCH MG: 500 TABLET ORAL at 17:00

## 2025-07-05 RX ADMIN — AZITHROMYCIN MONOHYDRATE SCH MLS/HR: 500 INJECTION, POWDER, LYOPHILIZED, FOR SOLUTION INTRAVENOUS at 15:25

## 2025-07-05 RX ADMIN — LIDOCAINE SCH PATCH: 50 PATCH CUTANEOUS at 17:01

## 2025-07-05 RX ADMIN — PANTOPRAZOLE SODIUM SCH MG: 40 INJECTION, POWDER, LYOPHILIZED, FOR SOLUTION INTRAVENOUS at 10:07

## 2025-07-06 VITALS — DIASTOLIC BLOOD PRESSURE: 73 MMHG | SYSTOLIC BLOOD PRESSURE: 117 MMHG

## 2025-07-06 VITALS — TEMPERATURE: 97 F | OXYGEN SATURATION: 92 % | SYSTOLIC BLOOD PRESSURE: 122 MMHG | DIASTOLIC BLOOD PRESSURE: 72 MMHG

## 2025-07-06 VITALS — OXYGEN SATURATION: 89 %

## 2025-07-06 VITALS — OXYGEN SATURATION: 90 % | TEMPERATURE: 97.2 F | SYSTOLIC BLOOD PRESSURE: 135 MMHG | DIASTOLIC BLOOD PRESSURE: 94 MMHG

## 2025-07-06 VITALS — OXYGEN SATURATION: 90 %

## 2025-07-06 VITALS — OXYGEN SATURATION: 91 % | TEMPERATURE: 96 F | DIASTOLIC BLOOD PRESSURE: 75 MMHG | SYSTOLIC BLOOD PRESSURE: 118 MMHG

## 2025-07-06 VITALS — TEMPERATURE: 97.4 F | OXYGEN SATURATION: 94 % | DIASTOLIC BLOOD PRESSURE: 83 MMHG | SYSTOLIC BLOOD PRESSURE: 114 MMHG

## 2025-07-06 VITALS — OXYGEN SATURATION: 94 % | DIASTOLIC BLOOD PRESSURE: 68 MMHG | TEMPERATURE: 97.2 F | SYSTOLIC BLOOD PRESSURE: 113 MMHG

## 2025-07-06 VITALS — SYSTOLIC BLOOD PRESSURE: 140 MMHG | OXYGEN SATURATION: 91 % | TEMPERATURE: 96.1 F | DIASTOLIC BLOOD PRESSURE: 86 MMHG

## 2025-07-06 VITALS — SYSTOLIC BLOOD PRESSURE: 128 MMHG | DIASTOLIC BLOOD PRESSURE: 87 MMHG

## 2025-07-06 VITALS — OXYGEN SATURATION: 91 %

## 2025-07-06 VITALS — DIASTOLIC BLOOD PRESSURE: 75 MMHG | TEMPERATURE: 97.2 F | OXYGEN SATURATION: 93 % | SYSTOLIC BLOOD PRESSURE: 120 MMHG

## 2025-07-06 VITALS — OXYGEN SATURATION: 94 %

## 2025-07-06 LAB
ANISOCYTOSIS BLD QL SMEAR: (no result)
AUER BODIES BLD QL SMEAR: (no result)
BASE EXCESS BLDV CALC-SCNC: 3.3 MMOL/L (ref -2–2)
BASO STIPL BLD QL SMEAR: (no result)
BASOPHILS # BLD AUTO: (no result) 10^3/UL (ref 0–0.2)
BASOPHILS NFR BLD AUTO: (no result) % (ref 0–1)
BASOPHILS NFR BLD MANUAL: (no result) % (ref 0–1)
BLASTS NFR BLD MANUAL: (no result) % (ref 0–0)
BUN SERPL-MCNC: 66 MG/DL (ref 9–23)
BURR CELLS BLD QL SMEAR: (no result)
BURR CELLS BLD QL SMEAR: (no result)
CALCIUM SERPL-MCNC: 9.3 MG/DL (ref 8.3–10.6)
CHLORIDE SERPL-SCNC: 99 MMOL/L (ref 98–107)
CO2 BLDV CALC-SCNC: 32.5 MMOL/L (ref 24–28)
CO2 SERPL-SCNC: 31 MMOL/L (ref 20–31)
CREAT SERPL-MCNC: 2.17 MG/DL (ref 0.55–1.3)
DACRYOCYTES BLD QL SMEAR: (no result)
DOHLE BOD BLD QL SMEAR: (no result)
EOSINOPHIL # BLD AUTO: (no result) 10^3/UL (ref 0–0.5)
EOSINOPHIL NFR BLD AUTO: (no result) % (ref 0–3)
EOSINOPHIL NFR BLD MANUAL: (no result) % (ref 0–3)
GFR SERPL CREATININE-BSD FRML MDRD: 22.6 ML/MIN/{1.73_M2} (ref 39–?)
GIANT PLATELETS BLD QL SMEAR: (no result)
GLUCOSE SERPL-MCNC: 176 MG/DL (ref 74–106)
HCO3 BLDV-SCNC: 30.7 MMOL/L (ref 23–27)
HCO3 STD BLDV-SCNC: 27.4 MMOL/L
HCT VFR BLD AUTO: 38.6 % (ref 36–47)
HELMET CELLS BLD QL SMEAR: (no result)
HGB BLD-MCNC: 11.5 G/DL (ref 12–15.5)
HGB S BLD QL SOLY: (no result)
HOWELL-JOLLY BOD BLD QL SMEAR: (no result)
HYPOCHROMIA BLD QL SMEAR: (no result)
LYMPHOCYTES # BLD AUTO: (no result) 10^3/UL (ref 1.5–5)
LYMPHOCYTES NFR BLD AUTO: (no result) % (ref 24–44)
LYMPHOCYTES NFR BLD MANUAL: 5 % (ref 16–44)
MACROCYTES BLD QL SMEAR: (no result)
MCH RBC QN AUTO: 31.3 PG (ref 27–33)
MCHC RBC AUTO-ENTMCNC: 29.8 G/DL (ref 32–36.5)
MCV RBC AUTO: 105.2 FL (ref 80–96)
METAMYELOCYTES NFR BLD MANUAL: (no result) % (ref 0–0)
MICROCYTES BLD QL SMEAR: (no result)
MONOCYTES # BLD AUTO: (no result) 10^3/UL (ref 0–0.8)
MONOCYTES NFR BLD AUTO: (no result) % (ref 2–8)
MONOCYTES NFR BLD MANUAL: 10 % (ref 0–5)
MYELOBLASTS NFR BLD MANUAL: (no result) % (ref 0–0)
NEUTROPHILS # BLD AUTO: (no result) 10^3/UL (ref 1.5–8.5)
NEUTROPHILS NFR BLD AUTO: (no result) % (ref 36–66)
NEUTROPHILS NFR BLD MANUAL: 83 % (ref 28–66)
NEUTS HYPERSEG BLD QL SMEAR: (no result)
NRBC BLD MANUAL-RTO: (no result) % (ref 0–0)
OTHER CELLS NFR BLD MANUAL: (no result) % (ref 0–0)
OVALOCYTES BLD QL SMEAR: (no result)
PCO2 BLDV: 60.1 MMHG (ref 38–50)
PH BLDV: 7.33 UNITS (ref 7.33–7.43)
PLASMA CELLS BLD QL SMEAR: (no result) % (ref 0–0)
PLATELET # BLD AUTO: 343 10^3/UL (ref 150–450)
PLATELET BLD QL SMEAR: NORMAL
PLATELET CLUMP BLD QL SMEAR: (no result)
PO2 BLDV: 123.4 MMHG (ref 30–50)
POIKILOCYTOSIS BLD QL SMEAR: (no result)
POLYCHROMASIA BLD QL SMEAR: (no result)
POTASSIUM SERPL-SCNC: 4.1 MMOL/L (ref 3.5–5.1)
PROMYELOCYTES # BLD MANUAL: (no result) % (ref 0–0)
RBC # BLD AUTO: 3.67 10^6/UL (ref 4–5.4)
RBC MORPH BLD: (no result)
ROULEAUX BLD QL SMEAR: (no result)
SAO2 % BLDV: 98.4 % (ref 60–80)
SCHISTOCYTES BLD QL SMEAR: (no result)
SMUDGE CELLS BLD QL SMEAR: (no result)
SODIUM SERPL-SCNC: 142 MMOL/L (ref 136–145)
SPHEROCYTES BLD QL SMEAR: (no result)
STOMATOCYTES BLD QL SMEAR: (no result)
TARGETS BLD QL SMEAR: (no result)
TOXIC GRANULES BLD QL SMEAR: (no result)
VARIANT LYMPHS NFR BLD MANUAL: 2 % (ref 0–5)
WBC # BLD AUTO: 15.5 10^3/UL (ref 4–10)
WBC TOXIC VACUOLES BLD QL SMEAR: (no result)

## 2025-07-06 RX ADMIN — METOPROLOL TARTRATE SCH MG: 5 INJECTION INTRAVENOUS at 05:36

## 2025-07-06 RX ADMIN — APIXABAN SCH MG: 2.5 TABLET, FILM COATED ORAL at 21:38

## 2025-07-06 RX ADMIN — METOPROLOL TARTRATE ONE MG: 25 TABLET, FILM COATED ORAL at 10:14

## 2025-07-06 RX ADMIN — METOPROLOL TARTRATE ONE MG: 5 INJECTION INTRAVENOUS at 14:18

## 2025-07-06 RX ADMIN — DIGOXIN ONE MG: 0.25 INJECTION INTRAMUSCULAR; INTRAVENOUS at 11:01

## 2025-07-06 RX ADMIN — METOPROLOL TARTRATE SCH MG: 5 INJECTION INTRAVENOUS at 02:15

## 2025-07-06 RX ADMIN — METOPROLOL TARTRATE SCH MG: 25 TABLET, FILM COATED ORAL at 21:37

## 2025-07-07 VITALS — OXYGEN SATURATION: 93 %

## 2025-07-07 VITALS — DIASTOLIC BLOOD PRESSURE: 56 MMHG | OXYGEN SATURATION: 96 % | SYSTOLIC BLOOD PRESSURE: 120 MMHG | TEMPERATURE: 98.5 F

## 2025-07-07 VITALS — DIASTOLIC BLOOD PRESSURE: 87 MMHG | SYSTOLIC BLOOD PRESSURE: 126 MMHG | TEMPERATURE: 96.8 F | OXYGEN SATURATION: 91 %

## 2025-07-07 VITALS — DIASTOLIC BLOOD PRESSURE: 68 MMHG | OXYGEN SATURATION: 97 % | SYSTOLIC BLOOD PRESSURE: 121 MMHG

## 2025-07-07 VITALS — TEMPERATURE: 98.6 F | DIASTOLIC BLOOD PRESSURE: 64 MMHG | OXYGEN SATURATION: 94 % | SYSTOLIC BLOOD PRESSURE: 120 MMHG

## 2025-07-07 VITALS — SYSTOLIC BLOOD PRESSURE: 148 MMHG | TEMPERATURE: 97.4 F | OXYGEN SATURATION: 96 % | DIASTOLIC BLOOD PRESSURE: 65 MMHG

## 2025-07-07 VITALS — OXYGEN SATURATION: 92 %

## 2025-07-07 VITALS — OXYGEN SATURATION: 95 % | DIASTOLIC BLOOD PRESSURE: 78 MMHG | SYSTOLIC BLOOD PRESSURE: 125 MMHG | TEMPERATURE: 97.2 F

## 2025-07-07 VITALS — OXYGEN SATURATION: 91 %

## 2025-07-07 VITALS — OXYGEN SATURATION: 92 % | DIASTOLIC BLOOD PRESSURE: 71 MMHG | SYSTOLIC BLOOD PRESSURE: 117 MMHG | TEMPERATURE: 96.5 F

## 2025-07-07 VITALS — OXYGEN SATURATION: 84 %

## 2025-07-07 VITALS — OXYGEN SATURATION: 94 % | SYSTOLIC BLOOD PRESSURE: 118 MMHG | DIASTOLIC BLOOD PRESSURE: 62 MMHG

## 2025-07-07 VITALS — OXYGEN SATURATION: 97 %

## 2025-07-07 VITALS — OXYGEN SATURATION: 96 %

## 2025-07-07 VITALS — OXYGEN SATURATION: 94 %

## 2025-07-07 LAB
ABG PATIENT RESP RATE: (no result) /MIN
BASE EXCESS BLDA CALC-SCNC: 4.5 MMOL/L (ref -2–2)
BASE EXCESS BLDA CALC-SCNC: 5.3 MMOL/L (ref -2–2)
BASOPHILS # BLD AUTO: 0 10^3/UL (ref 0–0.2)
BASOPHILS NFR BLD AUTO: 0.1 % (ref 0–1)
BDY SITE: (no result)
BDY SITE: (no result)
BODY TEMPERATURE: (no result)
BUN SERPL-MCNC: 78 MG/DL (ref 9–23)
CALCIUM SERPL-MCNC: 9.3 MG/DL (ref 8.3–10.6)
CHLORIDE SERPL-SCNC: 100 MMOL/L (ref 98–107)
CO2 BLDA CALC-SCNC: 35.8 MMOL/L (ref 23–31)
CO2 BLDA CALC-SCNC: 37.1 MMOL/L (ref 23–31)
CO2 SERPL-SCNC: 32 MMOL/L (ref 20–31)
CREAT SERPL-MCNC: 2.2 MG/DL (ref 0.55–1.3)
EOSINOPHIL # BLD AUTO: 0 10^3/UL (ref 0–0.5)
EOSINOPHIL NFR BLD AUTO: 0 % (ref 0–3)
FIO2 ON VENT: (no result) %
GFR SERPL CREATININE-BSD FRML MDRD: 22.3 ML/MIN/{1.73_M2} (ref 39–?)
GLUCOSE SERPL-MCNC: 123 MG/DL (ref 74–106)
HCO3 BLDA-SCNC: 33.7 MMOL/L (ref 22–26)
HCO3 BLDA-SCNC: 34.5 MMOL/L (ref 22–26)
HCO3 STD BLDA-SCNC: 28.5 MMOL/L. (ref 22–26)
HCO3 STD BLDA-SCNC: 29.2 MMOL/L. (ref 22–26)
HCT VFR BLD AUTO: 39.7 % (ref 36–47)
HGB BLD-MCNC: 12.1 G/DL (ref 12–15.5)
INHALED O2 FLOW RATE: (no result) L/MIN
LYMPHOCYTES # BLD AUTO: 0.6 10^3/UL (ref 1.5–5)
LYMPHOCYTES NFR BLD AUTO: 4.2 % (ref 24–44)
MCH RBC QN AUTO: 32 PG (ref 27–33)
MCHC RBC AUTO-ENTMCNC: 30.5 G/DL (ref 32–36.5)
MCV RBC AUTO: 105 FL (ref 80–96)
MONOCYTES # BLD AUTO: 0.7 10^3/UL (ref 0–0.8)
MONOCYTES NFR BLD AUTO: 4.3 % (ref 2–8)
NEUTROPHILS # BLD AUTO: 12.5 10^3/UL (ref 1.5–8.5)
NEUTROPHILS NFR BLD AUTO: 81.3 % (ref 36–66)
PCO2 BLDA: 68.7 MMHG (ref 35–45)
PCO2 BLDA: 83.6 MMHG (ref 35–45)
PCO2 BLDC TCCO2: (no result) MM[HG]
PEEP SETTING VENT: (no result) CM[H2O]
PH BLDA: 7.23 UNITS (ref 7.35–7.45)
PH BLDA: 7.31 UNITS (ref 7.35–7.45)
PLATELET # BLD AUTO: 341 10^3/UL (ref 150–450)
PO2 BLDA: 232.6 MMHG (ref 75–100)
PO2 BLDA: 81 MMHG (ref 75–100)
POTASSIUM SERPL-SCNC: 5 MMOL/L (ref 3.5–5.1)
RBC # BLD AUTO: 3.78 10^6/UL (ref 4–5.4)
SAO2 % BLDA: 95 % (ref 95–99)
SAO2 % BLDA: 99.5 % (ref 95–99)
SAO2 RESTING % BLDA PULSEOX: (no result) %
SODIUM SERPL-SCNC: 142 MMOL/L (ref 136–145)
VENTILATION MODE VENT: (no result)
VT RATE SETTING VENT: (no result) CC
WBC # BLD AUTO: 15.4 10^3/UL (ref 4–10)

## 2025-07-07 RX ADMIN — AZITHROMYCIN MONOHYDRATE SCH MLS/HR: 500 INJECTION, POWDER, LYOPHILIZED, FOR SOLUTION INTRAVENOUS at 21:13

## 2025-07-07 RX ADMIN — AZITHROMYCIN SCH MG: 250 TABLET, FILM COATED ORAL at 15:00

## 2025-07-07 RX ADMIN — SODIUM CHLORIDE SOLN NEBU 3% SCH ML: 3 NEBU SOLN at 08:00

## 2025-07-08 VITALS — OXYGEN SATURATION: 96 %

## 2025-07-08 VITALS — DIASTOLIC BLOOD PRESSURE: 67 MMHG | SYSTOLIC BLOOD PRESSURE: 140 MMHG | TEMPERATURE: 97.2 F | OXYGEN SATURATION: 95 %

## 2025-07-08 VITALS — OXYGEN SATURATION: 97 % | SYSTOLIC BLOOD PRESSURE: 117 MMHG | DIASTOLIC BLOOD PRESSURE: 71 MMHG

## 2025-07-08 VITALS — DIASTOLIC BLOOD PRESSURE: 80 MMHG | SYSTOLIC BLOOD PRESSURE: 135 MMHG | OXYGEN SATURATION: 97 %

## 2025-07-08 VITALS — OXYGEN SATURATION: 93 % | DIASTOLIC BLOOD PRESSURE: 58 MMHG | SYSTOLIC BLOOD PRESSURE: 101 MMHG

## 2025-07-08 VITALS — DIASTOLIC BLOOD PRESSURE: 79 MMHG | SYSTOLIC BLOOD PRESSURE: 129 MMHG | OXYGEN SATURATION: 93 %

## 2025-07-08 VITALS — SYSTOLIC BLOOD PRESSURE: 117 MMHG | DIASTOLIC BLOOD PRESSURE: 65 MMHG | TEMPERATURE: 98 F | OXYGEN SATURATION: 95 %

## 2025-07-08 VITALS — OXYGEN SATURATION: 97 % | SYSTOLIC BLOOD PRESSURE: 108 MMHG | DIASTOLIC BLOOD PRESSURE: 71 MMHG

## 2025-07-08 VITALS — OXYGEN SATURATION: 94 % | TEMPERATURE: 97.8 F | DIASTOLIC BLOOD PRESSURE: 57 MMHG | SYSTOLIC BLOOD PRESSURE: 122 MMHG

## 2025-07-08 VITALS — OXYGEN SATURATION: 93 %

## 2025-07-08 VITALS — SYSTOLIC BLOOD PRESSURE: 124 MMHG | DIASTOLIC BLOOD PRESSURE: 84 MMHG | OXYGEN SATURATION: 95 %

## 2025-07-08 VITALS — DIASTOLIC BLOOD PRESSURE: 97 MMHG | SYSTOLIC BLOOD PRESSURE: 139 MMHG | OXYGEN SATURATION: 95 %

## 2025-07-08 VITALS — SYSTOLIC BLOOD PRESSURE: 127 MMHG | OXYGEN SATURATION: 96 % | DIASTOLIC BLOOD PRESSURE: 85 MMHG

## 2025-07-08 VITALS — SYSTOLIC BLOOD PRESSURE: 139 MMHG | DIASTOLIC BLOOD PRESSURE: 63 MMHG | OXYGEN SATURATION: 98 % | TEMPERATURE: 98.3 F

## 2025-07-08 VITALS — OXYGEN SATURATION: 97 %

## 2025-07-08 VITALS — OXYGEN SATURATION: 92 %

## 2025-07-08 VITALS — DIASTOLIC BLOOD PRESSURE: 84 MMHG | SYSTOLIC BLOOD PRESSURE: 124 MMHG

## 2025-07-08 VITALS — SYSTOLIC BLOOD PRESSURE: 126 MMHG | DIASTOLIC BLOOD PRESSURE: 74 MMHG | OXYGEN SATURATION: 95 %

## 2025-07-08 VITALS — OXYGEN SATURATION: 92 % | SYSTOLIC BLOOD PRESSURE: 118 MMHG | DIASTOLIC BLOOD PRESSURE: 71 MMHG

## 2025-07-08 VITALS — OXYGEN SATURATION: 98 %

## 2025-07-08 VITALS — OXYGEN SATURATION: 95 %

## 2025-07-08 LAB
1,3 BETA GLUCAN SER QL: NEGATIVE
1,3 BETA GLUCAN SER-MCNC: < 31 PG/ML (ref ?–60)
BASE EXCESS BLDA CALC-SCNC: 5.8 MMOL/L (ref -2–2)
BASOPHILS # BLD AUTO: 0 10^3/UL (ref 0–0.2)
BASOPHILS NFR BLD AUTO: 0.1 % (ref 0–1)
BDY SITE: (no result)
BUN SERPL-MCNC: 81 MG/DL (ref 9–23)
CALCIUM SERPL-MCNC: 9.3 MG/DL (ref 8.3–10.6)
CHLORIDE SERPL-SCNC: 103 MMOL/L (ref 98–107)
CO2 BLDA CALC-SCNC: 34.3 MMOL/L (ref 23–31)
CO2 SERPL-SCNC: 37 MMOL/L (ref 20–31)
CREAT SERPL-MCNC: 1.93 MG/DL (ref 0.55–1.3)
EOSINOPHIL # BLD AUTO: 0 10^3/UL (ref 0–0.5)
EOSINOPHIL NFR BLD AUTO: 0 % (ref 0–3)
GFR SERPL CREATININE-BSD FRML MDRD: 26 ML/MIN/{1.73_M2} (ref 39–?)
GLUCOSE SERPL-MCNC: 129 MG/DL (ref 74–106)
HCO3 BLDA-SCNC: 32.5 MMOL/L (ref 22–26)
HCO3 STD BLDA-SCNC: 29.7 MMOL/L. (ref 22–26)
HCT VFR BLD AUTO: 35.9 % (ref 36–47)
HGB BLD-MCNC: 10.9 G/DL (ref 12–15.5)
LYMPHOCYTES # BLD AUTO: 0.5 10^3/UL (ref 1.5–5)
LYMPHOCYTES NFR BLD AUTO: 3.4 % (ref 24–44)
MCH RBC QN AUTO: 32.1 PG (ref 27–33)
MCHC RBC AUTO-ENTMCNC: 30.4 G/DL (ref 32–36.5)
MCV RBC AUTO: 105.6 FL (ref 80–96)
MONOCYTES # BLD AUTO: 0.7 10^3/UL (ref 0–0.8)
MONOCYTES NFR BLD AUTO: 4.5 % (ref 2–8)
NEUTROPHILS # BLD AUTO: 12.7 10^3/UL (ref 1.5–8.5)
NEUTROPHILS NFR BLD AUTO: 81.5 % (ref 36–66)
PCO2 BLDA: 57.5 MMHG (ref 35–45)
PH BLDA: 7.37 UNITS (ref 7.35–7.45)
PLATELET # BLD AUTO: 326 10^3/UL (ref 150–450)
PO2 BLDA: 108.5 MMHG (ref 75–100)
POTASSIUM SERPL-SCNC: 5.3 MMOL/L (ref 3.5–5.1)
RBC # BLD AUTO: 3.4 10^6/UL (ref 4–5.4)
SAO2 % BLDA: 98.2 % (ref 95–99)
SODIUM SERPL-SCNC: 147 MMOL/L (ref 136–145)
WBC # BLD AUTO: 15.5 10^3/UL (ref 4–10)

## 2025-07-08 RX ADMIN — LORAZEPAM PRN MG: 1 TABLET ORAL at 15:54

## 2025-07-08 RX ADMIN — FUROSEMIDE SCH MLS/HR: 10 INJECTION, SOLUTION INTRAVENOUS at 10:45

## 2025-07-08 RX ADMIN — CEFTRIAXONE SCH MLS/HR: 1 INJECTION, POWDER, FOR SOLUTION INTRAMUSCULAR; INTRAVENOUS at 10:46

## 2025-07-08 RX ADMIN — LORAZEPAM SCH MG: 1 TABLET ORAL at 21:41

## 2025-07-09 RX ADMIN — ONDANSETRON SCH MG: 4 TABLET, ORALLY DISINTEGRATING ORAL at 12:13

## 2025-07-09 RX ADMIN — PANTOPRAZOLE SODIUM SCH MG: 40 TABLET, DELAYED RELEASE ORAL at 11:18

## 2025-07-09 RX ADMIN — PREDNISONE SCH MG: 20 TABLET ORAL at 11:14
